# Patient Record
Sex: FEMALE | Race: WHITE
[De-identification: names, ages, dates, MRNs, and addresses within clinical notes are randomized per-mention and may not be internally consistent; named-entity substitution may affect disease eponyms.]

---

## 2017-06-05 NOTE — P.OP
Date of Procedure: 06/05/17


Preoperative Diagnosis: 


Cholecystitis


Postoperative Diagnosis: 


Cholecystitis





Cholelithiasis


Procedure(s) Performed: 


Laparoscopic cholecystectomy


Implants: 





Anesthesia: ZIGGY


Surgeon: Jose Santos


Estimated Blood Loss (ml): 5


Pathology: other (Gallbladder)


Condition: stable


Disposition: PACU


Indications for Procedure: 





Operative Findings: 





Description of Procedure: 


The patient was placed on the operating table.   The patient received a general 

endotracheal tube anesthesia.    The patients abdomen was prepped and draped 

in the usual sterile fashion.    Through an infraumbilical stab incision, the 

fascia of the anterior abdominal wall was grasped with a pair of Kochers and 

then the Veress needle was placed in the peritoneal cavity.   Position of the 

Veress needle was confirmed with positive drop test.   The abdomen was then 

insufflated.  After adequate insufflation, the 10 mm trocar was placed in the 

peritoneal cavity.    Following this the laparoscope was placed in the 

peritoneal cavity. The patient was placed in the head-up, right side up 

position and then a 5 mm trocar was placed in the right lateral and right 

subcostal position under direct visualization.    A 8 mm trocar was placed in 

the epigastric position.  The gallbladder was grasped in the fundus and 

infundibulum.  Traction  on the gallbladder was placed in the lateral and the 

cephalad positions.  The triangle of Calot  was visualized..   The cystic duct 

was bluntly dissected until the union of the cystic duct and common bile duct 

was seen.    The cystic duct was then divided and sealed with the Harmonic 

scissors.  A PDS Endoloop was then placed throughout the cystic duct stump.  

The cystic artery divided and sealed with the Harmonic scissors.   The 

gallbladder was then removed from the liver bed using Harmonic scissors.   The 

gallbladder was then extracted through the epigastric port site.  Operative 

field was checked for any bleeding spots and Harmonic scissors was used to 

coagulate the liver bed.    The abdomen was irrigated.   The trocars were 

removed.  The skin was closed using interrupted 3-0 Vicryl suture.   Dermabond 

dressing were applied.   The patient tolerated the procedure well.

## 2017-06-05 NOTE — P.GSHP
History of Present Illness


H&P Date: 17


Chief Complaint: Right upper quadrant pain





This is a 31-year-old female who presents today for laparoscopic 

cholecystectomy.  Patient presents with chronic complaints of right quadrant 

pain.  Her recent HIDA scan shows abnormal ejection fraction





- Constitutional


Constitutional: Reports as per HPI





Past Medical History


Past Medical History: No Reported History


History of Any Multi-Drug Resistant Organisms: None Reported


Past Surgical History:  Section


Past Anesthesia/Blood Transfusion Reactions: No Reported Reaction


Past Psychological History: Bipolar


Smoking Status: Never smoker


Past Alcohol Use History: None Reported


Past Drug Use History: None Reported





- Past Family History


  ** Mother


Family Medical History: No Reported History


Additional Family Medical History / Comment(s): mother / car accident





  ** Father


Family Medical History: No Reported History


Additional Family Medical History / Comment(s): / car accident





Medications and Allergies


 Home Medications











 Medication  Instructions  Recorded  Confirmed  Type


 


Dasetta 1 tab PO DAILY 17 History


 


Sertraline [Zoloft] 50 mg PO DAILY 17 History


 


lamoTRIgine [LaMICtal] 100 mg PO BID 17 History











 Allergies











Allergy/AdvReac Type Severity Reaction Status Date / Time


 


No Known Allergies Allergy   Verified 17 08:05














Surgical - Exam


 Vital Signs











Temp Pulse Resp BP Pulse Ox


 


 97.1 F L  79   16   122/60   98 


 


 17 08:16  17 08:16  17 08:16  17 08:16  17 08:16














- General


well developed





- Eyes


PERRL





- ENT


normal pinna





- Neck


no masses





- Respiratory


normal expansion





- Cardiovascular


Rhythm: regular





- Abdomen


Abdomen: soft, non tender





Assessment and Plan


Plan: 





Chronic cholecystitis.  We'll perform laparoscopic cholecystectomy.

## 2017-07-07 NOTE — P.OP
Date of Procedure: 07/07/17


Preoperative Diagnosis: 


Abdominal pain





Constipation


Postoperative Diagnosis: 


Normal colon


Procedure(s) Performed: 


Colonoscopy


Implants: 





Anesthesia: MAC


Surgeon: Jose Santos


Pathology: none sent


Condition: stable ([place])


Indications for Procedure: 





Operative Findings: 





Description of Procedure: 





PROCEDURE:  The patient was placed on the endoscopy table in the lateral 

position.  Digital rectal examination was performed which revealed no 

abnormalities.  .  Flexible colonoscope was then placed in the patient's anus 

and passed throughout the entire colon.  The ileocecal valve was visualized.  

The cecum, ascending, transverse, descending and sigmoid colon were normal.  

The rectum was normal as well.  There were no masses, polyps or diverticula 

noted in the entire colon. 





SUMMARY OF FINDINGS:  


Normal colonoscopy.

## 2017-07-07 NOTE — P.GSHP
History of Present Illness


H&P Date: 17


Chief Complaint: Constipation, abdominal pain





Asst. 31-year-old female for from Dr. gomez.  Patient was a today for 

colonoscopy.  She's had issues with constipation and lower quadrant abdominal 

pain.





Past Medical History


Past Medical History: No Reported History


History of Any Multi-Drug Resistant Organisms: None Reported


Past Surgical History:  Section, Cholecystectomy


Past Anesthesia/Blood Transfusion Reactions: No Reported Reaction


Past Psychological History: Bipolar


Smoking Status: Never smoker





- Past Family History


  ** Mother


Family Medical History: No Reported History


Additional Family Medical History / Comment(s): mother / car accident





  ** Father


Family Medical History: No Reported History


Additional Family Medical History / Comment(s): / car accident





Medications and Allergies


 Home Medications











 Medication  Instructions  Recorded  Confirmed  Type


 


Sertraline [Zoloft] 50 mg PO DAILY 17 History


 


lamoTRIgine [LaMICtal] 100 mg PO BID 17 History











 Allergies











Allergy/AdvReac Type Severity Reaction Status Date / Time


 


No Known Allergies Allergy   Verified 17 11:53














Surgical - Exam


 Vital Signs











Temp Pulse Resp BP Pulse Ox


 


 98 F   73   18   117/73   100 


 


 17 11:32  17 11:32  17 11:32  17 11:32  17 11:32














- General


well developed, no distress





- Eyes


PERRL





- ENT


normal pinna





- Neck


no masses





- Respiratory


normal expansion





- Cardiovascular


Rhythm: regular





- Abdomen


Abdomen: soft, non tender





Assessment and Plan


Plan: 





Constipation, change in bowel habits, abdominal pain.  We'll perform 

colonoscopy.

## 2017-08-12 NOTE — ED
General Adult HPI





- General


Chief complaint: Abdominal Pain


Stated complaint: stomach pain


Time Seen by Provider: 17 11:50


Source: patient, RN notes reviewed


Mode of arrival: ambulatory


Limitations: no limitations





- History of Present Illness


Initial comments: 





Patient 32-year-old female who presents emergency room today with chief 

complaint of abdominal pain located on the right side of the lower abdomen.  

She states the pain has been going on for "sometime".  She states she has seen 

multiple doctors for this in the past.  Most recently she was at [facility] just a few weeks ago had a CAT scan obtained.  She states she was told 

everything looked well.  She states still experiencing the pain that comes and 

goes.  Describes it as a sharp type pain at times.  States started once again 

last night.  Denies any other associated symptoms with it.  Denies any vaginal 

bleeding discharge.  Denies any other complaints. Patient denies any recent 

fever, chills, shortness of breath, chest pain, back pain, nausea or vomiting, 

numbness or tingling, dysuria or hematuria, constipation or diarrhea, headaches 

or visual changes, or any other complaints.





- Related Data


 Home Medications











 Medication  Instructions  Recorded  Confirmed


 


carBAMazepine [TEGretol] 200 mg PO Q12H 17











 Allergies











Allergy/AdvReac Type Severity Reaction Status Date / Time


 


No Known Allergies Allergy   Verified 17 12:19














Review of Systems


ROS Statement: 


Those systems with pertinent positive or pertinent negative responses have been 

documented in the HPI.





ROS Other: All systems not noted in ROS Statement are negative.





Past Medical History


Past Medical History: No Reported History


History of Any Multi-Drug Resistant Organisms: None Reported


Past Surgical History:  Section, Cholecystectomy


Past Anesthesia/Blood Transfusion Reactions: No Reported Reaction


Past Psychological History: Bipolar


Smoking Status: Never smoker


Past Alcohol Use History: None Reported


Past Drug Use History: None Reported





- Past Family History


  ** Mother


Family Medical History: No Reported History


Additional Family Medical History / Comment(s): mother / car accident





  ** Father


Family Medical History: No Reported History


Additional Family Medical History / Comment(s): / car accident





General Exam





- General Exam Comments


Initial Comments: 





General:  The patient is awake and alert, in no distress, and does not appear 

acutely ill. 


Eye:  Pupils are equal, round and reactive to light, extra-ocular movements are 

intact.  No nystagmus.  There is normal conjunctiva bilaterally.  No signs of 

icterus.  


Ears, nose, mouth and throat:  There are moist mucous membranes and no oral 

lesions. 


Neck:  The neck is supple, there is no tenderness or JVD.  


Cardiovascular:  There is a regular rate and rhythm. No murmur, rub or gallop 

is appreciated.


Respiratory:  Lungs are clear to auscultation, respirations are non-labored, 

breath sounds are equal.  No wheezes, stridor, rales, or rhonchi.


Gastrointestinal:  Normal appearance abdomen.  Normal bowel sounds.  Abdomen 

soft on palpation.  Patient does have tenderness right lower quadrant.  No 

rebound tenderness.  No guarding.  No CVA tenderness.


Musculoskeletal:  Normal ROM, no tenderness.  Strength 5/5. Sensation intact. 

Pulses equal bilaterally 2+.  


Neurological:  A&O x 3. CN II-XII intact, There are no obvious motor or sensory 

deficits. Coordination appears grossly intact. Speech is normal.


Skin:  Skin is warm and dry and no rashes or lesions are noted. 


Psychiatric:  Cooperative, appropriate mood & affect, normal judgment.  


Limitations: no limitations


External exam: Present: normal external exam (ER JOANA Zimmerman present for exam.)


Speculum exam: Present: normal speculum exam.  Absent: cervical discharge, 

vaginal bleeding, foreign body, tissue, laceration


By manual exam: Present: normal by manual exam.  Absent: cervical motion 

tenderness, adnexal tenderness, adnexal mass





Course





 Vital Signs











  17





  11:43 13:57


 


Temperature 97.1 F L 


 


Pulse Rate 89 83


 


Respiratory 18 18





Rate  


 


Blood Pressure 126/74 113/76


 


O2 Sat by Pulse 100 99





Oximetry  














Medical Decision Making





- Medical Decision Making





Patient's CAT scan from [facility] has been reviewed is performed 

on 2017 negative for any acute abnormalities.  A small bottle abdominal 

series is also performed on 2017 which was unremarkable.  At this time 

her abdomen is soft nontender.  Ultrasound does show possible follicle left 

ovary.  No sign of torsion.  Patient's labs reviewed unremarkable.  At time 

patient resting comfortably in the stretcher.  Cultures are pending in the lab.

  Patient will be discharged home advised follow-up with OB/GYN along with 

family physician.  Advised return for any other concerns.





- Lab Data


Result diagrams: 


 17 12:25





 17 12:25





 Lab Results











  17 Range/Units





  12:04 12:04 12:25 


 


WBC     (3.8-10.6)  k/uL


 


RBC     (3.80-5.40)  m/uL


 


Hgb     (11.4-16.0)  gm/dL


 


Hct     (34.0-46.0)  %


 


MCV     (80.0-100.0)  fL


 


MCH     (25.0-35.0)  pg


 


MCHC     (31.0-37.0)  g/dL


 


RDW     (11.5-15.5)  %


 


Plt Count     (150-450)  k/uL


 


Neutrophils %     %


 


Lymphocytes %     %


 


Monocytes %     %


 


Eosinophils %     %


 


Basophils %     %


 


Neutrophils #     (1.3-7.7)  k/uL


 


Lymphocytes #     (1.0-4.8)  k/uL


 


Monocytes #     (0-1.0)  k/uL


 


Eosinophils #     (0-0.7)  k/uL


 


Basophils #     (0-0.2)  k/uL


 


Sodium    142  (137-145)  mmol/L


 


Potassium    4.1  (3.5-5.1)  mmol/L


 


Chloride    105  ()  mmol/L


 


Carbon Dioxide    28  (22-30)  mmol/L


 


Anion Gap    9  mmol/L


 


BUN    11  (7-17)  mg/dL


 


Creatinine    0.70  (0.52-1.04)  mg/dL


 


Est GFR (MDRD) Af Amer    >60  (>60 ml/min/1.73 sqM)  


 


Est GFR (MDRD) Non-Af    >60  (>60 ml/min/1.73 sqM)  


 


Glucose    97  (74-99)  mg/dL


 


Calcium    9.0  (8.4-10.2)  mg/dL


 


Total Bilirubin    <0.1 L  (0.2-1.3)  mg/dL


 


AST    21  (14-36)  U/L


 


ALT    30  (9-52)  U/L


 


Alkaline Phosphatase    90  ()  U/L


 


Total Protein    6.6  (6.3-8.2)  g/dL


 


Albumin    3.8  (3.5-5.0)  g/dL


 


Amylase    57  ()  U/L


 


Lipase    56  ()  U/L


 


Urine Color   Light Yellow   


 


Urine Appearance   Clear   (Clear)  


 


Urine pH   6.0   (5.0-8.0)  


 


Ur Specific Gravity   1.005   (1.001-1.035)  


 


Urine Protein   Negative   (Negative)  


 


Urine Glucose (UA)   Negative   (Negative)  


 


Urine Ketones   Negative   (Negative)  


 


Urine Blood   Trace H   (Negative)  


 


Urine Nitrite   Negative   (Negative)  


 


Urine Bilirubin   Negative   (Negative)  


 


Urine Urobilinogen   <2.0   (<2.0)  mg/dL


 


Ur Leukocyte Esterase   Negative   (Negative)  


 


Urine RBC   1   (0-5)  /hpf


 


Ur Squamous Epith Cells   3   (0-4)  /hpf


 


Urine Bacteria   Rare H   (None)  /hpf


 


Urine HCG, Qual  Not Detected    (Not Detectd)  














  17 Range/Units





  12:25 


 


WBC  5.6  (3.8-10.6)  k/uL


 


RBC  4.07  (3.80-5.40)  m/uL


 


Hgb  12.5  (11.4-16.0)  gm/dL


 


Hct  39.2  (34.0-46.0)  %


 


MCV  96.2  (80.0-100.0)  fL


 


MCH  30.7  (25.0-35.0)  pg


 


MCHC  31.9  (31.0-37.0)  g/dL


 


RDW  13.0  (11.5-15.5)  %


 


Plt Count  431  (150-450)  k/uL


 


Neutrophils %  69  %


 


Lymphocytes %  19  %


 


Monocytes %  5  %


 


Eosinophils %  3  %


 


Basophils %  1  %


 


Neutrophils #  3.9  (1.3-7.7)  k/uL


 


Lymphocytes #  1.1  (1.0-4.8)  k/uL


 


Monocytes #  0.3  (0-1.0)  k/uL


 


Eosinophils #  0.2  (0-0.7)  k/uL


 


Basophils #  0.1  (0-0.2)  k/uL


 


Sodium   (137-145)  mmol/L


 


Potassium   (3.5-5.1)  mmol/L


 


Chloride   ()  mmol/L


 


Carbon Dioxide   (22-30)  mmol/L


 


Anion Gap   mmol/L


 


BUN   (7-17)  mg/dL


 


Creatinine   (0.52-1.04)  mg/dL


 


Est GFR (MDRD) Af Amer   (>60 ml/min/1.73 sqM)  


 


Est GFR (MDRD) Non-Af   (>60 ml/min/1.73 sqM)  


 


Glucose   (74-99)  mg/dL


 


Calcium   (8.4-10.2)  mg/dL


 


Total Bilirubin   (0.2-1.3)  mg/dL


 


AST   (14-36)  U/L


 


ALT   (9-52)  U/L


 


Alkaline Phosphatase   ()  U/L


 


Total Protein   (6.3-8.2)  g/dL


 


Albumin   (3.5-5.0)  g/dL


 


Amylase   ()  U/L


 


Lipase   ()  U/L


 


Urine Color   


 


Urine Appearance   (Clear)  


 


Urine pH   (5.0-8.0)  


 


Ur Specific Gravity   (1.001-1.035)  


 


Urine Protein   (Negative)  


 


Urine Glucose (UA)   (Negative)  


 


Urine Ketones   (Negative)  


 


Urine Blood   (Negative)  


 


Urine Nitrite   (Negative)  


 


Urine Bilirubin   (Negative)  


 


Urine Urobilinogen   (<2.0)  mg/dL


 


Ur Leukocyte Esterase   (Negative)  


 


Urine RBC   (0-5)  /hpf


 


Ur Squamous Epith Cells   (0-4)  /hpf


 


Urine Bacteria   (None)  /hpf


 


Urine HCG, Qual   (Not Detectd)  














Disposition


Clinical Impression: 


 Abdominal pain





Disposition: HOME SELF-CARE


Condition: Good


Instructions:  Abdominal Pain (ED)


Additional Instructions: 


Please use medication as discussed.  Please follow-up with OB/GYN/family doctor 

in the next 2 days of symptoms have not improved.  Please return to emergency 

room if the symptoms increase or worsen or for any other concerns.


Referrals: 


Marty Kim DO [Primary Care Provider] - 1-2 days


Time of Disposition: 14:16

## 2018-10-19 NOTE — US
EXAMINATION TYPE: US transvaginal plus Dopplers

 

DATE OF EXAM: 8/12/2017

 

COMPARISON: NONE

 

CLINICAL HISTORY: 32-year-old female pain. RLQ pain

 

TECHNIQUE:  Transvaginal (TV). Color Doppler and spectral waveform analysis of the ovarian arteries a
nd veins.

 

Date of LMP:  2 weeks ago

 

FINDINGS:

Uterus:  Anteverted but retroflexed measuring 9.4 x 3.7 x 5.4  cm. Tiny cervical nabothian cyst.

 

Endometrial Stripe: 0.9 cm

 

Right Ovary:  1.9 x 1.4 x 1.9 cm. Satisfactory arterial and venous flow is demonstrated.

 

Left Ovary:  3.2 x 3.1 x 3.0 cm for a volume of 15.9 mL. There is a 2.0 cm dominant follicle or funct
ional cyst within. Satisfactory arterial and venous flow is present.

 

No evident adnexal abnormality or cul-de-sac free fluid.

 

 

 

IMPRESSION: 

No sonographic evidence for ovarian torsion. There is a 2.0 cm dominant follicle or functional cyst o
f the left ovary. No pelvic free fluid.
Strong peripheral pulses/Capillary refill less/equal to 2 seconds

## 2020-02-10 ENCOUNTER — HOSPITAL ENCOUNTER (EMERGENCY)
Dept: HOSPITAL 47 - EC | Age: 35
Discharge: HOME | End: 2020-02-10
Payer: COMMERCIAL

## 2020-02-10 VITALS — DIASTOLIC BLOOD PRESSURE: 70 MMHG | SYSTOLIC BLOOD PRESSURE: 113 MMHG | HEART RATE: 69 BPM | TEMPERATURE: 97 F

## 2020-02-10 VITALS — RESPIRATION RATE: 18 BRPM

## 2020-02-10 DIAGNOSIS — M54.16: Primary | ICD-10-CM

## 2020-02-10 DIAGNOSIS — Z79.899: ICD-10-CM

## 2020-02-10 PROCEDURE — 99283 EMERGENCY DEPT VISIT LOW MDM: CPT

## 2020-02-10 PROCEDURE — 72100 X-RAY EXAM L-S SPINE 2/3 VWS: CPT

## 2020-02-10 PROCEDURE — 81025 URINE PREGNANCY TEST: CPT

## 2020-02-10 NOTE — XR
EXAMINATION TYPE: XR lumbar spine 2 or 3V

 

DATE OF EXAM: 2/10/2020

 

CLINICAL HISTORY: pain

 

TECHNIQUE: Three views of the lumbar spine are submitted.

 

COMPARISON: None.

 

FINDINGS:  

There are 5 lumbar type vertebral bodies identified.  The lumbar spine shows satisfactory alignment w
ithout evidence of acute fracture or dislocation. Vertebral body heights are within normal limits.   
Disc spaces are within normal limits.  The overlying soft tissue appears unremarkable.

 

IMPRESSION:  

No acute fracture or dislocation is seen in the lumbar spine.

 

ICD 10 NO FRACTURE, INITIAL EVALUATION

## 2020-02-10 NOTE — ED
Extremity Problem HPI





- General


Chief complaint: Extremity Problem,Nontraumatic


Stated complaint: Left hip pain


Time Seen by Provider: 02/10/20 20:06


Source: patient


Mode of arrival: ambulatory


Limitations: no limitations





- History of Present Illness


Initial comments: 


35yo female presenting for a left lower back pain x 2 days.  Patient states she 

occasionally gets left lower back pain that radiates down her left leg.  She 

states she does do some lifting at work.  Patient denies any new trauma or 

injury denies fevers denies IV drug use history of cancer chronic steroid use or

direct trauma or injury.  Patient denies any loss of bowel bladder control his 

urinary retention.  Patient denies any weakness or loss sensation of the 

extremity.  Patient states is occasional sharp shooting pain.  Patient states 

the pain is left or finding when it occurs. Patient denies abdominal pain, flank

pain, hematuria or urinary symptoms. Usnsure of pregnancy. remaining ROS (-).








- Related Data


                                Home Medications











 Medication  Instructions  Recorded  Confirmed


 


carBAMazepine [TEGretol] 200 mg PO Q12H 17











                                    Allergies











Allergy/AdvReac Type Severity Reaction Status Date / Time


 


No Known Allergies Allergy   Verified 02/10/20 19:38














Review of Systems


ROS Statement: 


Those systems with pertinent positive or pertinent negative responses have been 

documented in the HPI.





ROS Other: All systems not noted in ROS Statement are negative.





Past Medical History


Past Medical History: No Reported History


History of Any Multi-Drug Resistant Organisms: None Reported


Past Surgical History:  Section, Cholecystectomy


Past Anesthesia/Blood Transfusion Reactions: No Reported Reaction


Past Psychological History: Bipolar


Smoking Status: Never smoker


Past Alcohol Use History: None Reported


Past Drug Use History: None Reported





- Past Family History


  ** Mother


Family Medical History: No Reported History


Additional Family Medical History / Comment(s): mother / car accident





  ** Father


Family Medical History: No Reported History


Additional Family Medical History / Comment(s): / car accident





General Exam





- General Exam Comments


Initial Comments: 


General:  The patient is awake and alert, in no distress, and does not appear 

acutely ill. 


Eye:  Pupils are equal, round and reactive to light, extra-ocular movements are 

intact.  No nystagmus.  There is normal conjunctiva bilaterally.  No signs of 

icterus.  


Cardiovascular:  There is a regular rate and rhythm. No murmur, rub or gallop is

 appreciated.


Respiratory:  Lungs are clear to auscultation, respirations are non-labored, 

breath sounds are equal.  No wheezes, stridor, rales, or rhonchi.


Gastrointestinal:  Soft, non-distended, non-tender abdomen without masses or 

organomegaly noted. There is no rebound or guarding present.  No CVA tenderness


Musculoskeletal:  Normal inspection of thoracic and lumbar spine. No midline 

tenderness mild spinal tenderness on the right side.  Normal ROM, no tenderness 

of the LE b/l (-) SLR b/l. +2/5 DTR patellar/achilles.  Strength 5/5 of the LE 

b/l. Sensation intact of the LE b/l including the saddle region. Pulses equal 

bilaterally 2+.  


Neurological:  A&O x 3. CN II-XII intact grossly, There are no obvious motor or 

sensory deficits. Coordination appears grossly intact. Speech is normal.


Skin:  Skin is warm and dry and no rashes or lesions are noted. 


Psychiatric:  Cooperative, appropriate mood & affect, normal judgment.  





Limitations: no limitations





Course


                                   Vital Signs











  02/10/20 02/10/20





  19:34 22:17


 


Temperature 98.1 F 97.0 F L


 


Pulse Rate 75 69


 


Respiratory 18 18





Rate  


 


Blood Pressure 117/75 113/70


 


O2 Sat by Pulse 100 100





Oximetry  














Medical Decision Making





- Medical Decision Making


34-year-old female presenting today for chief complaint of low back pain 

radiating down left leg.  No injury or trauma.  No alarming features on history 

taking aside from appearing radicular.  Patient is no midline tenderness.  

Patient appears well nontoxic imaging studies revealed no acute process at this 

time feel she is stable for discharge with symptomatic treatment and PCP f/u at 

term parameters were discussed at length patient verbalized understanding and 

was discharged appearing well after discussing case with Dr. Hampton











- Lab Data


                                   Lab Results











  02/10/20 Range/Units





  20:40 


 


Urine HCG, Qual  Not Detected  (Not Detectd)  














Disposition


Clinical Impression: 


 Low back pain, Radiculopathy





Disposition: HOME SELF-CARE


Condition: Good


Instructions (If sedation given, give patient instructions):  Lumbar 

Radiculopathy (ED)


Additional Instructions: 


Please use medication as discussed.  Please follow-up with family doctor in the 

next 2 days.  Please return to emergency room if the symptoms increase or worsen

 or for any other concerns.


Is patient prescribed a controlled substance at d/c from ED?: No


Referrals: 


Marty Kim DO [Primary Care Provider] - 1-2 days


Time of Disposition: 21:45

## 2020-10-12 ENCOUNTER — HOSPITAL ENCOUNTER (OUTPATIENT)
Dept: HOSPITAL 47 - RADUSWWP | Age: 35
Discharge: HOME | End: 2020-10-12
Attending: OBSTETRICS & GYNECOLOGY
Payer: COMMERCIAL

## 2020-10-12 DIAGNOSIS — Z3A.11: ICD-10-CM

## 2020-10-12 PROCEDURE — 76817 TRANSVAGINAL US OBSTETRIC: CPT

## 2020-10-12 NOTE — US
EXAMINATION TYPE: Ultrasound OB transvaginal

 

DATE OF EXAM: 10/12/2020 3:19 PM

 

COMPARISON: NONE

 

CLINICAL HISTORY: 35-year-old female O76 Absent heart tones. 

 

EXAM PERFORMED:  TA and TV

 

FINDINGS:

 

EXAM MEASUREMENTS:

 

GESTATIONAL AGE / DATING

 

Physician Established: Not yet established

Dates by LMP: (11 weeks/2 days)  

** EDC: 2021

Dates by First Scan:  No previous here 

Dates by Current Scan for: ( 7   weeks/4 days) by gestational sac measure

 

MATERNAL ANATOMY 

 

Uterus: 10.3 x 6.0 x 5.6cm

Right Ovary: 2.5 x 2.7 x 1.7cm TA US

Left Ovary: 1.5 x 4.2 x 2.3cm TA US

Post CDS / Adnexa: wnl

Presence of free fluid: no

Presence of corpus luteal cyst: in right ovary = 1.8 x 1.0 x 1.5cm 

Presence of subchorionic bleed: no

 

GESTATION / FETAL SURVEY

 

MSD: 2.8cm (7 weeks/4 days)

Possible abnormal, somewhat solid appearing Yolk Sac (normal less than 6mm): 2.3mm TA US

Heart Rate: none detected in echogenic wall focus

IUP:  Single gestational sac with no fetal heart rate detected in echogenic upper gestational sac wal
l focus as assessed by M mode, PW Doppler, color flow or power color.

 

Date of LMP: unsure

Beta HcG (if available):   NA

 

 

 

 

 

IMPRESSION:

 

Discordant measurements (7 weeks 4 days) by MSD compared to GA by LMP (11 weeks 2 days). At the curre
nt mean sac diameter, a fetal pole should be identified. The 2.3 mm nodular focus could represent an 
abnormal yolk sac and shows no cardiac activity. Findings may represent a blighted ovum. Failed pregn
lo should be confirmed with serial beta hCG. Ultrasound follow-up can also be considered.

## 2020-10-19 ENCOUNTER — HOSPITAL ENCOUNTER (OUTPATIENT)
Dept: HOSPITAL 47 - LABWHC1 | Age: 35
Discharge: HOME | End: 2020-10-19
Attending: OBSTETRICS & GYNECOLOGY
Payer: COMMERCIAL

## 2020-10-19 DIAGNOSIS — Z01.818: Primary | ICD-10-CM

## 2020-10-19 DIAGNOSIS — O03.4: ICD-10-CM

## 2020-10-19 LAB
BASOPHILS # BLD AUTO: 0 K/UL (ref 0–0.2)
BASOPHILS NFR BLD AUTO: 0 %
EOSINOPHIL # BLD AUTO: 0.3 K/UL (ref 0–0.7)
EOSINOPHIL NFR BLD AUTO: 3 %
ERYTHROCYTE [DISTWIDTH] IN BLOOD BY AUTOMATED COUNT: 3.97 M/UL (ref 3.8–5.4)
ERYTHROCYTE [DISTWIDTH] IN BLOOD: 11.8 % (ref 11.5–15.5)
HCT VFR BLD AUTO: 39.4 % (ref 34–46)
HGB BLD-MCNC: 12.3 GM/DL (ref 11.4–16)
LYMPHOCYTES # SPEC AUTO: 1.5 K/UL (ref 1–4.8)
LYMPHOCYTES NFR SPEC AUTO: 13 %
MCH RBC QN AUTO: 30.9 PG (ref 25–35)
MCHC RBC AUTO-ENTMCNC: 31.1 G/DL (ref 31–37)
MCV RBC AUTO: 99.3 FL (ref 80–100)
MONOCYTES # BLD AUTO: 0.4 K/UL (ref 0–1)
MONOCYTES NFR BLD AUTO: 3 %
NEUTROPHILS # BLD AUTO: 9.3 K/UL (ref 1.3–7.7)
NEUTROPHILS NFR BLD AUTO: 80 %
PLATELET # BLD AUTO: 437 K/UL (ref 150–450)
WBC # BLD AUTO: 11.7 K/UL (ref 3.8–10.6)

## 2020-10-19 PROCEDURE — 85025 COMPLETE CBC W/AUTO DIFF WBC: CPT

## 2020-10-19 PROCEDURE — 86850 RBC ANTIBODY SCREEN: CPT

## 2020-10-19 PROCEDURE — 86901 BLOOD TYPING SEROLOGIC RH(D): CPT

## 2020-10-19 PROCEDURE — 86900 BLOOD TYPING SEROLOGIC ABO: CPT

## 2020-10-20 ENCOUNTER — HOSPITAL ENCOUNTER (OUTPATIENT)
Dept: HOSPITAL 47 - OR | Age: 35
Discharge: HOME | End: 2020-10-20
Attending: OBSTETRICS & GYNECOLOGY
Payer: COMMERCIAL

## 2020-10-20 VITALS — SYSTOLIC BLOOD PRESSURE: 100 MMHG | DIASTOLIC BLOOD PRESSURE: 66 MMHG | RESPIRATION RATE: 18 BRPM

## 2020-10-20 VITALS — HEART RATE: 87 BPM

## 2020-10-20 VITALS — TEMPERATURE: 98.6 F

## 2020-10-20 DIAGNOSIS — O03.4: Primary | ICD-10-CM

## 2020-10-20 DIAGNOSIS — F32.9: ICD-10-CM

## 2020-10-20 DIAGNOSIS — Z79.899: ICD-10-CM

## 2020-10-20 DIAGNOSIS — Z90.49: ICD-10-CM

## 2020-10-20 DIAGNOSIS — Z98.891: ICD-10-CM

## 2020-10-20 DIAGNOSIS — Z98.890: ICD-10-CM

## 2020-10-20 DIAGNOSIS — F41.9: ICD-10-CM

## 2020-10-20 DIAGNOSIS — Z83.3: ICD-10-CM

## 2020-10-20 PROCEDURE — 86901 BLOOD TYPING SEROLOGIC RH(D): CPT

## 2020-10-20 PROCEDURE — 59812 TREATMENT OF MISCARRIAGE: CPT

## 2020-10-20 PROCEDURE — 86850 RBC ANTIBODY SCREEN: CPT

## 2020-10-20 PROCEDURE — 86900 BLOOD TYPING SEROLOGIC ABO: CPT

## 2020-10-20 PROCEDURE — 88305 TISSUE EXAM BY PATHOLOGIST: CPT

## 2020-10-20 NOTE — P.OP
Date of Procedure: 10/20/20


Preoperative Diagnosis: 


7+ weeks incomplete 


Postoperative Diagnosis: 


Same


Procedure(s) Performed: 


#1.  Dilation and aspiration curettage


Anesthesia: other (Gen. by face mask)


Surgeon: Jasvir Gan


Estimated Blood Loss (ml): 100


IV fluids (ml): 200


Urine output (ml): 10


Pathology: other (Intrauterine contents)


Condition: stable


Disposition: PACU


Operative Findings: 


Preoperative pelvic examination demonstrated a 7-8 week anteverted mobile normal

shaped uterus with normal adnexa bilaterally.  Intraoperatively, the uterus 

sounded to approximately 11 cm.  Tissue is clearly seen passing through the 

tubing on the first pass.  The typical gritty texture was encountered with sharp

curettage and no further tissue was seen.


Description of Procedure: 


The patient was prepped and draped in usual fashion after general anesthesia was

admission by the anesthesiologist.  A weighted speculum was placed and the 

bladder drained of approximately 10 mL of clear maikel urine.  The anterior lip 

of the cervix was grasped with a single-tooth tenaculum and uterus sounded to 11

cm as noted above.  Serial dilation was carried out to admit a #8 curved 

aspiration curet which was placed in the fundus and suction applied.  After 

adequate suction had been built, thorough and circumferential curettage was 

carried out from the fundus to the cervix with tissue clearly seen passing 

through the tubing.  A second pass was made at which time no further tissue was 

noted.  The aspiration curet was treated for a small sharp curette which was 

utilized to again thoroughly and circumferentially curet the in vitro cavity 

with no tissue being noted and the typical gritty texture being noted 

throughout.  One last pass was made with the aspiration curet at which time no 

further tissue was noted.  All instrumentation was then removed.  One point of 

bleeding from the tenaculum site was made hemostatic with pressure.  Assessment 

a blood loss for the case is approximately 100 mL.  There were no complications.

 All sponge, instrument, and needle counts were correct.  The patient tolerated 

the procedure well and proceeded to the recovery room in stable condition.

## 2021-07-26 ENCOUNTER — HOSPITAL ENCOUNTER (OUTPATIENT)
Dept: HOSPITAL 47 - RADMAMWWP | Age: 36
Discharge: HOME | End: 2021-07-26
Attending: FAMILY MEDICINE
Payer: COMMERCIAL

## 2021-07-26 DIAGNOSIS — Z79.3: ICD-10-CM

## 2021-07-26 DIAGNOSIS — Z12.31: Primary | ICD-10-CM

## 2021-07-26 PROCEDURE — 77067 SCR MAMMO BI INCL CAD: CPT

## 2021-07-26 PROCEDURE — 77063 BREAST TOMOSYNTHESIS BI: CPT

## 2021-07-29 NOTE — MM
Reason for exam: screening  (asymptomatic).

Baseline mammogram.



History:

Took hormonal contraceptives beginning at age 27.



Physical Findings:

Nurse did not find any significant physical abnormalities on exam.



MG 3D Screening Mammo W/Cad

Bilateral CC, MLO, and XCCL view(s) were taken.

The breast tissue is extremely dense which could obscure a lesion on mammography. 

There is no discrete abnormality.





ASSESSMENT: Negative, BI-RAD 1



RECOMMENDATION:

Routine screening mammogram of both breasts in 1 year.

## 2021-09-27 ENCOUNTER — HOSPITAL ENCOUNTER (OUTPATIENT)
Dept: HOSPITAL 47 - RADXRMAIN | Age: 36
Discharge: HOME | End: 2021-09-27
Attending: NURSE PRACTITIONER
Payer: COMMERCIAL

## 2021-09-27 DIAGNOSIS — M54.5: ICD-10-CM

## 2021-09-27 DIAGNOSIS — M13.0: Primary | ICD-10-CM

## 2021-09-27 PROCEDURE — 72110 X-RAY EXAM L-2 SPINE 4/>VWS: CPT

## 2021-09-27 PROCEDURE — 72170 X-RAY EXAM OF PELVIS: CPT

## 2021-09-27 NOTE — XR
EXAMINATION TYPE: XR pelvis AP view

 

DATE OF EXAM: 9/27/2021

 

COMPARISON: None

 

HISTORY: Polyarthritis

 

TECHNIQUE: AP pelvis

 

FINDINGS: Sacroiliac joints are patent. Symphysis pubis is normal. Femoral heads articulate with the 
acetabulum. Joint spaces are preserved. No suspicious bowel gas pattern or calcifications are evident
.

 

IMPRESSION:

1.  Normal AP pelvis

## 2021-09-27 NOTE — XR
EXAMINATION TYPE: XR lumbosacral spine min 4V

 

DATE OF EXAM: 9/27/2021

 

COMPARISON: 2/10/2020

 

HISTORY: Polyarthritis low back pain

 

TECHNIQUE: 5 view lumbar spine

 

FINDINGS: There are 5 lumbar-type vertebral bodies.. Pedicles are intact. Disc heights are preserved.
 Vertebral body heights are preserved. Facets are unremarkable.

 

IMPRESSION:

1.  Normal 5 view lumbar spine

## 2022-08-03 ENCOUNTER — HOSPITAL ENCOUNTER (INPATIENT)
Dept: HOSPITAL 47 - FBPOP | Age: 37
LOS: 3 days | Discharge: HOME | End: 2022-08-06
Attending: OBSTETRICS & GYNECOLOGY | Admitting: OBSTETRICS & GYNECOLOGY
Payer: COMMERCIAL

## 2022-08-03 DIAGNOSIS — O40.3XX0: ICD-10-CM

## 2022-08-03 DIAGNOSIS — Z30.2: ICD-10-CM

## 2022-08-03 DIAGNOSIS — O34.211: Primary | ICD-10-CM

## 2022-08-03 DIAGNOSIS — Z79.82: ICD-10-CM

## 2022-08-03 DIAGNOSIS — F31.9: ICD-10-CM

## 2022-08-03 DIAGNOSIS — Z79.899: ICD-10-CM

## 2022-08-03 DIAGNOSIS — O36.63X0: ICD-10-CM

## 2022-08-03 DIAGNOSIS — Z3A.38: ICD-10-CM

## 2022-08-03 LAB
ALT SERPL-CCNC: 23 U/L (ref 4–34)
APTT BLD: 23.4 SEC (ref 22–30)
AST SERPL-CCNC: 49 U/L (ref 14–36)
BASOPHILS # BLD AUTO: 0.1 K/UL (ref 0–0.2)
BASOPHILS NFR BLD AUTO: 0 %
BUN SERPL-SCNC: 10 MG/DL (ref 7–17)
EOSINOPHIL # BLD AUTO: 0.1 K/UL (ref 0–0.7)
EOSINOPHIL NFR BLD AUTO: 1 %
ERYTHROCYTE [DISTWIDTH] IN BLOOD BY AUTOMATED COUNT: 4.08 M/UL (ref 3.8–5.4)
ERYTHROCYTE [DISTWIDTH] IN BLOOD: 13 % (ref 11.5–15.5)
HCT VFR BLD AUTO: 40.9 % (ref 34–46)
HGB BLD-MCNC: 13.3 GM/DL (ref 11.4–16)
INR PPP: 0.8 (ref ?–1.2)
LDH SPEC-CCNC: 828 U/L (ref 313–618)
LYMPHOCYTES # SPEC AUTO: 1.2 K/UL (ref 1–4.8)
LYMPHOCYTES NFR SPEC AUTO: 9 %
MCH RBC QN AUTO: 32.5 PG (ref 25–35)
MCHC RBC AUTO-ENTMCNC: 32.5 G/DL (ref 31–37)
MCV RBC AUTO: 100.2 FL (ref 80–100)
MONOCYTES # BLD AUTO: 0.4 K/UL (ref 0–1)
MONOCYTES NFR BLD AUTO: 3 %
NEUTROPHILS # BLD AUTO: 10.7 K/UL (ref 1.3–7.7)
NEUTROPHILS NFR BLD AUTO: 85 %
PH UR: 6 [PH] (ref 5–8)
PLATELET # BLD AUTO: 431 K/UL (ref 150–450)
PROT UR QL: (no result)
PROT/CREAT UR-RTO: 0.62
PT BLD: 9.3 SEC (ref 9–12)
RBC UR QL: 3 /HPF (ref 0–5)
SP GR UR: 1.01 (ref 1–1.03)
SQUAMOUS UR QL AUTO: <1 /HPF (ref 0–4)
URATE SERPL-MCNC: 7.1 MG/DL (ref 3.7–7.4)
UROBILINOGEN UR QL STRIP: <2 MG/DL (ref ?–2)
WBC # BLD AUTO: 12.6 K/UL (ref 3.8–10.6)
WBC #/AREA URNS HPF: 1 /HPF (ref 0–5)

## 2022-08-03 PROCEDURE — 0UL70CZ OCCLUSION OF BILATERAL FALLOPIAN TUBES WITH EXTRALUMINAL DEVICE, OPEN APPROACH: ICD-10-PCS

## 2022-08-03 PROCEDURE — 84520 ASSAY OF UREA NITROGEN: CPT

## 2022-08-03 PROCEDURE — 84450 TRANSFERASE (AST) (SGOT): CPT

## 2022-08-03 PROCEDURE — 84156 ASSAY OF PROTEIN URINE: CPT

## 2022-08-03 PROCEDURE — 85610 PROTHROMBIN TIME: CPT

## 2022-08-03 PROCEDURE — 86850 RBC ANTIBODY SCREEN: CPT

## 2022-08-03 PROCEDURE — 85025 COMPLETE CBC W/AUTO DIFF WBC: CPT

## 2022-08-03 PROCEDURE — 84460 ALANINE AMINO (ALT) (SGPT): CPT

## 2022-08-03 PROCEDURE — 81001 URINALYSIS AUTO W/SCOPE: CPT

## 2022-08-03 PROCEDURE — 82570 ASSAY OF URINE CREATININE: CPT

## 2022-08-03 PROCEDURE — 83615 LACTATE (LD) (LDH) ENZYME: CPT

## 2022-08-03 PROCEDURE — 82565 ASSAY OF CREATININE: CPT

## 2022-08-03 PROCEDURE — 85730 THROMBOPLASTIN TIME PARTIAL: CPT

## 2022-08-03 PROCEDURE — 59025 FETAL NON-STRESS TEST: CPT

## 2022-08-03 PROCEDURE — 99213 OFFICE O/P EST LOW 20 MIN: CPT

## 2022-08-03 PROCEDURE — 88307 TISSUE EXAM BY PATHOLOGIST: CPT

## 2022-08-03 PROCEDURE — 84550 ASSAY OF BLOOD/URIC ACID: CPT

## 2022-08-03 PROCEDURE — 86901 BLOOD TYPING SEROLOGIC RH(D): CPT

## 2022-08-03 PROCEDURE — 86900 BLOOD TYPING SEROLOGIC ABO: CPT

## 2022-08-03 RX ADMIN — DOCUSATE SODIUM AND SENNOSIDES SCH: 50; 8.6 TABLET ORAL at 21:15

## 2022-08-03 RX ADMIN — POTASSIUM CHLORIDE SCH MLS/HR: 14.9 INJECTION, SOLUTION INTRAVENOUS at 15:52

## 2022-08-03 RX ADMIN — POTASSIUM CHLORIDE SCH MLS/HR: 14.9 INJECTION, SOLUTION INTRAVENOUS at 21:19

## 2022-08-03 RX ADMIN — ACETAMINOPHEN SCH MG: 500 TABLET ORAL at 15:52

## 2022-08-03 RX ADMIN — IBUPROFEN SCH: 600 TABLET ORAL at 21:14

## 2022-08-03 NOTE — P.OP
Date of Procedure: 22


Preoperative Diagnosis: 


Intrauterine pregnancy at 38-2/7 weeks


Polyhydramnios


Large for gestational age


Advanced maternal age


History of previous low transverse  section 2


Desires permanent sterility


Postoperative Diagnosis: 


Same


Procedure(s) Performed: 


Repeat low transverse  section with bilateral tubal ligation with 

Filshie clips


Anesthesia: spinal


Surgeon: Janice Herrera

## 2022-08-03 NOTE — P.HPOB
History of Present Illness


H&P Date: 22


Chief Complaint: Labor at 38-2/7 weeks





This is a 37-year-old  4 para  woman with an estimated due date of 

08/15/2022 based on LMP consistent with 9 week ultrasound.  She presents in 

spontaneous active labor with 12+ hours of painful uterine contractions.  She 

denies vaginal bleeding or leakage of fluids.  Pregnancy has been complicated by

significant polyhydramnios and suspected large for gestational age on 

ultrasound.  She has a history of 2 previous low transverse  section and

has been scheduled for repeat  with tubal ligation.  She's been on 

counseled on risks benefits and alternatives to the tubal ligation for 

contraception and consents have been signed.





Upon initial evaluation in labor and delivery triage she is found to be regular

ly contraction every 2-3 minutes and her cervix is 1 cm dilated and 70% effaced.

 Patient is very uncomfortable.  Plan is to proceed with repeat  at 

this time.





Blood pressures were elevated upon initial presentation in the 150s over 80s.  

Laboratory data done preoperatively shows normal platelets however slightly 

elevated AST ALTs and LDH.





Review of Systems


All systems: negative





Past Medical History


Past Medical History: No Reported History


History of Any Multi-Drug Resistant Organisms: None Reported


Past Surgical History:  Section, Cholecystectomy


Past Anesthesia/Blood Transfusion Reactions: No Reported Reaction


Past Psychological History: Bipolar


Smoking Status: Never smoker


Past Alcohol Use History: None Reported


Past Drug Use History: None Reported





- Past Family History


  ** Mother


Family Medical History: No Reported History


Additional Family Medical History / Comment(s): mother / car accident





  ** Father


Family Medical History: No Reported History, Diabetes Mellitus


Additional Family Medical History / Comment(s): / car accident





Medications and Allergies


                                Home Medications











 Medication  Instructions  Recorded  Confirmed  Type


 


carBAMazepine [TEGretol] 200 mg PO Q12H 17 History


 


Aspirin 1 tab PO DAILY 22 History


 


FLUoxetine HCL [PROzac] 20 mg PO DAILY 22 History


 


Prenatal Vit No.179/Iron/Folic 1 tab PO DAILY 22 History





[Prenatal Tablet]    


 


lamoTRIgine [LaMICtal] 150 mg PO BID 22 History








                                    Allergies











Allergy/AdvReac Type Severity Reaction Status Date / Time


 


No Known Allergies Allergy   Verified 22 09:49














Exam


                                   Vital Signs











  Temp Pulse Resp BP


 


 22 11:15  98.0 F  86  16  146/85


 


 22 10:43  98.0 F  86  16  146/85








                                Intake and Output











 22





 22:59 06:59 14:59


 


Other:   


 


  Weight   71.214 kg














Targeted physical exam is performed: This is a visibly gravid and very 

uncomfortable-appearing  female.  On the fundal height is greater than 

stated gestational age significantly consistent with history of polyhydramnios. 

 Cervix 1 cm, 70% effaced and the vertex in the -1 station, very low.  Fetal 

heart tones are category 1 and she is marcus regularly.





Results


Result Diagrams: 


                                 22 11:13





                                 22 11:13


                  Abnormal Lab Results - Last 24 Hours (Table)











  22 Range/Units





  11:13 11:13 


 


WBC  12.6 H   (3.8-10.6)  k/uL


 


MCV  100.2 H   (80.0-100.0)  fL


 


Neutrophils #  10.7 H   (1.3-7.7)  k/uL


 


AST   49 H  (14-36)  U/L


 


Lactate Dehydrogenase   828 H  (313-618)  U/L














Assessment and Plan


(1) Advanced maternal age (AMA) in pregnancy


Current Visit: Yes   Status: Acute   Code(s): ZGJ9594 -    SNOMED Code(s): 

545369350


   





(2) Family planning


Current Visit: Yes   Status: Acute   Code(s): Z30.09 - ENCOUNTER FOR OT GENERAL

 CNSL AND ADVICE ON CONTRACEPTION   SNOMED Code(s): 420940225


   





(3) History of 


Current Visit: Yes   Status: Acute   Code(s): Z98.891 - HISTORY OF UTERINE SCAR 

FROM PREVIOUS SURGERY   SNOMED Code(s): 448037558


   





(4) Spontaneous onset of labor


Current Visit: Yes   Status: Acute   Code(s): JKL7905 -    SNOMED Code(s): 

24869769


   





(5) Polyhydramnios


Current Visit: Yes   Status: Acute   Code(s): O40.9XX0 - POLYHYDRAMNIOS, UNSP 

TRIMESTER, NOT APPLICABLE OR UNSP   SNOMED Code(s): 03082729


   


Plan: 





37-year-old  4 para 2012 woman presenting in spontaneous active labor at 

38-2/7 weeks' gestation.  History of previous low transverse  section 

2, planning for a repeat  with bilateral tubal ligation.  Risks of the

 procedure include bleeding, transfusion, infection, injury to bowel, bladder, 

ureters and/or other pelvic structure and/or the infant.  Patient's been 

counseled regarding these risks in the office previously and these are reviewed 

again today.  She again verbalizes desire for bilateral tubal ligation.





Blood pressures on mildly elevated.  She has a elevated LDH and AST.  Remainder 

of her labs are normal.  She is asymptomatic.  Will monitor postpartum floor 

need for further evaluation or treatment.


Time with Patient: Greater than 30

## 2022-08-03 NOTE — P.OP
Date of Procedure: 22


Preoperative Diagnosis: 


Intrauterine pregnancy at 38-2/7 weeks' gestation


Spontaneous active labor


Polyhydramnios


Large for gestational age


History of previous low transverse  section 2


Advanced maternal age


Desires permanent sterility


Postoperative Diagnosis: 


Same


Procedure(s) Performed: 


Repeat low transverse  section with bilateral tubal ligation with 

Filshie clips


Anesthesia: spinal


Surgeon: Janice Herrera


Assistant #1: Mariama Wright


Estimated Blood Loss (ml): 530


IV fluids (ml): 1,000


Urine output (ml): 100


Pathology: other (Placenta)


Condition: stable


Disposition: floor


Indications for Procedure: 


Circumflex previous low transverse  section 2, in active labor


Operative Findings: 


Male infant in the vertex occiput anterior position with nuchal cord 1.  Apgars

of 7 at 1 minute and 9 at 5 minutes weighing 8 pounds, 13 ounces, 4000 g.  

Mildly meconium-stained placenta.  Normal-appearing bilateral fallopian tubes 

and ovaries.  Extensive scarring of the bladder to the anterior uterine wall.


Description of Procedure: 


After the patient was met preoperatively and all questions were answered, she 

was taken to the operating room where spinal anesthetic was administered without

incident.  She was then positioned, prepped and draped in the dorsal supine 

position with a leftward tilt.  Salter catheter was placed.  After anesthetic was

confirmed adequate, a low transverse skin incision was made following the pre-

existing scar.  This was carried down to the underlying fascia both sharply and 

with the electrocautery.  The fascia was then incised in the midline and 

extended bilaterally with the Mendez scissors.  The superior aspect of the fascial

incision was elevated and the underlying rectus muscles dissected off sharply 

and with the electrocautery.  The inferior aspect of the fascial incision was 

also elevated and the underlying rectus muscles dissected off sharply.  The 

muscles were adherent in the midline.  The peritoneum was noted to be entered 

during this dissection.  The bladder was noted to be densely and irregularly 

adherent to the midportion of the uterus.  This was very carefully dissected aw

ay using Metzenbaum scissors with excellent visualization of the bladder 

throughout.  The bladder blade was placed.    A low transverse uterine incision 

was then made sharply and carried down to the underlying amniotic membranes.  

Membranes were ruptured and copious clear fluid was noted.  The uterine incision

was extended bilaterally bluntly.  The infant's head was delivered from the 

incision without difficulty.  Nuchal cord 1 was reduced The nose and mouth were

bulb suctioned.  The rest of the infant was delivered onto the field without 

difficulty.  And cut and the infant was taken to the warmer.  An intact, three-

vessel cord placenta was then manually removed and the uterus was exteriorized. 

The uterus was cleared of all clot and debris.  The uterine incision was 

delineated with Case clamps.  The uterine incision was then closed in a 

running locked fashion with 0 Vicryl suture.  Additional figure-of-eight sutures

were placed where necessary along the incision for hemostasis.  The right and 

left fallopian tubes were positively identified and carried out to the 

fimbriated ends.  Filshie clip clips were then applied in the mid ampullary 

portion of the tubes completely transecting each tube.  The uterus was then 

returned to the abdomen and the gutters were cleared of all clot and debris.  

The uterine incision was reinspected and Bovie electrocautery was utilized were 

necessary for hemostasis.  Surgicel powder was placed over the entire low 

uterine segment and bladder for hemostasis.  Urine was noted to be clear at this

time.  The fascial edges, peritoneal edges and rectus muscles were inspected and

Bovie electrocautery utilized were necessary for hemostasis.  The fascia was 

then closed in a running fashion with 0 Vicryl suture.  The subcuticular tissue 

was copiously suction irrigated and Bovie electrocautery utilized were necessary

for hemostasis.  3-0 Vicryl suture was utilized to reapproximate the 

subcuticular tissue.  The skin was then closed in a subcutaneous fashion with 4-

0 Vicryl suture.  All counts reported to me as correct by the operating room 

staff at the end of the procedure.  The patient received antibiotics 

preoperatively and Pitocin following cord clamp.  Mother and infant were both 

transported from the room in stable condition.

## 2022-08-04 LAB
ALT SERPL-CCNC: 21 U/L (ref 4–34)
AST SERPL-CCNC: 50 U/L (ref 14–36)
BASOPHILS # BLD AUTO: 0 K/UL (ref 0–0.2)
BASOPHILS NFR BLD AUTO: 0 %
EOSINOPHIL # BLD AUTO: 0.1 K/UL (ref 0–0.7)
EOSINOPHIL NFR BLD AUTO: 1 %
ERYTHROCYTE [DISTWIDTH] IN BLOOD BY AUTOMATED COUNT: 3 M/UL (ref 3.8–5.4)
ERYTHROCYTE [DISTWIDTH] IN BLOOD: 12.7 % (ref 11.5–15.5)
HCT VFR BLD AUTO: 29.6 % (ref 34–46)
HGB BLD-MCNC: 9.5 GM/DL (ref 11.4–16)
LDH SPEC-CCNC: 804 U/L (ref 313–618)
LYMPHOCYTES # SPEC AUTO: 1.4 K/UL (ref 1–4.8)
LYMPHOCYTES NFR SPEC AUTO: 12 %
MCH RBC QN AUTO: 31.8 PG (ref 25–35)
MCHC RBC AUTO-ENTMCNC: 32.2 G/DL (ref 31–37)
MCV RBC AUTO: 98.7 FL (ref 80–100)
MONOCYTES # BLD AUTO: 0.4 K/UL (ref 0–1)
MONOCYTES NFR BLD AUTO: 3 %
NEUTROPHILS # BLD AUTO: 9.5 K/UL (ref 1.3–7.7)
NEUTROPHILS NFR BLD AUTO: 82 %
PLATELET # BLD AUTO: 385 K/UL (ref 150–450)
WBC # BLD AUTO: 11.6 K/UL (ref 3.8–10.6)

## 2022-08-04 RX ADMIN — POTASSIUM CHLORIDE SCH: 14.9 INJECTION, SOLUTION INTRAVENOUS at 14:58

## 2022-08-04 RX ADMIN — IBUPROFEN SCH MG: 600 TABLET ORAL at 04:53

## 2022-08-04 RX ADMIN — ACETAMINOPHEN SCH MG: 500 TABLET ORAL at 23:34

## 2022-08-04 RX ADMIN — POTASSIUM CHLORIDE SCH: 14.9 INJECTION, SOLUTION INTRAVENOUS at 06:53

## 2022-08-04 RX ADMIN — DOCUSATE SODIUM AND SENNOSIDES SCH: 50; 8.6 TABLET ORAL at 20:02

## 2022-08-04 RX ADMIN — ACETAMINOPHEN SCH: 500 TABLET ORAL at 00:35

## 2022-08-04 RX ADMIN — DOCUSATE SODIUM AND SENNOSIDES SCH: 50; 8.6 TABLET ORAL at 14:52

## 2022-08-04 RX ADMIN — IBUPROFEN SCH: 600 TABLET ORAL at 04:53

## 2022-08-04 RX ADMIN — IBUPROFEN SCH: 600 TABLET ORAL at 15:01

## 2022-08-04 RX ADMIN — ACETAMINOPHEN SCH: 500 TABLET ORAL at 17:18

## 2022-08-04 RX ADMIN — ACETAMINOPHEN SCH MG: 500 TABLET ORAL at 14:50

## 2022-08-04 RX ADMIN — IBUPROFEN SCH MG: 600 TABLET ORAL at 18:04

## 2022-08-04 RX ADMIN — ACETAMINOPHEN SCH: 500 TABLET ORAL at 15:02

## 2022-08-04 NOTE — P.PNOBGPC
Subjective





- Subjective


Principal diagnosis: Postop day 1, repeat  section with tubal ligation


Interval history: 





Patient is doing well postoperatively.  She is ambulating and voiding without 

difficulty.  She states her pain is well-controlled.  She is tolerating a 

regular diet without nausea or vomiting.  States her lochia is minimal.  She is 

bottle feeding.


Patient reports: Reports appetite normal, Reports voiding normally, Reports pain

well controlled, Reports ambulating normally


[place]: doing well, bottle feeding





Objective





- Vital Signs


Latest vital signs: 


                                   Vital Signs











  Temp Pulse Resp BP Pulse Ox


 


 22 04:00  98.7 F  100  16  126/77  95


 


 22 00:00  98.3 F  78  16  133/84  98


 


 22 20:00  97.9 F  82  16  122/76  98


 


 22 15:05  97.2 F L  71  16  137/65  96


 


 22 14:35   81  16  138/74  93 L


 


 22 14:05   81  16  138/63  96


 


 22 13:50   76  16  123/61  96


 


 22 13:35   81  16  121/60  96


 


 22 13:20   76  16  122/61  94 L


 


 22 13:05  96.5 F L  80  16  123/58  94 L


 


 22 11:15  98.0 F  86  16  146/85 


 


 22 10:43  98.0 F  86  16  146/85 








                                Intake and Output











 22





 22:59 06:59 14:59


 


Output Total 250 1100 


 


Balance -250 -1100 


 


Output:   


 


  Urine 200 1100 


 


    Uretheral (Salter) 200 600 


 


  Output, Quantitative 50  





  Blood Loss   














- Exam


Extremities: Present: normal.  Absent: edema


Abdomen: Present: normal appearance


Incision: Present: normal, dry, intact


Uterus: Present: normal, firm





- Labs


Labs: 


                  Abnormal Lab Results - Last 24 Hours (Table)











  22 Range/Units





  11:13 11:13 12:19 


 


WBC  12.6 H    (3.8-10.6)  k/uL


 


RBC     (3.80-5.40)  m/uL


 


Hgb     (11.4-16.0)  gm/dL


 


Hct     (34.0-46.0)  %


 


MCV  100.2 H    (80.0-100.0)  fL


 


Neutrophils #  10.7 H    (1.3-7.7)  k/uL


 


AST   49 H   (14-36)  U/L


 


Lactate Dehydrogenase   828 H   (313-618)  U/L


 


Urine Protein    1+ H  (Negative)  


 


Urine Mucus    Rare H  (None)  /hpf














  22 Range/Units





  06:34 06:34 


 


WBC  11.6 H   (3.8-10.6)  k/uL


 


RBC  3.00 L   (3.80-5.40)  m/uL


 


Hgb  9.5 L D   (11.4-16.0)  gm/dL


 


Hct  29.6 L   (34.0-46.0)  %


 


MCV    (80.0-100.0)  fL


 


Neutrophils #  9.5 H   (1.3-7.7)  k/uL


 


AST   50 H  (14-36)  U/L


 


Lactate Dehydrogenase   804 H  (313-618)  U/L


 


Urine Protein    (Negative)  


 


Urine Mucus    (None)  /hpf














Assessment and Plan


(1) S/P  section


Current Visit: Yes   Status: Acute   Code(s): Z98.891 - HISTORY OF UTERINE SCAR 

FROM PREVIOUS SURGERY   SNOMED Code(s): 805608211


   





(2) Advanced maternal age (AMA) in pregnancy


Current Visit: Yes   Status: Acute   Code(s): YUK2776 -    SNOMED Code(s): 

266218824


   





(3) Family planning


Current Visit: Yes   Status: Acute   Code(s): Z30.09 - ENCOUNTER FOR OT GENERAL

CNSL AND ADVICE ON CONTRACEPTION   SNOMED Code(s): 972115272


   





(4) History of 


Current Visit: Yes   Status: Acute   Code(s): Z98.891 - HISTORY OF UTERINE SCAR 

FROM PREVIOUS SURGERY   SNOMED Code(s): 271420867


   





(5) Polyhydramnios


Current Visit: Yes   Status: Acute   Code(s): O40.9XX0 - POLYHYDRAMNIOS, UNSP 

TRIMESTER, NOT APPLICABLE OR UNSP   SNOMED Code(s): 47414913


   





(6) Spontaneous onset of labor


Current Visit: Yes   Status: Acute   Code(s): XVZ7206 -    SNOMED Code(s): 

57437194


   


Plan: 





Patient is doing well postoperatively,  Plan to  continue routine postoperative 

care and anticipate discharge home tomorrow.

## 2022-08-04 NOTE — P.PN
Progress Note - Text


Date: 2022 Time: 07:23


The patient is status post  section


Vital signs stable


VAS: 0-10


Patient has no complaints of pain.  The patient incurred some minimal itching 

yesterday, this itching is  now subsiding.


Pain meds to be managed by service.

## 2022-08-05 VITALS — RESPIRATION RATE: 18 BRPM

## 2022-08-05 LAB
ALT SERPL-CCNC: 24 U/L (ref 4–34)
AST SERPL-CCNC: 54 U/L (ref 14–36)

## 2022-08-05 RX ADMIN — ACETAMINOPHEN SCH MG: 500 TABLET ORAL at 22:33

## 2022-08-05 RX ADMIN — IBUPROFEN SCH: 600 TABLET ORAL at 16:14

## 2022-08-05 RX ADMIN — ACETAMINOPHEN SCH: 500 TABLET ORAL at 16:57

## 2022-08-05 RX ADMIN — ACETAMINOPHEN SCH MG: 500 TABLET ORAL at 06:39

## 2022-08-05 RX ADMIN — ACETAMINOPHEN SCH MG: 500 TABLET ORAL at 16:13

## 2022-08-05 RX ADMIN — IBUPROFEN SCH: 600 TABLET ORAL at 06:38

## 2022-08-05 RX ADMIN — IBUPROFEN SCH MG: 600 TABLET ORAL at 19:37

## 2022-08-05 RX ADMIN — IBUPROFEN SCH MG: 600 TABLET ORAL at 08:45

## 2022-08-05 RX ADMIN — DOCUSATE SODIUM AND SENNOSIDES SCH: 50; 8.6 TABLET ORAL at 08:42

## 2022-08-05 RX ADMIN — DOCUSATE SODIUM AND SENNOSIDES SCH EACH: 50; 8.6 TABLET ORAL at 19:36

## 2022-08-05 NOTE — P.DS
Providers


Date of admission: 


22 10:53





Expected date of discharge: 22


Attending physician: 


Jasvir Gan





Primary care physician: 


Stated None








- Discharge Diagnosis(es)


(1) S/P  section


Current Visit: Yes   Status: Acute   





(2) Advanced maternal age (AMA) in pregnancy


Current Visit: Yes   Status: Acute   





(3) Family planning


Current Visit: Yes   Status: Acute   





(4) History of 


Current Visit: Yes   Status: Acute   





(5) Polyhydramnios


Current Visit: Yes   Status: Acute   





(6) Spontaneous onset of labor


Current Visit: Yes   Status: Acute   


Hospital Course: 





This is a 37-year-old  4 now para 3013 that presented at 38 2/7 weeks, 

estimated due date of 8:15 based on last menstrual period and consistent with a 

9 week ultrasound.  Patient presented with complaints of regular painful 

contractions for 12+ hours.  Patient denied loss of fluid at that time.  Patient

was being followed by Dr. Gan for polyhydramnios and suspected large for 

gestational age on ultrasound.  Patient is a history of 2 prior C-sections and 

is scheduled for repeat  section with tubal ligation at 39 weeks.  

Patient had been counseled on risks and benefits for tubal ligation and 

requested tubal ligation to be done at the time of .  Blood pressures 

initially in triage were noted to be 150s over 90s preeclampsia labs were done 

showing normal platelets with a slightly elevated AST/ ALT.   Patient was taken 

back for repeat  section which was performed without difficulty.  For 

full details on the  please see the operative report.  Viable male 

infant delivered, weight of 8 lbs. 13 oz., Apgars of 8 and 9 at one and 5 

minutes respectively.  Extensive scarring of the bladder to the anterior uterine

wall was appreciated.  Mild meconium-stained membranes were appreciated upon 

delivery of the placenta.


Patient's postoperative course has been uneventful.  Liver function tests were 

repeated on postoperative day #1 AST remained elevated at 50.  Blood pressures 

have been well controlled 120-130's over 70s to 80s.  She denies any signs or 

symptoms of pre-eclampsia.  


On this postoperative day #2 she is ambulating and voiding without difficulty.  

She is tolerating a regular diet without nausea or vomiting.  She states her 

pain is well-controlled with oral ibuprofen and Tylenol.  Her lochia is minimal.


We will monitor blood pressures today and repeat liver function tests.  If liver

function test is trending down discharge home. 


Plan for close follow-up in one week with Dr. Gna for blood pressure 

check.


Patient Condition at Discharge: Good





Plan - Discharge Summary


New Discharge Prescriptions: 


No Action


   carBAMazepine [TEGretol] 200 mg PO Q12H


   FLUoxetine HCL [PROzac] 20 mg PO DAILY


   lamoTRIgine [LaMICtal] 150 mg PO BID


   Aspirin 1 tab PO DAILY


   Prenatal Vit No.179/Iron/Folic [Prenatal Tablet] 1 tab PO DAILY


Discharge Medication List





carBAMazepine [TEGretol] 200 mg PO Q12H 17 [History]


Aspirin 1 tab PO DAILY 22 [History]


FLUoxetine HCL [PROzac] 20 mg PO DAILY 22 [History]


Prenatal Vit No.179/Iron/Folic [Prenatal Tablet] 1 tab PO DAILY 22 [His

tory]


lamoTRIgine [LaMICtal] 150 mg PO BID 22 [History]








Follow up Appointment(s)/Referral(s): 


Jasvir Gan MD [STAFF PHYSICIAN] - 1 Week


Patient Instructions/Handouts:   (GEN),  (DC)


Discharge Disposition: HOME SELF-CARE

## 2022-08-06 VITALS — DIASTOLIC BLOOD PRESSURE: 81 MMHG | SYSTOLIC BLOOD PRESSURE: 128 MMHG | HEART RATE: 73 BPM

## 2022-08-06 VITALS — TEMPERATURE: 98.2 F

## 2022-08-06 LAB
ALT SERPL-CCNC: 24 U/L (ref 4–34)
AST SERPL-CCNC: 47 U/L (ref 14–36)

## 2022-08-06 RX ADMIN — IBUPROFEN SCH MG: 600 TABLET ORAL at 01:34

## 2022-08-06 RX ADMIN — ACETAMINOPHEN SCH MG: 500 TABLET ORAL at 04:20

## 2022-08-06 RX ADMIN — IBUPROFEN SCH MG: 600 TABLET ORAL at 08:01

## 2022-08-06 RX ADMIN — DOCUSATE SODIUM AND SENNOSIDES SCH: 50; 8.6 TABLET ORAL at 08:27

## 2022-08-06 NOTE — P.DS
Providers


Date of admission: 


22 10:53





Expected date of discharge: 22


Attending physician: 


Jasvir Gan





Primary care physician: 


Stated None





Hospital Course: 





This is a 37-year-old female  3 para  who presented at 38-2/7 weeks 

in labor, for repeat  section.  She also requested tubal ligation.  

Blood pressure on admission 146/85.  Pregnancy otherwise unremarkable, except 

for advanced maternal age.  Rubella status immune.  Group B strep cultures 

negative.  Please see dictated history and physical for details.





Patient underwent a repeat low transverse  section with tubal ligation, 

giving birth to a liveborn male infant with Apgar scores of 7 and 9 at one and 5

minutes respectively.  Infant weighed 4000 g or 8 lbs. 13 oz.  She did well 

intraoperatively, please see dictated operative note for details.





The patient has done well postoperatively.  Her blood pressure however this 

morning was again slightly elevated, normalized to 120s over 70s on labetalol 

100 mg.  Her breasts are not engorged, incision is clean and dry, fundus is 

firm, midline, symmetric, 18 week size.  Extremities are negative for edema.  

She has normal reflexes bilaterally.  I have given her prescription to continue 

labetalol 100 mg twice a day.  She has a sphygo- manometer at home, will take 

her blood pressures daily, chart time, and bring them to the office in 2 weeks. 

She will call with any fevers shakes or chills, foul smelling or copious lochia,

with the passage of large blood clots, with any pain not alleviated by Advil 

Motrin or Aleve, or indeed with any concerns.  She is also reminded to notify us

with any headache, visual changes, or right upper quadrant pain.  Strong infant

will follow-up with pediatrician as per recommendations.


Assessment: 


Doing well third postoperative day, mild hypertension


Patient Condition at Discharge: Good





Plan - Discharge Summary


Discharge Rx Participant: No


New Discharge Prescriptions: 


No Action


   carBAMazepine [TEGretol] 200 mg PO Q12H


   FLUoxetine HCL [PROzac] 20 mg PO DAILY


   lamoTRIgine [LaMICtal] 150 mg PO BID


   Aspirin 1 tab PO DAILY


   Prenatal Vit No.179/Iron/Folic [Prenatal Tablet] 1 tab PO DAILY


Discharge Medication List





carBAMazepine [TEGretol] 200 mg PO Q12H 17 [History]


Aspirin 1 tab PO DAILY 22 [History]


FLUoxetine HCL [PROzac] 20 mg PO DAILY 22 [History]


Prenatal Vit No.179/Iron/Folic [Prenatal Tablet] 1 tab PO DAILY 22 

[History]


lamoTRIgine [LaMICtal] 150 mg PO BID 22 [History]








Follow up Appointment(s)/Referral(s): 


Jasvir Gan MD [STAFF PHYSICIAN] - 2 Weeks


Patient Instructions/Handouts:   (DC),  (GEN)


Discharge Disposition: HOME SELF-CARE

## 2023-05-18 ENCOUNTER — HOSPITAL ENCOUNTER (OUTPATIENT)
Dept: HOSPITAL 47 - RADMAMWWP | Age: 38
Discharge: HOME | End: 2023-05-18
Attending: FAMILY MEDICINE
Payer: COMMERCIAL

## 2023-05-18 DIAGNOSIS — Z12.31: Primary | ICD-10-CM

## 2023-05-18 PROCEDURE — 77063 BREAST TOMOSYNTHESIS BI: CPT

## 2023-05-18 PROCEDURE — 77067 SCR MAMMO BI INCL CAD: CPT

## 2023-05-19 NOTE — MM
Reason for Exam: Screening  (asymptomatic). 

Last mammogram was performed 1 year(s) and 10 month(s) ago. 





Patient History: 

Menarche at age 10. First Full-Term Pregnancy at age 23. Premenopausal. Hormonal Contraceptives,

from age 27 until age 33.

Last menstrual period: 05/18/2023





Risk Values: 

Radha 5 year model risk: 0.6%.

NCI Lifetime model risk: 11.1%.





Prior Study Comparison: 

7/26/2021 Bilateral Screening Mammogram, [facility]. 





Tissue Density: 

The breast tissue is heterogeneously dense. This may lower the sensitivity of mammography.





Findings: 

Analyzed By CAD. 

There is no suspicious group of microcalcifications or new suspicious mass in either breast. 





Overall Assessment: Negative, BI-RAD 1





Management: 

Screening Mammogram of both breasts in 1 year.

.



Patient should continue monthly self-breast exams.  A clinical breast exam by your physician is

recommended on an annual basis.

This exam should not preclude additional follow-up of suspicious palpable abnormalities.



Note on Radha scores and lifetime risk:

1. A Radha score greater than 3% is considered moderate risk. If this is the case, consider

specialist referral to assess eligibility for a risk reducing agent.

2. If overall lifetime risk for the development of breast cancer is 20% or higher, the patient may

qualify for future screening with alternating mammogram and breast MRI.



Electronically signed and approved by: Elda Goldberg M.D. Radiologist

## 2024-04-20 ENCOUNTER — HOSPITAL ENCOUNTER (INPATIENT)
Facility: HOSPITAL | Age: 39
LOS: 11 days | Discharge: HOME | DRG: 432 | End: 2024-05-01
Attending: STUDENT IN AN ORGANIZED HEALTH CARE EDUCATION/TRAINING PROGRAM | Admitting: STUDENT IN AN ORGANIZED HEALTH CARE EDUCATION/TRAINING PROGRAM

## 2024-04-20 DIAGNOSIS — K72.90 DECOMPENSATED HEPATIC CIRRHOSIS: ICD-10-CM

## 2024-04-20 DIAGNOSIS — R94.31 QT PROLONGATION: ICD-10-CM

## 2024-04-20 DIAGNOSIS — K74.60 DECOMPENSATED HEPATIC CIRRHOSIS: ICD-10-CM

## 2024-04-20 DIAGNOSIS — Z01.818 ENCOUNTER FOR PRE-TRANSPLANT EVALUATION FOR LIVER TRANSPLANT: ICD-10-CM

## 2024-04-20 DIAGNOSIS — R07.9 CHEST PAIN: ICD-10-CM

## 2024-04-20 DIAGNOSIS — K72.00 ACUTE LIVER FAILURE WITHOUT HEPATIC COMA: ICD-10-CM

## 2024-04-20 DIAGNOSIS — D64.9 ANEMIA, UNSPECIFIED TYPE: Primary | ICD-10-CM

## 2024-04-20 DIAGNOSIS — K92.1 MELENA: ICD-10-CM

## 2024-04-20 DIAGNOSIS — D62 ACUTE BLOOD LOSS ANEMIA: ICD-10-CM

## 2024-04-20 DIAGNOSIS — D64.9 ANEMIA: ICD-10-CM

## 2024-04-20 DIAGNOSIS — F10.930 ALCOHOL WITHDRAWAL, UNCOMPLICATED: ICD-10-CM

## 2024-04-20 DIAGNOSIS — K70.30 ALCOHOLIC CIRRHOSIS, UNSPECIFIED WHETHER ASCITES PRESENT: ICD-10-CM

## 2024-04-20 DIAGNOSIS — F10.90 ALCOHOL USE DISORDER: ICD-10-CM

## 2024-04-20 DIAGNOSIS — N17.9 AKI (ACUTE KIDNEY INJURY): ICD-10-CM

## 2024-04-20 PROBLEM — E87.1 HYPONATREMIA: Status: ACTIVE | Noted: 2024-04-20

## 2024-04-20 PROBLEM — K92.2 UGIB (UPPER GASTROINTESTINAL BLEED): Status: ACTIVE | Noted: 2024-04-20

## 2024-04-20 PROBLEM — N30.00 ACUTE CYSTITIS: Status: ACTIVE | Noted: 2024-04-20

## 2024-04-20 PROBLEM — D68.9 COAGULOPATHY: Status: ACTIVE | Noted: 2024-04-20

## 2024-04-20 PROBLEM — E83.42 HYPOMAGNESEMIA: Status: ACTIVE | Noted: 2024-04-20

## 2024-04-20 PROBLEM — N39.0 UTI (URINARY TRACT INFECTION): Status: ACTIVE | Noted: 2024-04-20

## 2024-04-20 LAB
A1AT SERPL-MCNC: 158 MG/DL (ref 100–190)
ABO + RH BLD: NORMAL
ALBUMIN SERPL BCP-MCNC: 2.4 G/DL (ref 3.5–5.2)
ALP SERPL-CCNC: 161 U/L (ref 55–135)
ALT SERPL W/O P-5'-P-CCNC: 47 U/L (ref 10–44)
AMMONIA PLAS-SCNC: 68 UMOL/L (ref 10–50)
ANION GAP SERPL CALC-SCNC: 22 MMOL/L (ref 8–16)
ANION GAP SERPL CALC-SCNC: 22 MMOL/L (ref 8–16)
AST SERPL-CCNC: 133 U/L (ref 10–40)
BASOPHILS # BLD AUTO: 0.02 K/UL (ref 0–0.2)
BASOPHILS # BLD AUTO: 0.02 K/UL (ref 0–0.2)
BASOPHILS NFR BLD: 0.2 % (ref 0–1.9)
BASOPHILS NFR BLD: 0.2 % (ref 0–1.9)
BILIRUB SERPL-MCNC: 21.7 MG/DL (ref 0.1–1)
BLD GP AB SCN CELLS X3 SERPL QL: NORMAL
BLD PROD TYP BPU: NORMAL
BLD PROD TYP BPU: NORMAL
BLOOD UNIT EXPIRATION DATE: NORMAL
BLOOD UNIT EXPIRATION DATE: NORMAL
BLOOD UNIT TYPE CODE: 1700
BLOOD UNIT TYPE CODE: 1700
BLOOD UNIT TYPE: NORMAL
BLOOD UNIT TYPE: NORMAL
BUN SERPL-MCNC: 11 MG/DL (ref 6–20)
BUN SERPL-MCNC: 12 MG/DL (ref 6–20)
CALCIUM SERPL-MCNC: 6.7 MG/DL (ref 8.7–10.5)
CALCIUM SERPL-MCNC: 6.9 MG/DL (ref 8.7–10.5)
CHLORIDE SERPL-SCNC: 77 MMOL/L (ref 95–110)
CHLORIDE SERPL-SCNC: 78 MMOL/L (ref 95–110)
CO2 SERPL-SCNC: 21 MMOL/L (ref 23–29)
CO2 SERPL-SCNC: 22 MMOL/L (ref 23–29)
CODING SYSTEM: NORMAL
CODING SYSTEM: NORMAL
CREAT SERPL-MCNC: 0.7 MG/DL (ref 0.5–1.4)
CREAT SERPL-MCNC: 0.8 MG/DL (ref 0.5–1.4)
CROSSMATCH INTERPRETATION: NORMAL
CROSSMATCH INTERPRETATION: NORMAL
DIFFERENTIAL METHOD BLD: ABNORMAL
DIFFERENTIAL METHOD BLD: ABNORMAL
DISPENSE STATUS: NORMAL
DISPENSE STATUS: NORMAL
EOSINOPHIL # BLD AUTO: 0.1 K/UL (ref 0–0.5)
EOSINOPHIL # BLD AUTO: 0.1 K/UL (ref 0–0.5)
EOSINOPHIL NFR BLD: 0.7 % (ref 0–8)
EOSINOPHIL NFR BLD: 1.1 % (ref 0–8)
ERYTHROCYTE [DISTWIDTH] IN BLOOD BY AUTOMATED COUNT: 18.4 % (ref 11.5–14.5)
ERYTHROCYTE [DISTWIDTH] IN BLOOD BY AUTOMATED COUNT: 18.7 % (ref 11.5–14.5)
EST. GFR  (NO RACE VARIABLE): >60 ML/MIN/1.73 M^2
EST. GFR  (NO RACE VARIABLE): >60 ML/MIN/1.73 M^2
FIBRINOGEN PPP-MCNC: 105 MG/DL (ref 182–400)
GLUCOSE SERPL-MCNC: 104 MG/DL (ref 70–110)
GLUCOSE SERPL-MCNC: 89 MG/DL (ref 70–110)
HAPTOGLOB SERPL-MCNC: <10 MG/DL (ref 30–250)
HAV IGM SERPL QL IA: NORMAL
HBV CORE IGM SERPL QL IA: NORMAL
HBV SURFACE AG SERPL QL IA: NORMAL
HCT VFR BLD AUTO: 17.5 % (ref 37–48.5)
HCT VFR BLD AUTO: 19.1 % (ref 37–48.5)
HCV AB SERPL QL IA: NORMAL
HGB BLD-MCNC: 6.2 G/DL (ref 12–16)
HGB BLD-MCNC: 6.9 G/DL (ref 12–16)
IMM GRANULOCYTES # BLD AUTO: 0.11 K/UL (ref 0–0.04)
IMM GRANULOCYTES # BLD AUTO: 0.12 K/UL (ref 0–0.04)
IMM GRANULOCYTES NFR BLD AUTO: 0.9 % (ref 0–0.5)
IMM GRANULOCYTES NFR BLD AUTO: 1.1 % (ref 0–0.5)
INR PPP: 2.2 (ref 0.8–1.2)
INR PPP: 2.5 (ref 0.8–1.2)
LACTATE SERPL-SCNC: 11.2 MMOL/L (ref 0.5–2.2)
LACTATE SERPL-SCNC: 8.4 MMOL/L (ref 0.5–2.2)
LDH SERPL L TO P-CCNC: 741 U/L (ref 110–260)
LIPASE SERPL-CCNC: 52 U/L (ref 4–60)
LYMPHOCYTES # BLD AUTO: 0.9 K/UL (ref 1–4.8)
LYMPHOCYTES # BLD AUTO: 1 K/UL (ref 1–4.8)
LYMPHOCYTES NFR BLD: 7.4 % (ref 18–48)
LYMPHOCYTES NFR BLD: 9.2 % (ref 18–48)
MAGNESIUM SERPL-MCNC: 1.7 MG/DL (ref 1.6–2.6)
MCH RBC QN AUTO: 43.1 PG (ref 27–31)
MCH RBC QN AUTO: 44.5 PG (ref 27–31)
MCHC RBC AUTO-ENTMCNC: 35.4 G/DL (ref 32–36)
MCHC RBC AUTO-ENTMCNC: 36.1 G/DL (ref 32–36)
MCV RBC AUTO: 122 FL (ref 82–98)
MCV RBC AUTO: 123 FL (ref 82–98)
MONOCYTES # BLD AUTO: 1.5 K/UL (ref 0.3–1)
MONOCYTES # BLD AUTO: 1.8 K/UL (ref 0.3–1)
MONOCYTES NFR BLD: 14.4 % (ref 4–15)
MONOCYTES NFR BLD: 14.6 % (ref 4–15)
NEUTROPHILS # BLD AUTO: 7.9 K/UL (ref 1.8–7.7)
NEUTROPHILS # BLD AUTO: 9.5 K/UL (ref 1.8–7.7)
NEUTROPHILS NFR BLD: 74 % (ref 38–73)
NEUTROPHILS NFR BLD: 76.2 % (ref 38–73)
NRBC BLD-RTO: 1 /100 WBC
NRBC BLD-RTO: 1 /100 WBC
NUM UNITS TRANS PACKED RBC: NORMAL
PHOSPHATE SERPL-MCNC: 3.5 MG/DL (ref 2.7–4.5)
PLATELET # BLD AUTO: 42 K/UL (ref 150–450)
PLATELET # BLD AUTO: 60 K/UL (ref 150–450)
PMV BLD AUTO: 10.5 FL (ref 9.2–12.9)
PMV BLD AUTO: 10.5 FL (ref 9.2–12.9)
POTASSIUM SERPL-SCNC: 2.7 MMOL/L (ref 3.5–5.1)
POTASSIUM SERPL-SCNC: 2.9 MMOL/L (ref 3.5–5.1)
PROT SERPL-MCNC: 5.5 G/DL (ref 6–8.4)
PROTHROMBIN TIME: 22.7 SEC (ref 9–12.5)
PROTHROMBIN TIME: 25.8 SEC (ref 9–12.5)
RBC # BLD AUTO: 1.44 M/UL (ref 4–5.4)
RBC # BLD AUTO: 1.55 M/UL (ref 4–5.4)
SODIUM SERPL-SCNC: 120 MMOL/L (ref 136–145)
SODIUM SERPL-SCNC: 122 MMOL/L (ref 136–145)
SPECIMEN OUTDATE: NORMAL
UNIT NUMBER: NORMAL
WBC # BLD AUTO: 10.71 K/UL (ref 3.9–12.7)
WBC # BLD AUTO: 12.47 K/UL (ref 3.9–12.7)

## 2024-04-20 PROCEDURE — 30233N1 TRANSFUSION OF NONAUTOLOGOUS RED BLOOD CELLS INTO PERIPHERAL VEIN, PERCUTANEOUS APPROACH: ICD-10-PCS | Performed by: HOSPITALIST

## 2024-04-20 PROCEDURE — 86920 COMPATIBILITY TEST SPIN: CPT

## 2024-04-20 PROCEDURE — 83735 ASSAY OF MAGNESIUM: CPT

## 2024-04-20 PROCEDURE — P9016 RBC LEUKOCYTES REDUCED: HCPCS

## 2024-04-20 PROCEDURE — 87040 BLOOD CULTURE FOR BACTERIA: CPT | Mod: 59

## 2024-04-20 PROCEDURE — 93005 ELECTROCARDIOGRAM TRACING: CPT

## 2024-04-20 PROCEDURE — 85610 PROTHROMBIN TIME: CPT | Mod: 91

## 2024-04-20 PROCEDURE — 30233R1 TRANSFUSION OF NONAUTOLOGOUS PLATELETS INTO PERIPHERAL VEIN, PERCUTANEOUS APPROACH: ICD-10-PCS | Performed by: HOSPITALIST

## 2024-04-20 PROCEDURE — 82140 ASSAY OF AMMONIA: CPT

## 2024-04-20 PROCEDURE — P9035 PLATELET PHERES LEUKOREDUCED: HCPCS

## 2024-04-20 PROCEDURE — 63600175 PHARM REV CODE 636 W HCPCS: Mod: JA

## 2024-04-20 PROCEDURE — 86015 ACTIN ANTIBODY EACH: CPT

## 2024-04-20 PROCEDURE — 25000003 PHARM REV CODE 250: Performed by: STUDENT IN AN ORGANIZED HEALTH CARE EDUCATION/TRAINING PROGRAM

## 2024-04-20 PROCEDURE — 25000003 PHARM REV CODE 250

## 2024-04-20 PROCEDURE — 80321 ALCOHOLS BIOMARKERS 1OR 2: CPT

## 2024-04-20 PROCEDURE — 86901 BLOOD TYPING SEROLOGIC RH(D): CPT | Performed by: STUDENT IN AN ORGANIZED HEALTH CARE EDUCATION/TRAINING PROGRAM

## 2024-04-20 PROCEDURE — 83690 ASSAY OF LIPASE: CPT

## 2024-04-20 PROCEDURE — 85384 FIBRINOGEN ACTIVITY: CPT | Performed by: STUDENT IN AN ORGANIZED HEALTH CARE EDUCATION/TRAINING PROGRAM

## 2024-04-20 PROCEDURE — 83605 ASSAY OF LACTIC ACID: CPT

## 2024-04-20 PROCEDURE — 63600175 PHARM REV CODE 636 W HCPCS: Performed by: STUDENT IN AN ORGANIZED HEALTH CARE EDUCATION/TRAINING PROGRAM

## 2024-04-20 PROCEDURE — 82103 ALPHA-1-ANTITRYPSIN TOTAL: CPT

## 2024-04-20 PROCEDURE — 36415 COLL VENOUS BLD VENIPUNCTURE: CPT | Mod: XB | Performed by: STUDENT IN AN ORGANIZED HEALTH CARE EDUCATION/TRAINING PROGRAM

## 2024-04-20 PROCEDURE — 80053 COMPREHEN METABOLIC PANEL: CPT

## 2024-04-20 PROCEDURE — 83615 LACTATE (LD) (LDH) ENZYME: CPT | Performed by: STUDENT IN AN ORGANIZED HEALTH CARE EDUCATION/TRAINING PROGRAM

## 2024-04-20 PROCEDURE — 86381 MITOCHONDRIAL ANTIBODY EACH: CPT

## 2024-04-20 PROCEDURE — 85025 COMPLETE CBC W/AUTO DIFF WBC: CPT | Mod: 91 | Performed by: STUDENT IN AN ORGANIZED HEALTH CARE EDUCATION/TRAINING PROGRAM

## 2024-04-20 PROCEDURE — 84100 ASSAY OF PHOSPHORUS: CPT

## 2024-04-20 PROCEDURE — 36430 TRANSFUSION BLD/BLD COMPNT: CPT

## 2024-04-20 PROCEDURE — 63600175 PHARM REV CODE 636 W HCPCS

## 2024-04-20 PROCEDURE — 85610 PROTHROMBIN TIME: CPT | Performed by: STUDENT IN AN ORGANIZED HEALTH CARE EDUCATION/TRAINING PROGRAM

## 2024-04-20 PROCEDURE — 93010 ELECTROCARDIOGRAM REPORT: CPT | Mod: ,,, | Performed by: INTERNAL MEDICINE

## 2024-04-20 PROCEDURE — 80074 ACUTE HEPATITIS PANEL: CPT

## 2024-04-20 PROCEDURE — 36415 COLL VENOUS BLD VENIPUNCTURE: CPT

## 2024-04-20 PROCEDURE — 80048 BASIC METABOLIC PNL TOTAL CA: CPT | Mod: XB | Performed by: STUDENT IN AN ORGANIZED HEALTH CARE EDUCATION/TRAINING PROGRAM

## 2024-04-20 PROCEDURE — 83605 ASSAY OF LACTIC ACID: CPT | Mod: 91 | Performed by: STUDENT IN AN ORGANIZED HEALTH CARE EDUCATION/TRAINING PROGRAM

## 2024-04-20 PROCEDURE — C9113 INJ PANTOPRAZOLE SODIUM, VIA: HCPCS

## 2024-04-20 PROCEDURE — 20600001 HC STEP DOWN PRIVATE ROOM

## 2024-04-20 PROCEDURE — 83010 ASSAY OF HAPTOGLOBIN QUANT: CPT | Performed by: STUDENT IN AN ORGANIZED HEALTH CARE EDUCATION/TRAINING PROGRAM

## 2024-04-20 PROCEDURE — 30233M1 TRANSFUSION OF NONAUTOLOGOUS PLASMA CRYOPRECIPITATE INTO PERIPHERAL VEIN, PERCUTANEOUS APPROACH: ICD-10-PCS | Performed by: HOSPITALIST

## 2024-04-20 RX ORDER — THIAMINE HCL 100 MG
100 TABLET ORAL DAILY
Status: DISCONTINUED | OUTPATIENT
Start: 2024-04-20 | End: 2024-04-20

## 2024-04-20 RX ORDER — MUPIROCIN 20 MG/G
OINTMENT TOPICAL 2 TIMES DAILY
Status: COMPLETED | OUTPATIENT
Start: 2024-04-20 | End: 2024-04-25

## 2024-04-20 RX ORDER — IBUPROFEN 200 MG
16 TABLET ORAL
Status: DISCONTINUED | OUTPATIENT
Start: 2024-04-20 | End: 2024-05-02 | Stop reason: HOSPADM

## 2024-04-20 RX ORDER — NALOXONE HCL 0.4 MG/ML
0.02 VIAL (ML) INJECTION
Status: DISCONTINUED | OUTPATIENT
Start: 2024-04-20 | End: 2024-05-02 | Stop reason: HOSPADM

## 2024-04-20 RX ORDER — LORAZEPAM 2 MG/ML
2 INJECTION INTRAMUSCULAR
Status: CANCELLED | OUTPATIENT
Start: 2024-04-20

## 2024-04-20 RX ORDER — HYDROCODONE BITARTRATE AND ACETAMINOPHEN 500; 5 MG/1; MG/1
TABLET ORAL
Status: DISCONTINUED | OUTPATIENT
Start: 2024-04-20 | End: 2024-04-22

## 2024-04-20 RX ORDER — POTASSIUM CHLORIDE 7.45 MG/ML
10 INJECTION INTRAVENOUS
Status: DISPENSED | OUTPATIENT
Start: 2024-04-20 | End: 2024-04-21

## 2024-04-20 RX ORDER — PANTOPRAZOLE SODIUM 40 MG/10ML
40 INJECTION, POWDER, LYOPHILIZED, FOR SOLUTION INTRAVENOUS 2 TIMES DAILY
Status: DISCONTINUED | OUTPATIENT
Start: 2024-04-21 | End: 2024-04-24

## 2024-04-20 RX ORDER — FOLIC ACID 1 MG/1
1 TABLET ORAL DAILY
Status: DISCONTINUED | OUTPATIENT
Start: 2024-04-20 | End: 2024-05-02 | Stop reason: HOSPADM

## 2024-04-20 RX ORDER — MAGNESIUM SULFATE HEPTAHYDRATE 40 MG/ML
2 INJECTION, SOLUTION INTRAVENOUS ONCE
Status: COMPLETED | OUTPATIENT
Start: 2024-04-20 | End: 2024-04-20

## 2024-04-20 RX ORDER — GLUCAGON 1 MG
1 KIT INJECTION
Status: DISCONTINUED | OUTPATIENT
Start: 2024-04-20 | End: 2024-05-02 | Stop reason: HOSPADM

## 2024-04-20 RX ORDER — LORAZEPAM 1 MG/1
2 TABLET ORAL EVERY 4 HOURS PRN
Status: DISCONTINUED | OUTPATIENT
Start: 2024-04-20 | End: 2024-04-20

## 2024-04-20 RX ORDER — LORAZEPAM 1 MG/1
2 TABLET ORAL EVERY 4 HOURS PRN
Status: DISCONTINUED | OUTPATIENT
Start: 2024-04-20 | End: 2024-04-22

## 2024-04-20 RX ORDER — IBUPROFEN 200 MG
24 TABLET ORAL
Status: DISCONTINUED | OUTPATIENT
Start: 2024-04-20 | End: 2024-05-02 | Stop reason: HOSPADM

## 2024-04-20 RX ORDER — OCTREOTIDE ACETATE 50 UG/ML
50 INJECTION, SOLUTION INTRAVENOUS; SUBCUTANEOUS ONCE
Status: COMPLETED | OUTPATIENT
Start: 2024-04-20 | End: 2024-04-20

## 2024-04-20 RX ORDER — PANTOPRAZOLE SODIUM 40 MG/10ML
80 INJECTION, POWDER, LYOPHILIZED, FOR SOLUTION INTRAVENOUS ONCE
Status: COMPLETED | OUTPATIENT
Start: 2024-04-20 | End: 2024-04-20

## 2024-04-20 RX ORDER — SODIUM CHLORIDE 0.9 % (FLUSH) 0.9 %
10 SYRINGE (ML) INJECTION EVERY 12 HOURS PRN
Status: DISCONTINUED | OUTPATIENT
Start: 2024-04-20 | End: 2024-05-02 | Stop reason: HOSPADM

## 2024-04-20 RX ADMIN — Medication 100 MG: at 03:04

## 2024-04-20 RX ADMIN — POTASSIUM CHLORIDE 10 MEQ: 7.46 INJECTION, SOLUTION INTRAVENOUS at 10:04

## 2024-04-20 RX ADMIN — OCTREOTIDE ACETATE 50 MCG: 50 INJECTION, SOLUTION INTRAVENOUS; SUBCUTANEOUS at 05:04

## 2024-04-20 RX ADMIN — PHYTONADIONE 10 MG: 10 INJECTION, EMULSION INTRAMUSCULAR; INTRAVENOUS; SUBCUTANEOUS at 05:04

## 2024-04-20 RX ADMIN — FOLIC ACID 1 MG: 1 TABLET ORAL at 03:04

## 2024-04-20 RX ADMIN — POTASSIUM CHLORIDE 10 MEQ: 7.46 INJECTION, SOLUTION INTRAVENOUS at 08:04

## 2024-04-20 RX ADMIN — POTASSIUM CHLORIDE 10 MEQ: 7.46 INJECTION, SOLUTION INTRAVENOUS at 09:04

## 2024-04-20 RX ADMIN — OCTREOTIDE ACETATE 50 MCG/HR: 500 INJECTION, SOLUTION INTRAVENOUS; SUBCUTANEOUS at 05:04

## 2024-04-20 RX ADMIN — THIAMINE HYDROCHLORIDE 500 MG: 100 INJECTION, SOLUTION INTRAMUSCULAR; INTRAVENOUS at 10:04

## 2024-04-20 RX ADMIN — POTASSIUM CHLORIDE 10 MEQ: 7.46 INJECTION, SOLUTION INTRAVENOUS at 05:04

## 2024-04-20 RX ADMIN — MUPIROCIN: 20 OINTMENT TOPICAL at 09:04

## 2024-04-20 RX ADMIN — VANCOMYCIN HYDROCHLORIDE 1500 MG: 1.5 INJECTION, POWDER, LYOPHILIZED, FOR SOLUTION INTRAVENOUS at 03:04

## 2024-04-20 RX ADMIN — THERA TABS 1 TABLET: TAB at 03:04

## 2024-04-20 RX ADMIN — POTASSIUM CHLORIDE 10 MEQ: 7.46 INJECTION, SOLUTION INTRAVENOUS at 11:04

## 2024-04-20 RX ADMIN — PANTOPRAZOLE SODIUM 80 MG: 40 INJECTION, POWDER, FOR SOLUTION INTRAVENOUS at 05:04

## 2024-04-20 RX ADMIN — MAGNESIUM SULFATE HEPTAHYDRATE 2 G: 40 INJECTION, SOLUTION INTRAVENOUS at 05:04

## 2024-04-20 NOTE — ASSESSMENT & PLAN NOTE
"Patient has hyponatremia which is uncontrolled,We will aim to correct the sodium by 4-6mEq in 24 hours. We will monitor sodium Daily. The hyponatremia is due to Cirrhosis. We will obtain the following studies: Urine sodium, urine osmolality, serum osmolality. We will treat the hyponatremia with Fluid restriction of:  1.5 liter per day. The patient's sodium results have been reviewed and are listed below.  No results for input(s): "NA" in the last 24 hours.  "

## 2024-04-20 NOTE — ACP (ADVANCE CARE PLANNING)
"Patient with liver failure in the setting of recently diagnosed ETOH cirrhosis. She is having melena concerning for possible variceal bleed. She is currently not encephalopathic, but ammonia is elevated to 68.    Advance Care Planning     Date: 04/20/2024    Discussed patient's diagnosis of liver failure and GI bleed with her. This is all a shock to her as she was recently diagnosed. She is still processing all of this. She was told by the doctors at the outside hospital that she had "90 days to live" and asked if this was true. I informed her her liver failure was very severe and it would not surprise me if things worsened from here.     I obtained her preference for "first contact" which is her boyfriend Daljit Fernandez. If a second contact is needed, it would be his mother Joanie (numbers in chart). Explained we would not contact them unless she became confused/unable to speak for herself, which happens sometimes with liver failure.    The patient has 4 children, as young as 10 years old, and as old as 17. She was previously  but is legally . Her parents are still alive but she does not speak to them.     Her goals at this time include finding out more information and what options are available to her.    I spent a total of 15 minutes engaging the patient in this advance care planning discussion.            "

## 2024-04-20 NOTE — CONSULTS
Pharmacokinetic Initial Assessment: IV Vancomycin    Assessment/Plan:    -Initiate vancomycin 1500 mg IVPB x 1 (21 mg/kg, 72 kg) followed by 1250 mg IVPB Q12H (17 mg/kg)  -Trough prior to 4th dose on 04/22 @ 0200  -Outside facility records reviewed in media tab, Scr 0.88 and sodium 120  -Vancomycin mixed in NS due to hyponatremia  -Goal trough 15 - 20 for sepsis    Please contact pharmacy at extension [phone] with any questions regarding this assessment.     Thank you for the consult,   Lincoln Yao       Patient brief summary:  Carola Tovar is a 38 y.o. female initiated on antimicrobial therapy with IV Vancomycin for treatment of suspected sepsis    Drug Allergies:   Review of patient's allergies indicates:   Allergen Reactions    Sulfa (sulfonamide antibiotics) Hives       Actual Body Weight:   72 kg    Renal Function:   CrCl = 97 mL/min    Dialysis Method (if applicable):  N/A    Microbiologic Results:  Microbiology Results (last 7 days)       ** No results found for the last 168 hours. **

## 2024-04-20 NOTE — HPI
Ms. Tovar is a 37 yo F with alcohol abuse disorder who presented to OSH ED with jaundice. Pt is originally from [place], but have spent the last 1.5 months in [place] visiting a friend. For the past month she has started noticing yellow discoloration in her eyes and skin. Pt reports that she used to drink 48 oz of vodka daily, but for the past week she has taper down to only 16 oz per day. Last drink was yesterday 04/19. She also reports multiple episodes of withdrawal in the past, but no seizure episodes. She also report dysuria and increased urinary frequency. Denies headaches, visual changes, SOB, cough, chest pain, palpitation, nausea, vomiting, abdominal pain, diarrhea, constipation.     At the OSH ED the patient was afebrile tachycardic to 120 with /42. Lab work remarkable for Hb/Hct 7.4/21.0, INR 2.57, Na 120, K 2.5, Mg 0.7. Tbili of 24. U/A nitrite positive. Given IV CTX, Mg and K replacements. Hep studies negative. CT A/P shows cirrhosis and portal HTN, gastric diffuse bowel wall thickening and jose-pancreatic fat stranding. Pt was transferred to [facility] for higher level of care. Pt admitted to hospital medicine for further management of acute on chronic liver failure and hepatology was consulted.

## 2024-04-20 NOTE — NURSING
Pt arrived to room 8098. Pt AAOx4 at time of arrival and able to ambulate from stretcher to hospital bed. Yellow skin noted. No other skin issues noted. Notifed DO Jens and MD Michael of pt's arrival to room 8098, that pt's HR is 114, and that pt's O2 sats are 92%. Educated pt on how to use the call light system and instructed her to call nurse for assistance with ambulating. Pt currently in bed with the call light in reach and the bed alarm on.

## 2024-04-20 NOTE — PLAN OF CARE
"38F with ETOH use disorder, previous withdrawals but not seizures, presenting for jaundice, found to be in liver failure.    Liver failure / cirrhosis- Suspect ETOH induced. Records not in [facility] but per  note newly diagnosed cirrhosis and "gastric diffuse bowel wall thickening and jose-pancreatic fat stranding" on CT A/P at [facility], Bili 24, INR 2.57, Plt 42, Currently not confused. Vanc and rocephin given bowel findings on imaging and tachycardia/possible SIRS. Abdominal US with doppler now, and paracentesis if ascites present.     GIB/Acute blood loss Anemia - Hgb 7.4 >> 6.9 upon arrival Reports tarry melena. Also reporting significant R flank pain. Obtain CTA to eval for active variceal bleed or RP hematoma. Transfuse 1U PRBC, 1U Plt, 1U cryo, and Vit K. Octreotide and protonix ordered. GI consulted      ETOH withdrawal - Not severe at this point. Last drink yesterday. HR responded well to ativan. CIWA and PRN benzos. Thiamine (can increase to high-dose if AMS develops).    Coagulopathy - repeat INR here; consider Vit K to assess response once repeat labs return    Hyponatremia - suspect 2/2 cirrhosis; f/u repeat here. Will need fluid restriction and low Na diet.    UTI - UA at OSH reportedly with nitrites; repeat here. Will be on abx.       "

## 2024-04-20 NOTE — SUBJECTIVE & OBJECTIVE
No past medical history on file.    No past surgical history on file.    Review of patient's allergies indicates:   Allergen Reactions    Sulfa (sulfonamide antibiotics) Hives       Current Facility-Administered Medications   Medication Dose Route Frequency Provider Last Rate Last Admin    [START ON 4/21/2024] cefTRIAXone (ROCEPHIN) 2 g in dextrose 5 % in water (D5W) 100 mL IVPB (MB+)  2 g Intravenous Q24H Antonio Colindres DO        dextrose 10% bolus 125 mL 125 mL  12.5 g Intravenous PRN Kobi Villarreal MD        dextrose 10% bolus 250 mL 250 mL  25 g Intravenous PRN Kobi Villarreal MD        folic acid tablet 1 mg  1 mg Oral Daily oKbi Villarreal MD        glucagon (human recombinant) injection 1 mg  1 mg Intramuscular PRN Kobi Villarreal MD        glucose chewable tablet 16 g  16 g Oral PRN Kobi Villarreal MD        glucose chewable tablet 24 g  24 g Oral PRN Kobi Villarreal MD        LORazepam tablet 2 mg  2 mg Oral Q4H PRN Antonio Colindres DO        multivitamin tablet  1 tablet Oral Daily Kobi Villarreal MD        mupirocin 2 % ointment   Nasal BID Theresa Alvarez MD        naloxone 0.4 mg/mL injection 0.02 mg  0.02 mg Intravenous PRN Kobi Villarreal MD        sodium chloride 0.9% flush 10 mL  10 mL Intravenous Q12H PRN Kobi Villarreal MD        thiamine tablet 100 mg  100 mg Oral Daily Kobi Villarreal MD        vancomycin - pharmacy to dose   Intravenous pharmacy to manage frequency Antonio Colindres DO        [START ON 4/21/2024] vancomycin 1,250 mg in sodium chloride 0.9% 250 mL IVPB (Vial-Mate)  1,250 mg Intravenous Q12H Theresa Alvarez MD        vancomycin 1,500 mg in sodium chloride 0.9% 250 mL IVPB (Vial-Mate)  1,500 mg Intravenous Q12H Theresa Alvarez MD         Family History    None       Tobacco Use    Smoking status: Not on file    Smokeless  tobacco: Not on file   Substance and Sexual Activity    Alcohol use: Not on file    Drug use: Not on file    Sexual activity: Not on file     Review of Systems   Constitutional:  Negative for appetite change, chills, diaphoresis, fatigue and fever.   HENT:  Negative for congestion.    Respiratory:  Positive for cough. Negative for shortness of breath and wheezing.    Cardiovascular:  Negative for chest pain, palpitations and leg swelling.   Gastrointestinal:  Positive for abdominal distention and blood in stool. Negative for abdominal pain, constipation, diarrhea, nausea and vomiting.   Genitourinary:  Positive for dysuria and frequency. Negative for flank pain.   Musculoskeletal:  Negative for arthralgias, joint swelling and myalgias.   Skin:  Positive for rash. Negative for pallor.   Neurological:  Negative for dizziness, light-headedness and headaches.   Psychiatric/Behavioral:  Negative for agitation. The patient is nervous/anxious.      Objective:     Vital Signs (Most Recent):  Temp: 98.5 °F (36.9 °C) (04/20/24 1322)  Pulse: (!) 114 (04/20/24 1322)  Resp: (!) 21 (04/20/24 1322)  BP: 111/63 (04/20/24 1322)  SpO2: (!) 92 % (04/20/24 1322) Vital Signs (24h Range):  Temp:  [98.4 °F (36.9 °C)-98.5 °F (36.9 °C)] 98.5 °F (36.9 °C)  Pulse:  [114-124] 114  Resp:  [16-23] 21  SpO2:  [92 %-96 %] 92 %  BP: ()/(50-63) 111/63     Weight: 71.8 kg (158 lb 4.6 oz)  Body mass index is 25.55 kg/m².     Physical Exam  Constitutional:       General: She is not in acute distress.  HENT:      Head: Normocephalic and atraumatic.   Eyes:      General: Scleral icterus present.      Extraocular Movements: Extraocular movements intact.      Pupils: Pupils are equal, round, and reactive to light.   Cardiovascular:      Rate and Rhythm: Normal rate and regular rhythm.      Heart sounds: No murmur heard.  Pulmonary:      Effort: Pulmonary effort is normal. No respiratory distress.      Breath sounds: No wheezing or rales.   Abdominal:       General: Abdomen is flat. Bowel sounds are normal. There is distension.      Palpations: Abdomen is soft.      Tenderness: There is no abdominal tenderness. There is right CVA tenderness.   Musculoskeletal:         General: No swelling or tenderness. Normal range of motion.      Cervical back: Normal range of motion.      Right lower leg: No edema.      Left lower leg: No edema.   Lymphadenopathy:      Cervical: No cervical adenopathy.   Skin:     General: Skin is warm and dry.      Coloration: Skin is jaundiced.      Findings: Bruising present.      Comments: Spider angioma in the chest    Neurological:      General: No focal deficit present.      Mental Status: She is alert and oriented to person, place, and time.      Comments: [employer]              CRANIAL NERVES     CN III, IV, VI   Pupils are equal, round, and reactive to light.       Significant Labs: All pertinent labs within the past 24 hours have been reviewed.    Significant Imaging: I have reviewed all pertinent imaging results/findings within the past 24 hours.

## 2024-04-20 NOTE — ASSESSMENT & PLAN NOTE
Pt with hx of alcohol use disorder and recently diagnosed alcohol liver cirrhosis. Lab work remarkable for elevated INR and Plts 42, PT 25.8 INR 2.5 Fibrinogen 105.    Plan  - Transfuse Cryoprecipitate   - Transfuse Platelets   - F/U CBC  - F/U PT/INR  - Might consider vit K

## 2024-04-20 NOTE — H&P
[facility] - Telemetry [facility] Medicine  History & Physical    Patient Name: Carola Tovar  MRN: [id number]  Patient Class: IP- Inpatient  Admission Date: 4/20/2024  Attending Physician: Theresa Alvarez MD   Primary Care Provider: [facility]         Patient information was obtained from patient and ER records.     Subjective:     Principal Problem:Liver failure without hepatic coma    Chief Complaint:   Chief Complaint   Patient presents with    Jaundice               HPI: Ms. Tovar is a 37 yo F with alcohol abuse disorder who presented to OSH ED with jaundice. Pt is originally from [place], but have spent the last 1.5 months in [place] visiting a friend. For the past month she has started noticing yellow discoloration in her eyes and skin. Pt reports that she used to drink 48 oz of vodka daily, but for the past week she has taper down to only 16 oz per day. Last drink was yesterday 04/19. She also reports multiple episodes of withdrawal in the past, but no seizure episodes. She also report dysuria and increased urinary frequency. Denies headaches, visual changes, SOB, cough, chest pain, palpitation, nausea, vomiting, abdominal pain, diarrhea, constipation.     At the OSH ED the patient was afebrile tachycardic to 120 with /42. Lab work remarkable for Hb/Hct 7.4/21.0, INR 2.57, Na 120, K 2.5, Mg 0.7. Tbili of 24. U/A nitrite positive. Given IV CTX, Mg and K replacements. Hep studies negative. CT A/P shows cirrhosis and portal HTN, gastric diffuse bowel wall thickening and jose-pancreatic fat stranding. Pt was transferred to [facility] for higher level of care. Pt admitted to hospital medicine for further management of acute on chronic liver failure and hepatology was consulted.     No past medical history on file.    No past surgical history on file.    Review of patient's allergies indicates:   Allergen Reactions    Sulfa (sulfonamide antibiotics) Hives       Current Facility-Administered  Medications   Medication Dose Route Frequency Provider Last Rate Last Admin    [START ON 4/21/2024] cefTRIAXone (ROCEPHIN) 2 g in dextrose 5 % in water (D5W) 100 mL IVPB (MB+)  2 g Intravenous Q24H Antonio Colindres DO        dextrose 10% bolus 125 mL 125 mL  12.5 g Intravenous PRN Kobi Villarreal MD        dextrose 10% bolus 250 mL 250 mL  25 g Intravenous PRN Kobi Villarreal MD        folic acid tablet 1 mg  1 mg Oral Daily Kobi Villarreal MD        glucagon (human recombinant) injection 1 mg  1 mg Intramuscular PRN Kobi Villarreal MD        glucose chewable tablet 16 g  16 g Oral PRN Kobi Villarreal MD        glucose chewable tablet 24 g  24 g Oral PRN Kobi Villarreal MD        LORazepam tablet 2 mg  2 mg Oral Q4H PRN Antonio Colindres DO        multivitamin tablet  1 tablet Oral Daily Kobi Villarreal MD        mupirocin 2 % ointment   Nasal BID Theresa Alvarez MD        naloxone 0.4 mg/mL injection 0.02 mg  0.02 mg Intravenous PRN Kobi Villarreal MD        sodium chloride 0.9% flush 10 mL  10 mL Intravenous Q12H PRN Kobi Villarreal MD        thiamine tablet 100 mg  100 mg Oral Daily Kobi Villarreal MD        vancomycin - pharmacy to dose   Intravenous pharmacy to manage frequency Antonio Colindres DO        [START ON 4/21/2024] vancomycin 1,250 mg in sodium chloride 0.9% 250 mL IVPB (Vial-Mate)  1,250 mg Intravenous Q12H Theresa Alvarez MD        vancomycin 1,500 mg in sodium chloride 0.9% 250 mL IVPB (Vial-Mate)  1,500 mg Intravenous Q12H Theresa Alvarez MD         Family History    None       Tobacco Use    Smoking status: Not on file    Smokeless tobacco: Not on file   Substance and Sexual Activity    Alcohol use: Not on file    Drug use: Not on file    Sexual activity: Not on file     Review of Systems   Constitutional:  Negative for appetite change,  chills, diaphoresis, fatigue and fever.   HENT:  Negative for congestion.    Respiratory:  Positive for cough. Negative for shortness of breath and wheezing.    Cardiovascular:  Negative for chest pain, palpitations and leg swelling.   Gastrointestinal:  Positive for abdominal distention and blood in stool. Negative for abdominal pain, constipation, diarrhea, nausea and vomiting.   Genitourinary:  Positive for dysuria and frequency. Negative for flank pain.   Musculoskeletal:  Negative for arthralgias, joint swelling and myalgias.   Skin:  Positive for rash. Negative for pallor.   Neurological:  Negative for dizziness, light-headedness and headaches.   Psychiatric/Behavioral:  Negative for agitation. The patient is nervous/anxious.      Objective:     Vital Signs (Most Recent):  Temp: 98.5 °F (36.9 °C) (04/20/24 1322)  Pulse: (!) 114 (04/20/24 1322)  Resp: (!) 21 (04/20/24 1322)  BP: 111/63 (04/20/24 1322)  SpO2: (!) 92 % (04/20/24 1322) Vital Signs (24h Range):  Temp:  [98.4 °F (36.9 °C)-98.5 °F (36.9 °C)] 98.5 °F (36.9 °C)  Pulse:  [114-124] 114  Resp:  [16-23] 21  SpO2:  [92 %-96 %] 92 %  BP: ()/(50-63) 111/63     Weight: 71.8 kg (158 lb 4.6 oz)  Body mass index is 25.55 kg/m².     Physical Exam  Constitutional:       General: She is not in acute distress.  HENT:      Head: Normocephalic and atraumatic.   Eyes:      General: Scleral icterus present.      Extraocular Movements: Extraocular movements intact.      Pupils: Pupils are equal, round, and reactive to light.   Cardiovascular:      Rate and Rhythm: Normal rate and regular rhythm.      Heart sounds: No murmur heard.  Pulmonary:      Effort: Pulmonary effort is normal. No respiratory distress.      Breath sounds: No wheezing or rales.   Abdominal:      General: Abdomen is flat. Bowel sounds are normal. There is distension.      Palpations: Abdomen is soft.      Tenderness: There is no abdominal tenderness. There is right CVA tenderness.    Musculoskeletal:         General: No swelling or tenderness. Normal range of motion.      Cervical back: Normal range of motion.      Right lower leg: No edema.      Left lower leg: No edema.   Lymphadenopathy:      Cervical: No cervical adenopathy.   Skin:     General: Skin is warm and dry.      Coloration: Skin is jaundiced.      Findings: Bruising present.      Comments: Spider angioma in the chest    Neurological:      General: No focal deficit present.      Mental Status: She is alert and oriented to person, place, and time.      Comments: Asterixis              CRANIAL NERVES     CN III, IV, VI   Pupils are equal, round, and reactive to light.       Significant Labs: All pertinent labs within the past 24 hours have been reviewed.    Significant Imaging: I have reviewed all pertinent imaging results/findings within the past 24 hours.  Assessment/Plan:     * Liver failure without hepatic coma  Pt with a hx of alcohol use disorder presenting from OSH for complains of jaundice. She drinks 48 oz of vodka daily, however has been trying to taper down. Has a history of alcohol withdrawal in the past, but no seizures. As reported per transfer note lab work at OSH was remarkable for  Hb/Hct 7.4/21.0, INR 2.57, Na 120, K 2.5, Mg 0.7. Tbili of 24. . Hep studies negative. CT A/P shows cirrhosis and portal HTN, gastric diffuse bowel wall thickening and jose-pancreatic fat stranding. Physical exam remarkable for sclera icterus, jaundice, asterixis and abdominal distention. US abdomen did not showed a viable pocket for paracentesis.     Plan  - Consult hepatology, appreciate recs  - Continue rocephin and vanc   - Liver ultrasound with doppler  - F/U CBC  - F/U CMP  - PT/INR  - Ammonia  - Hep panel   - Antimitochrodial antibody  - Anti smooth muscle antibody  - Alpha 1 antitrypsin  - Lactic acid       UGIB (upper gastrointestinal bleed)  Pt presenting to [facility] for acute on chronic liver failure. Has a history of alcohol use  disorder. Physical exam remarkable for right CVA tenderness, jaundice, sclera icterus. Reports having melena on her stool. Hgb 6.9 and plts 42.     Plan  - GI consulted, appreciate recs  - CTA abdomen, pending results   - Trend Hgb q6hrs. Transfuse for Hgb <7, unless otherwise indicated  - Maintain IV access with 2 large bore Ivs  - Intravascular resuscitation/support with IVFs   - NPO  - Hold all NSAIDs and anticoagulants, unless contraindicated  - Bolus IV pantoprazole 80mg followed by 40mg BID  - Please correct any coagulopathy with platelets and FFP for goal of platelets >50K and INR <2.0  - Please notify GI team if there is significant change in patient's clinical status      Acute cystitis  Pt presenting from OSH for concerns of liver failure. Pt reports dysuria and urinary frequency. UA at OSH remarkable for nitrite positive. Given IV CTX.    Plan  - UA and urine culture  - continue rocephin       Acute blood loss anemia  Patient's anemia is currently uncontrolled. Has not received any PRBCs to date. Etiology likely d/t chronic disease due to Chronic liver disease  Current CBC reviewed-   Lab Results   Component Value Date    HGB 6.9 (L) 04/20/2024    HCT 19.1 (LL) 04/20/2024     - Type and screen   - Transfusing one unit of blood  - Monitor serial CBC and transfuse if patient becomes hemodynamically unstable, symptomatic or H/H drops below 7/21.      Alcoholic cirrhosis  Patient with known Cirrhosis with Child's class C. Co-morbidities are present and inclusive of portal hypertension and anemia/pancytopenia.  MELD-Na score calculated; Computed MELD 3.0 unavailable. One or more values for this score either were not found within the given timeframe or did not fit some other criterion.  Computed MELD-Na unavailable. One or more values for this score either were not found within the given timeframe or did not fit some other criterion.      Continue chronic meds. Etiology likely ETOH. Will avoid any hepatotoxic  "meds, and monitor CBC/CMP/INR for synthetic function.     For plan please see liver failure    Coagulopathy  Pt with hx of alcohol use disorder and recently diagnosed alcohol liver cirrhosis. Lab work remarkable for elevated INR and Plts 42, PT 25.8 INR 2.5 Fibrinogen 105.    Plan  - Transfuse Cryoprecipitate   - Transfuse Platelets   - F/U CBC  - F/U PT/INR  - Might consider vit K    Hypomagnesemia  Patient has Abnormal Magnesium: hypomagnesemia. Will continue to monitor electrolytes closely. Will replace the affected electrolytes and repeat labs to be done after interventions completed. The patient's magnesium results have been reviewed and are listed below.  No results for input(s): "MG" in the last 24 hours.     Hyponatremia  Patient has hyponatremia which is uncontrolled,We will aim to correct the sodium by 4-6mEq in 24 hours. We will monitor sodium Daily. The hyponatremia is due to Cirrhosis. We will obtain the following studies: Urine sodium, urine osmolality, serum osmolality. We will treat the hyponatremia with Fluid restriction of:  1.5 liter per day. The patient's sodium results have been reviewed and are listed below.  No results for input(s): "NA" in the last 24 hours.    Alcohol use disorder  Pt with hx of alcohol use disorder presenting to [facility] with acute on chronic liver failure. Reports last drink was on 04/19/24. Also has a hx of alcohol withdrawal with no seizure activity.     Alcohol Withdrawal precautions:  - Vitals q4h while awake  - CIWA monitoring  - Lorazepam PRN if 2 criteria are met: Systolic BP>160, Diastolic BP >110 or Pulse >110  - Start Vitamin supplementation- Thiamine, Folic acid, Vit. B12, and Multivitamin qd        VTE Risk Mitigation (From admission, onward)           Ordered     IP VTE LOW RISK PATIENT  Once         04/20/24 1347     Place sequential compression device  Until discontinued         04/20/24 1347                                    Kobi Rodriguez" MD  Department of Hospital Medicine  [facility] - Telemetry Stepdown

## 2024-04-20 NOTE — NURSING
Attempted to release blood, but was notified by blood bank that the pt would need another type and screen. Notified Michael TUCKER that pt would need a repeat type and screen. Michael TUCKER ordered another type and screen. Pt remains AAOx4 and is currently in bed. No c/o pain or nausea noted at this time. Pt in bed with the call light in reach and the bed alarm on.

## 2024-04-20 NOTE — PROVIDER TRANSFER
Outside Transfer Acceptance Note / Regional Referral Center    Referring facility: [facility]   Referring provider: ROGERIO HAIR  Accepting facility: [facility]  Accepting provider: JASSI SMITH  Admitting provider: Dr. Jassi Smith  Reason for transfer:  HLOC  Transfer diagnosis: Decompensated Hepatic Cirrhosis  Transfer specialty requested: Hepatology  Transfer specialty notified: No  Transfer level: NUMBER 1-5: 2  Bed type requested: stepdown  Isolation status: No active isolations   Admission class or status: IP- Inpatient      Narrative   38 year old female with PMHx of alcohol abuse who is initially from [place] and currently visiting a friend for the past 1.5 months who presents to the ED with jaundice. Patient found have labs notable for: Hb/Hct 7.4/21.0, INR 2.57, Na 120, K 2.5, Mg 0.7. Tbili of 24. U/A nitrite positive. Given IV CTX, Mg and K replacements. Hep studies negative. CT A/P shows cirrhosis and portal HTN, gastric diffuse bowel wall thickening and jose-pancreatic fat stranding. Last drink today, has been trying to cut down the past two weeks. Alcohol level 46. No s/s of withdrawal. Most recent vitals: /42  RR 16 afebrile on room air. Got 1mg of ativan. Lactic acid, lipase, ammonia pending however she is not confused. Transfer request for hepatology/liver transplant evaluation due to high MELD score.    Objective     Vitals:    Recent Labs: All pertinent labs within the past 24 hours have been reviewed.  Recent imaging: as above   Airway:     Vent settings:         IV access:    Infusions: none  Allergies: Review of patient's allergies indicates:  Not on File   NPO: No    Anticoagulation:   Anticoagulants       None             Instructions      Timmy y-  Admit to Hospital Medicine  Upon patient arrival to floor, please send SecureChat to [facility] or call extension [phone] (if no answer, do NOT leave a callback number after the beep,  rather please send a SecureChat to [facility]), for Hospital Medicine admit team assignment and for additional admit orders for the patient.  Do not page the attending physician associated with the patient on arrival (this physician may not be on duty at the time of arrival).  Rather, always send a SecureChat to [facility] or call [phone] to reach the triage physician for orders and team assignment.    Consult liver transplant

## 2024-04-20 NOTE — ASSESSMENT & PLAN NOTE
Pt with hx of alcohol use disorder presenting to [facility] with acute on chronic liver failure. Reports last drink was on 04/19/24. Also has a hx of alcohol withdrawal with no seizure activity.     Alcohol Withdrawal precautions:  - Vitals q4h while awake  - CIWA monitoring  - Lorazepam PRN if 2 criteria are met: Systolic BP>160, Diastolic BP >110 or Pulse >110  - Start Vitamin supplementation- Thiamine, Folic acid, Vit. B12, and Multivitamin qd

## 2024-04-20 NOTE — ASSESSMENT & PLAN NOTE
Patient's anemia is currently uncontrolled. Has not received any PRBCs to date. Etiology likely d/t chronic disease due to Chronic liver disease  Current CBC reviewed-   Lab Results   Component Value Date    HGB 6.9 (L) 04/20/2024    HCT 19.1 (LL) 04/20/2024     - Type and screen   - Transfusing one unit of blood  - Monitor serial CBC and transfuse if patient becomes hemodynamically unstable, symptomatic or H/H drops below 7/21.

## 2024-04-20 NOTE — ASSESSMENT & PLAN NOTE
Pt with a hx of alcohol use disorder presenting from OSH for complains of jaundice. She drinks 48 oz of vodka daily, however has been trying to taper down. Has a history of alcohol withdrawal in the past, but no seizures. As reported per transfer note lab work at OSH was remarkable for  Hb/Hct 7.4/21.0, INR 2.57, Na 120, K 2.5, Mg 0.7. Tbili of 24. . Hep studies negative. CT A/P shows cirrhosis and portal HTN, gastric diffuse bowel wall thickening and jose-pancreatic fat stranding. Physical exam remarkable for sclera icterus, jaundice, asterixis and abdominal distention. US abdomen did not showed a viable pocket for paracentesis.     Plan  - Consult hepatology, appreciate recs  - Continue rocephin and vanc   - Liver ultrasound with doppler  - F/U CBC  - F/U CMP  - PT/INR  - Ammonia  - Hep panel   - Antimitochrodial antibody  - Anti smooth muscle antibody  - Alpha 1 antitrypsin  - Lactic acid

## 2024-04-20 NOTE — ASSESSMENT & PLAN NOTE
Pt presenting to [facility] for acute on chronic liver failure. Has a history of alcohol use disorder. Physical exam remarkable for right CVA tenderness, jaundice, sclera icterus. Reports having melena on her stool. Hgb 6.9 and plts 42.     Plan  - GI consulted, appreciate recs  - CTA abdomen, pending results   - Trend Hgb q6hrs. Transfuse for Hgb <7, unless otherwise indicated  - Maintain IV access with 2 large bore Ivs  - Intravascular resuscitation/support with IVFs   - NPO  - Hold all NSAIDs and anticoagulants, unless contraindicated  - Bolus IV pantoprazole 80mg followed by 40mg BID  - Please correct any coagulopathy with platelets and FFP for goal of platelets >50K and INR <2.0  - Please notify GI team if there is significant change in patient's clinical status

## 2024-04-20 NOTE — ASSESSMENT & PLAN NOTE
Pt presenting from OSH for concerns of liver failure. Pt reports dysuria and urinary frequency. UA at OSH remarkable for nitrite positive. Given IV CTX.    Plan  - UA and urine culture  - continue rocephin

## 2024-04-20 NOTE — ASSESSMENT & PLAN NOTE
Patient with known Cirrhosis with Child's class C. Co-morbidities are present and inclusive of portal hypertension and anemia/pancytopenia.  MELD-Na score calculated; Computed MELD 3.0 unavailable. One or more values for this score either were not found within the given timeframe or did not fit some other criterion.  Computed MELD-Na unavailable. One or more values for this score either were not found within the given timeframe or did not fit some other criterion.      Continue chronic meds. Etiology likely ETOH. Will avoid any hepatotoxic meds, and monitor CBC/CMP/INR for synthetic function.     For plan please see liver failure

## 2024-04-21 LAB
ALBUMIN SERPL BCP-MCNC: 2.3 G/DL (ref 3.5–5.2)
ALP SERPL-CCNC: 140 U/L (ref 55–135)
ALP SERPL-CCNC: 143 U/L (ref 55–135)
ALP SERPL-CCNC: 151 U/L (ref 55–135)
ALT SERPL W/O P-5'-P-CCNC: 37 U/L (ref 10–44)
ALT SERPL W/O P-5'-P-CCNC: 41 U/L (ref 10–44)
ALT SERPL W/O P-5'-P-CCNC: 42 U/L (ref 10–44)
ANION GAP SERPL CALC-SCNC: 11 MMOL/L (ref 8–16)
ANION GAP SERPL CALC-SCNC: 12 MMOL/L (ref 8–16)
ANION GAP SERPL CALC-SCNC: 13 MMOL/L (ref 8–16)
ANION GAP SERPL CALC-SCNC: 15 MMOL/L (ref 8–16)
ANISOCYTOSIS BLD QL SMEAR: SLIGHT
ANISOCYTOSIS BLD QL SMEAR: SLIGHT
AST SERPL-CCNC: 107 U/L (ref 10–40)
AST SERPL-CCNC: 112 U/L (ref 10–40)
AST SERPL-CCNC: 114 U/L (ref 10–40)
BACTERIA #/AREA URNS AUTO: ABNORMAL /HPF
BASOPHILS # BLD AUTO: 0.03 K/UL (ref 0–0.2)
BASOPHILS # BLD AUTO: 0.05 K/UL (ref 0–0.2)
BASOPHILS # BLD AUTO: 0.05 K/UL (ref 0–0.2)
BASOPHILS NFR BLD: 0.3 % (ref 0–1.9)
BASOPHILS NFR BLD: 0.5 % (ref 0–1.9)
BASOPHILS NFR BLD: 0.5 % (ref 0–1.9)
BILIRUB SERPL-MCNC: 22.1 MG/DL (ref 0.1–1)
BILIRUB SERPL-MCNC: 23.1 MG/DL (ref 0.1–1)
BILIRUB SERPL-MCNC: 23.9 MG/DL (ref 0.1–1)
BILIRUB UR QL STRIP: ABNORMAL
BLD PROD TYP BPU: NORMAL
BLD PROD TYP BPU: NORMAL
BLOOD UNIT EXPIRATION DATE: NORMAL
BLOOD UNIT EXPIRATION DATE: NORMAL
BLOOD UNIT TYPE CODE: 1700
BLOOD UNIT TYPE CODE: 7300
BLOOD UNIT TYPE: NORMAL
BLOOD UNIT TYPE: NORMAL
BUN SERPL-MCNC: 13 MG/DL (ref 6–20)
BUN SERPL-MCNC: 15 MG/DL (ref 6–20)
BUN SERPL-MCNC: 16 MG/DL (ref 6–20)
BUN SERPL-MCNC: 16 MG/DL (ref 6–20)
BURR CELLS BLD QL SMEAR: ABNORMAL
CALCIUM SERPL-MCNC: 6.5 MG/DL (ref 8.7–10.5)
CALCIUM SERPL-MCNC: 7 MG/DL (ref 8.7–10.5)
CALCIUM SERPL-MCNC: 7 MG/DL (ref 8.7–10.5)
CALCIUM SERPL-MCNC: 7.2 MG/DL (ref 8.7–10.5)
CHLORIDE SERPL-SCNC: 79 MMOL/L (ref 95–110)
CHLORIDE SERPL-SCNC: 81 MMOL/L (ref 95–110)
CHLORIDE SERPL-SCNC: 82 MMOL/L (ref 95–110)
CHLORIDE SERPL-SCNC: 83 MMOL/L (ref 95–110)
CLARITY UR REFRACT.AUTO: ABNORMAL
CO2 SERPL-SCNC: 24 MMOL/L (ref 23–29)
CO2 SERPL-SCNC: 26 MMOL/L (ref 23–29)
CO2 SERPL-SCNC: 26 MMOL/L (ref 23–29)
CO2 SERPL-SCNC: 27 MMOL/L (ref 23–29)
CODING SYSTEM: NORMAL
CODING SYSTEM: NORMAL
COLOR UR AUTO: YELLOW
CREAT SERPL-MCNC: 0.8 MG/DL (ref 0.5–1.4)
CREAT SERPL-MCNC: 1.1 MG/DL (ref 0.5–1.4)
CREAT SERPL-MCNC: 1.2 MG/DL (ref 0.5–1.4)
CREAT SERPL-MCNC: 1.3 MG/DL (ref 0.5–1.4)
CROSSMATCH INTERPRETATION: NORMAL
CROSSMATCH INTERPRETATION: NORMAL
DIFFERENTIAL METHOD BLD: ABNORMAL
DISPENSE STATUS: NORMAL
DISPENSE STATUS: NORMAL
EOSINOPHIL # BLD AUTO: 0.1 K/UL (ref 0–0.5)
EOSINOPHIL # BLD AUTO: 0.2 K/UL (ref 0–0.5)
EOSINOPHIL # BLD AUTO: 0.2 K/UL (ref 0–0.5)
EOSINOPHIL NFR BLD: 1.2 % (ref 0–8)
EOSINOPHIL NFR BLD: 1.7 % (ref 0–8)
EOSINOPHIL NFR BLD: 1.7 % (ref 0–8)
ERYTHROCYTE [DISTWIDTH] IN BLOOD BY AUTOMATED COUNT: ABNORMAL % (ref 11.5–14.5)
EST. GFR  (NO RACE VARIABLE): 53.3 ML/MIN/1.73 M^2
EST. GFR  (NO RACE VARIABLE): 58.7 ML/MIN/1.73 M^2
EST. GFR  (NO RACE VARIABLE): >60 ML/MIN/1.73 M^2
EST. GFR  (NO RACE VARIABLE): >60 ML/MIN/1.73 M^2
FIBRINOGEN PPP-MCNC: 152 MG/DL (ref 182–400)
GLUCOSE SERPL-MCNC: 111 MG/DL (ref 70–110)
GLUCOSE SERPL-MCNC: 114 MG/DL (ref 70–110)
GLUCOSE SERPL-MCNC: 123 MG/DL (ref 70–110)
GLUCOSE SERPL-MCNC: 127 MG/DL (ref 70–110)
GLUCOSE UR QL STRIP: NEGATIVE
HCT VFR BLD AUTO: 22.7 % (ref 37–48.5)
HCT VFR BLD AUTO: 23.2 % (ref 37–48.5)
HCT VFR BLD AUTO: 23.9 % (ref 37–48.5)
HGB BLD-MCNC: 8.3 G/DL (ref 12–16)
HGB BLD-MCNC: 8.5 G/DL (ref 12–16)
HGB BLD-MCNC: 8.6 G/DL (ref 12–16)
HGB UR QL STRIP: ABNORMAL
HYALINE CASTS UR QL AUTO: 0 /LPF
HYPOCHROMIA BLD QL SMEAR: ABNORMAL
HYPOCHROMIA BLD QL SMEAR: ABNORMAL
IMM GRANULOCYTES # BLD AUTO: 0.11 K/UL (ref 0–0.04)
IMM GRANULOCYTES # BLD AUTO: 0.13 K/UL (ref 0–0.04)
IMM GRANULOCYTES # BLD AUTO: 0.14 K/UL (ref 0–0.04)
IMM GRANULOCYTES NFR BLD AUTO: 1.1 % (ref 0–0.5)
IMM GRANULOCYTES NFR BLD AUTO: 1.2 % (ref 0–0.5)
IMM GRANULOCYTES NFR BLD AUTO: 1.2 % (ref 0–0.5)
INR PPP: 2.3 (ref 0.8–1.2)
KETONES UR QL STRIP: NEGATIVE
LACTATE SERPL-SCNC: 1.9 MMOL/L (ref 0.5–2.2)
LACTATE SERPL-SCNC: 2.6 MMOL/L (ref 0.5–2.2)
LEUKOCYTE ESTERASE UR QL STRIP: ABNORMAL
LYMPHOCYTES # BLD AUTO: 0.9 K/UL (ref 1–4.8)
LYMPHOCYTES # BLD AUTO: 1 K/UL (ref 1–4.8)
LYMPHOCYTES # BLD AUTO: 1 K/UL (ref 1–4.8)
LYMPHOCYTES NFR BLD: 8.4 % (ref 18–48)
LYMPHOCYTES NFR BLD: 8.5 % (ref 18–48)
LYMPHOCYTES NFR BLD: 9.8 % (ref 18–48)
MAGNESIUM SERPL-MCNC: 2.2 MG/DL (ref 1.6–2.6)
MAGNESIUM SERPL-MCNC: 2.2 MG/DL (ref 1.6–2.6)
MCH RBC QN AUTO: 39.1 PG (ref 27–31)
MCH RBC QN AUTO: 39.2 PG (ref 27–31)
MCH RBC QN AUTO: 39.9 PG (ref 27–31)
MCHC RBC AUTO-ENTMCNC: 35.8 G/DL (ref 32–36)
MCHC RBC AUTO-ENTMCNC: 36 G/DL (ref 32–36)
MCHC RBC AUTO-ENTMCNC: 37.4 G/DL (ref 32–36)
MCV RBC AUTO: 107 FL (ref 82–98)
MCV RBC AUTO: 109 FL (ref 82–98)
MCV RBC AUTO: 109 FL (ref 82–98)
MICROSCOPIC COMMENT: ABNORMAL
MONOCYTES # BLD AUTO: 1.5 K/UL (ref 0.3–1)
MONOCYTES # BLD AUTO: 1.8 K/UL (ref 0.3–1)
MONOCYTES # BLD AUTO: 1.9 K/UL (ref 0.3–1)
MONOCYTES NFR BLD: 15.2 % (ref 4–15)
MONOCYTES NFR BLD: 16.8 % (ref 4–15)
MONOCYTES NFR BLD: 17.2 % (ref 4–15)
NEUTROPHILS # BLD AUTO: 7.1 K/UL (ref 1.8–7.7)
NEUTROPHILS # BLD AUTO: 7.6 K/UL (ref 1.8–7.7)
NEUTROPHILS # BLD AUTO: 8.3 K/UL (ref 1.8–7.7)
NEUTROPHILS NFR BLD: 71 % (ref 38–73)
NEUTROPHILS NFR BLD: 71.7 % (ref 38–73)
NEUTROPHILS NFR BLD: 72 % (ref 38–73)
NITRITE UR QL STRIP: NEGATIVE
NON-SQ EPI CELLS #/AREA URNS AUTO: 5 /HPF
NRBC BLD-RTO: 1 /100 WBC
NUM UNITS TRANS PACKED RBC: NORMAL
OHS QRS DURATION: 108 MS
OHS QTC CALCULATION: 514 MS
OSMOLALITY SERPL: 262 MOSM/KG (ref 275–295)
OSMOLALITY UR: 273 MOSM/KG (ref 50–1200)
OVALOCYTES BLD QL SMEAR: ABNORMAL
PH UR STRIP: 6 [PH] (ref 5–8)
PHOSPHATE SERPL-MCNC: 2.3 MG/DL (ref 2.7–4.5)
PHOSPHATE SERPL-MCNC: 2.5 MG/DL (ref 2.7–4.5)
PLATELET # BLD AUTO: 53 K/UL (ref 150–450)
PLATELET # BLD AUTO: 53 K/UL (ref 150–450)
PLATELET # BLD AUTO: 54 K/UL (ref 150–450)
PLATELET BLD QL SMEAR: ABNORMAL
PMV BLD AUTO: 10.4 FL (ref 9.2–12.9)
PMV BLD AUTO: 10.5 FL (ref 9.2–12.9)
PMV BLD AUTO: 10.8 FL (ref 9.2–12.9)
POIKILOCYTOSIS BLD QL SMEAR: SLIGHT
POIKILOCYTOSIS BLD QL SMEAR: SLIGHT
POLYCHROMASIA BLD QL SMEAR: ABNORMAL
POLYCHROMASIA BLD QL SMEAR: ABNORMAL
POTASSIUM SERPL-SCNC: 3.2 MMOL/L (ref 3.5–5.1)
POTASSIUM SERPL-SCNC: 3.3 MMOL/L (ref 3.5–5.1)
POTASSIUM SERPL-SCNC: 3.6 MMOL/L (ref 3.5–5.1)
POTASSIUM SERPL-SCNC: 3.8 MMOL/L (ref 3.5–5.1)
PROT SERPL-MCNC: 5.1 G/DL (ref 6–8.4)
PROT SERPL-MCNC: 5.2 G/DL (ref 6–8.4)
PROT SERPL-MCNC: 5.3 G/DL (ref 6–8.4)
PROT UR QL STRIP: ABNORMAL
PROTHROMBIN TIME: 24.2 SEC (ref 9–12.5)
RBC # BLD AUTO: 2.12 M/UL (ref 4–5.4)
RBC # BLD AUTO: 2.13 M/UL (ref 4–5.4)
RBC # BLD AUTO: 2.2 M/UL (ref 4–5.4)
RBC #/AREA URNS AUTO: >100 /HPF (ref 0–4)
SCHISTOCYTES BLD QL SMEAR: ABNORMAL
SODIUM SERPL-SCNC: 118 MMOL/L (ref 136–145)
SODIUM SERPL-SCNC: 120 MMOL/L (ref 136–145)
SODIUM SERPL-SCNC: 120 MMOL/L (ref 136–145)
SODIUM SERPL-SCNC: 121 MMOL/L (ref 136–145)
SODIUM UR-SCNC: <10 MMOL/L (ref 20–250)
SP GR UR STRIP: >1.03 (ref 1–1.03)
SPHEROCYTES BLD QL SMEAR: ABNORMAL
SPHEROCYTES BLD QL SMEAR: ABNORMAL
SQUAMOUS #/AREA URNS AUTO: 15 /HPF
TARGETS BLD QL SMEAR: ABNORMAL
TROPONIN I SERPL DL<=0.01 NG/ML-MCNC: 0.02 NG/ML (ref 0–0.03)
UNIT NUMBER: NORMAL
URN SPEC COLLECT METH UR: ABNORMAL
WBC # BLD AUTO: 10.71 K/UL (ref 3.9–12.7)
WBC # BLD AUTO: 11.54 K/UL (ref 3.9–12.7)
WBC # BLD AUTO: 9.95 K/UL (ref 3.9–12.7)
WBC #/AREA URNS AUTO: 78 /HPF (ref 0–5)

## 2024-04-21 PROCEDURE — 83735 ASSAY OF MAGNESIUM: CPT

## 2024-04-21 PROCEDURE — 63600175 PHARM REV CODE 636 W HCPCS

## 2024-04-21 PROCEDURE — 87088 URINE BACTERIA CULTURE: CPT | Performed by: STUDENT IN AN ORGANIZED HEALTH CARE EDUCATION/TRAINING PROGRAM

## 2024-04-21 PROCEDURE — 87086 URINE CULTURE/COLONY COUNT: CPT | Performed by: STUDENT IN AN ORGANIZED HEALTH CARE EDUCATION/TRAINING PROGRAM

## 2024-04-21 PROCEDURE — 99222 1ST HOSP IP/OBS MODERATE 55: CPT | Mod: ,,, | Performed by: INTERNAL MEDICINE

## 2024-04-21 PROCEDURE — C9113 INJ PANTOPRAZOLE SODIUM, VIA: HCPCS

## 2024-04-21 PROCEDURE — 84100 ASSAY OF PHOSPHORUS: CPT

## 2024-04-21 PROCEDURE — 84484 ASSAY OF TROPONIN QUANT: CPT

## 2024-04-21 PROCEDURE — 83735 ASSAY OF MAGNESIUM: CPT | Mod: 91 | Performed by: STUDENT IN AN ORGANIZED HEALTH CARE EDUCATION/TRAINING PROGRAM

## 2024-04-21 PROCEDURE — 87106 FUNGI IDENTIFICATION YEAST: CPT | Performed by: STUDENT IN AN ORGANIZED HEALTH CARE EDUCATION/TRAINING PROGRAM

## 2024-04-21 PROCEDURE — 36415 COLL VENOUS BLD VENIPUNCTURE: CPT

## 2024-04-21 PROCEDURE — 36415 COLL VENOUS BLD VENIPUNCTURE: CPT | Mod: XB | Performed by: STUDENT IN AN ORGANIZED HEALTH CARE EDUCATION/TRAINING PROGRAM

## 2024-04-21 PROCEDURE — 25000003 PHARM REV CODE 250

## 2024-04-21 PROCEDURE — P9016 RBC LEUKOCYTES REDUCED: HCPCS

## 2024-04-21 PROCEDURE — 25000003 PHARM REV CODE 250: Performed by: STUDENT IN AN ORGANIZED HEALTH CARE EDUCATION/TRAINING PROGRAM

## 2024-04-21 PROCEDURE — 85025 COMPLETE CBC W/AUTO DIFF WBC: CPT | Performed by: STUDENT IN AN ORGANIZED HEALTH CARE EDUCATION/TRAINING PROGRAM

## 2024-04-21 PROCEDURE — 84100 ASSAY OF PHOSPHORUS: CPT | Mod: 91 | Performed by: STUDENT IN AN ORGANIZED HEALTH CARE EDUCATION/TRAINING PROGRAM

## 2024-04-21 PROCEDURE — 80053 COMPREHEN METABOLIC PANEL: CPT | Mod: 91

## 2024-04-21 PROCEDURE — 84300 ASSAY OF URINE SODIUM: CPT

## 2024-04-21 PROCEDURE — 63600175 PHARM REV CODE 636 W HCPCS: Mod: JZ,JG

## 2024-04-21 PROCEDURE — 81001 URINALYSIS AUTO W/SCOPE: CPT | Performed by: STUDENT IN AN ORGANIZED HEALTH CARE EDUCATION/TRAINING PROGRAM

## 2024-04-21 PROCEDURE — 99223 1ST HOSP IP/OBS HIGH 75: CPT | Mod: ,,, | Performed by: INTERNAL MEDICINE

## 2024-04-21 PROCEDURE — P9012 CRYOPRECIPITATE EACH UNIT: HCPCS

## 2024-04-21 PROCEDURE — 83605 ASSAY OF LACTIC ACID: CPT

## 2024-04-21 PROCEDURE — 25500020 PHARM REV CODE 255: Performed by: STUDENT IN AN ORGANIZED HEALTH CARE EDUCATION/TRAINING PROGRAM

## 2024-04-21 PROCEDURE — 83930 ASSAY OF BLOOD OSMOLALITY: CPT

## 2024-04-21 PROCEDURE — 83935 ASSAY OF URINE OSMOLALITY: CPT

## 2024-04-21 PROCEDURE — 85384 FIBRINOGEN ACTIVITY: CPT | Performed by: STUDENT IN AN ORGANIZED HEALTH CARE EDUCATION/TRAINING PROGRAM

## 2024-04-21 PROCEDURE — 36415 COLL VENOUS BLD VENIPUNCTURE: CPT | Mod: XB

## 2024-04-21 PROCEDURE — 63600175 PHARM REV CODE 636 W HCPCS: Performed by: STUDENT IN AN ORGANIZED HEALTH CARE EDUCATION/TRAINING PROGRAM

## 2024-04-21 PROCEDURE — 80048 BASIC METABOLIC PNL TOTAL CA: CPT | Mod: XB | Performed by: STUDENT IN AN ORGANIZED HEALTH CARE EDUCATION/TRAINING PROGRAM

## 2024-04-21 PROCEDURE — 86965 POOLING BLOOD PLATELETS: CPT

## 2024-04-21 PROCEDURE — 83605 ASSAY OF LACTIC ACID: CPT | Mod: 91 | Performed by: STUDENT IN AN ORGANIZED HEALTH CARE EDUCATION/TRAINING PROGRAM

## 2024-04-21 PROCEDURE — 20600001 HC STEP DOWN PRIVATE ROOM

## 2024-04-21 PROCEDURE — 85610 PROTHROMBIN TIME: CPT

## 2024-04-21 RX ORDER — LACTULOSE 10 G/15ML
15 SOLUTION ORAL 2 TIMES DAILY
Status: DISCONTINUED | OUTPATIENT
Start: 2024-04-21 | End: 2024-04-22

## 2024-04-21 RX ORDER — THIAMINE HCL 100 MG
100 TABLET ORAL DAILY
Status: DISCONTINUED | OUTPATIENT
Start: 2024-04-21 | End: 2024-05-02 | Stop reason: HOSPADM

## 2024-04-21 RX ORDER — POTASSIUM CHLORIDE 7.45 MG/ML
10 INJECTION INTRAVENOUS ONCE
Status: COMPLETED | OUTPATIENT
Start: 2024-04-21 | End: 2024-04-21

## 2024-04-21 RX ADMIN — OCTREOTIDE ACETATE 50 MCG/HR: 500 INJECTION, SOLUTION INTRAVENOUS; SUBCUTANEOUS at 08:04

## 2024-04-21 RX ADMIN — MUPIROCIN: 20 OINTMENT TOPICAL at 08:04

## 2024-04-21 RX ADMIN — PANTOPRAZOLE SODIUM 40 MG: 40 INJECTION, POWDER, FOR SOLUTION INTRAVENOUS at 09:04

## 2024-04-21 RX ADMIN — OCTREOTIDE ACETATE 50 MCG/HR: 500 INJECTION, SOLUTION INTRAVENOUS; SUBCUTANEOUS at 07:04

## 2024-04-21 RX ADMIN — FOLIC ACID 1 MG: 1 TABLET ORAL at 08:04

## 2024-04-21 RX ADMIN — VANCOMYCIN HYDROCHLORIDE 1250 MG: 1.25 INJECTION, POWDER, LYOPHILIZED, FOR SOLUTION INTRAVENOUS at 03:04

## 2024-04-21 RX ADMIN — CEFTRIAXONE SODIUM 2 G: 2 INJECTION, POWDER, FOR SOLUTION INTRAMUSCULAR; INTRAVENOUS at 02:04

## 2024-04-21 RX ADMIN — LORAZEPAM 2 MG: 1 TABLET ORAL at 08:04

## 2024-04-21 RX ADMIN — PHYTONADIONE 10 MG: 10 INJECTION, EMULSION INTRAMUSCULAR; INTRAVENOUS; SUBCUTANEOUS at 05:04

## 2024-04-21 RX ADMIN — POTASSIUM CHLORIDE 10 MEQ: 7.46 INJECTION, SOLUTION INTRAVENOUS at 03:04

## 2024-04-21 RX ADMIN — POTASSIUM CHLORIDE 10 MEQ: 7.46 INJECTION, SOLUTION INTRAVENOUS at 01:04

## 2024-04-21 RX ADMIN — POTASSIUM CHLORIDE 10 MEQ: 7.46 INJECTION, SOLUTION INTRAVENOUS at 12:04

## 2024-04-21 RX ADMIN — THERA TABS 1 TABLET: TAB at 08:04

## 2024-04-21 RX ADMIN — CALCIUM GLUCONATE 1 G: 98 INJECTION, SOLUTION INTRAVENOUS at 08:04

## 2024-04-21 RX ADMIN — THIAMINE HCL TAB 100 MG 100 MG: 100 TAB at 04:04

## 2024-04-21 RX ADMIN — MUPIROCIN: 20 OINTMENT TOPICAL at 09:04

## 2024-04-21 RX ADMIN — IOHEXOL 100 ML: 350 INJECTION, SOLUTION INTRAVENOUS at 10:04

## 2024-04-21 RX ADMIN — THIAMINE HYDROCHLORIDE 500 MG: 100 INJECTION, SOLUTION INTRAMUSCULAR; INTRAVENOUS at 08:04

## 2024-04-21 RX ADMIN — VANCOMYCIN HYDROCHLORIDE 1250 MG: 1.25 INJECTION, POWDER, LYOPHILIZED, FOR SOLUTION INTRAVENOUS at 04:04

## 2024-04-21 RX ADMIN — LACTULOSE 15 G: 20 SOLUTION ORAL at 08:04

## 2024-04-21 NOTE — SUBJECTIVE & OBJECTIVE
Interval History: [place] round on patient overnight as a proactive measure. Lactic acid now wnl. Sodium still trending down. MICU consulted, pt stable enough to continue under hospital medicine care. US liver showing ascites, Reversal of flow of the main portal, right portal, and left portal veins, which can be seen with liver disease. Hepatomegaly with heterogeneous echotexture. CTA negative for any intraabdominal bleeding.  Cirrhotic configuration liver with stigmata of portal venous hypertension and moderate volume abdominopelvic ascites. Relatively diffuse colonic wall thickening with submucosal edema could relate to portal colopathy.     Review of Systems  Objective:     Vital Signs (Most Recent):  Temp: 99.1 °F (37.3 °C) (04/21/24 0730)  Pulse: 101 (04/21/24 1157)  Resp: (!) 25 (04/21/24 0730)  BP: (!) 124/58 (04/21/24 0730)  SpO2: 95 % (04/21/24 0730) Vital Signs (24h Range):  Temp:  [97.1 °F (36.2 °C)-99.1 °F (37.3 °C)] 99.1 °F (37.3 °C)  Pulse:  [101-114] 101  Resp:  [18-25] 25  SpO2:  [92 %-97 %] 95 %  BP: ()/(50-63) 124/58     Weight: 71.8 kg (158 lb 4.6 oz)  Body mass index is 25.55 kg/m².    Intake/Output Summary (Last 24 hours) at 4/21/2024 1159  Last data filed at 4/21/2024 0730  Gross per 24 hour   Intake 552.5 ml   Output --   Net 552.5 ml         Physical Exam  Constitutional:       General: She is not in acute distress.  HENT:      Head: Normocephalic and atraumatic.   Eyes:      General: Scleral icterus present.      Extraocular Movements: Extraocular movements intact.      Pupils: Pupils are equal, round, and reactive to light.   Cardiovascular:      Rate and Rhythm: Normal rate and regular rhythm.      Heart sounds: No murmur heard.  Pulmonary:      Effort: Pulmonary effort is normal. No respiratory distress.      Breath sounds: No wheezing or rales.   Abdominal:      General: Abdomen is flat. Bowel sounds are normal. There is distension.      Palpations: Abdomen is soft.      Tenderness:  There is no abdominal tenderness. There is right CVA tenderness.   Musculoskeletal:         General: No swelling or tenderness. Normal range of motion.      Cervical back: Normal range of motion.      Right lower leg: No edema.      Left lower leg: No edema.   Lymphadenopathy:      Cervical: No cervical adenopathy.   Skin:     General: Skin is warm and dry.      Coloration: Skin is jaundiced.      Findings: Bruising present.      Comments: Spider angioma in the chest    Neurological:      General: No focal deficit present.      Mental Status: She is alert and oriented to person, place, and time.      Comments: Asterixis             Significant Labs: All pertinent labs within the past 24 hours have been reviewed.    Significant Imaging: I have reviewed all pertinent imaging results/findings within the past 24 hours.

## 2024-04-21 NOTE — CONSULTS
[facility] Tele[facility] Stepdown  Critical Care Medicine  Consult Note    Patient Name: Carola Tovar  MRN: [id number]  Admission Date: 4/20/2024  Hospital Length of Stay: 1 days  Code Status: Full Code  Attending Physician: Theresa Alvarez MD   Primary Care Provider: No, Primary Doctor   Principal Problem: Liver failure without hepatic coma    Inpatient consult to Critical Care Medicine  Consult performed by: Kelvin Santo MD  Consult ordered by: Kobi Villarreal MD  Reason for consult: hyponatremia  Assessment/Recommendations: No neurologic dysfunction  Pending workup   Continue liver and suspected UGIB workup         Subjective:     HPI:  39 yo F with alcohol abuse disorder who presented to OSH ED with jaundice. Pt is originally from [place], but have spent the last 1.5 months in [place] visiting a friend. For the past month she has started noticing yellow discoloration in her eyes and skin. Pt reports that she used to drink 48 oz of vodka daily, but for the past week she has taper down to only 16 oz per day. Last drink was yesterday 04/19. She also reports multiple episodes of withdrawal in the past, but no seizure episodes. She also report dysuria and increased urinary frequency. Denies headaches, visual changes, SOB, cough, chest pain, palpitation, nausea, vomiting, abdominal pain, diarrhea, constipation.      At the OSH ED the patient was afebrile tachycardic to 120 with /42. Lab work remarkable for Hb/Hct 7.4/21.0, INR 2.57, Na 120, K 2.5, Mg 0.7. Tbili of 24. U/A nitrite positive. Given IV CTX, Mg and K replacements. Hep studies negative. CT A/P shows cirrhosis and portal HTN, gastric diffuse bowel wall thickening and jose-pancreatic fat stranding. [place] [facility] [place]. Pt admitted to hospital medicine for further management of acute on chronic liver failure and hepatology was consulted.     Resolving lactic acidosis, CTA abdomen pending for suspected GI  bleed. MICU consulted for hyponatremia at 118 without neurologic dysfunction.     Hospital/ICU Course:  No notes on file    No past medical history on file.    No past surgical history on file.    Review of patient's allergies indicates:   Allergen Reactions    Sulfa (sulfonamide antibiotics) Hives       Family History    None       Tobacco Use    Smoking status: Not on file    Smokeless tobacco: Not on file   Substance and Sexual Activity    Alcohol use: Not on file    Drug use: Not on file    Sexual activity: Not on file      Review of Systems   All other systems reviewed and are negative.    Objective:     Vital Signs (Most Recent):  Temp: 99.1 °F (37.3 °C) (04/21/24 0730)  Pulse: 108 (04/21/24 0730)  Resp: (!) 25 (04/21/24 0730)  BP: (!) 124/58 (04/21/24 0730)  SpO2: 95 % (04/21/24 0730) Vital Signs (24h Range):  Temp:  [97.1 °F (36.2 °C)-99.1 °F (37.3 °C)] 99.1 °F (37.3 °C)  Pulse:  [104-124] 108  Resp:  [18-25] 25  SpO2:  [92 %-97 %] 95 %  BP: ()/(50-63) 124/58   Weight: 71.8 kg (158 lb 4.6 oz)  Body mass index is 25.55 kg/m².      Intake/Output Summary (Last 24 hours) at 4/21/2024 0827  Last data filed at 4/21/2024 0730  Gross per 24 hour   Intake 552.5 ml   Output --   Net 552.5 ml          Physical Exam  Vitals and nursing note reviewed.   Constitutional:       Comments: Chronically ill-appearing but awake and alert, no acute distress noted    HENT:      Head: Normocephalic and atraumatic.      Right Ear: External ear normal.      Left Ear: External ear normal.      Mouth/Throat:      Mouth: Mucous membranes are moist.      Pharynx: Oropharynx is clear.   Eyes:      General: Scleral icterus present.      Extraocular Movements: Extraocular movements intact.      Pupils: Pupils are equal, round, and reactive to light.   Cardiovascular:      Rate and Rhythm: Normal rate and regular rhythm.      Pulses: Normal pulses.      Heart sounds: Normal heart sounds.   Pulmonary:      Effort: Pulmonary effort is  normal. No respiratory distress.      Breath sounds: Normal breath sounds.   Abdominal:      Palpations: Abdomen is soft.      Tenderness: There is no abdominal tenderness. There is no guarding or rebound.   Musculoskeletal:         General: No deformity or signs of injury.      Cervical back: Neck supple.   Skin:     General: Skin is warm and dry.      Coloration: Skin is jaundiced.   Neurological:      Mental Status: She is oriented to person, place, and time. Mental status is at baseline.   Psychiatric:         Mood and Affect: Mood normal.         Behavior: Behavior normal.            Vents:     Lines/Drains/Airways       Peripheral Intravenous Line  Duration                  Peripheral IV - Single Lumen 04/20/24 1322 Posterior;Right Hand <1 day         Peripheral IV - Single Lumen 04/20/24 1708 Anterior;Proximal;Right Forearm <1 day         Peripheral IV - Single Lumen 04/20/24 2000 20 G Left;Posterior Hand <1 day         Peripheral IV - Single Lumen 04/20/24 2200 20 G Left Antecubital <1 day                  Significant Labs:    CBC/Anemia Profile:  Recent Labs   Lab 04/20/24 1437 04/20/24 1947 04/21/24 0442   WBC 12.47 10.71 9.95   HGB 6.9* 6.2* 8.6*   HCT 19.1* 17.5* 23.9*   PLT 42* 60* 53*   * 122* 109*   RDW 18.7* 18.4* SEE COMMENT        Chemistries:  Recent Labs   Lab 04/20/24 1436 04/20/24 1947 04/21/24 0443   * 122* 118*   K 2.7* 2.9* 3.8   CL 77* 78* 79*   CO2 21* 22* 26   BUN 11 12 13   CREATININE 0.7 0.8 0.8   CALCIUM 6.9* 6.7* 6.5*   ALBUMIN 2.4*  --  2.3*   PROT 5.5*  --  5.2*   BILITOT 21.7*  --  22.1*   ALKPHOS 161*  --  151*   ALT 47*  --  41   *  --  112*   MG 1.7  --  2.2   PHOS 3.5  --  2.5*       Recent Lab Results  (Last 5 results in the past 24 hours)        04/21/24  0443   04/21/24  0442   04/21/24  0023   04/20/24 1947 04/20/24  1539        Unit Blood Type Code               Unit Expiration               Unit Blood Type               Albumin 2.3                               ALT 41               Anion Gap 13       22         Aniso   Slight                            Baso #   0.05     0.02         Basophil %   0.5     0.2         BILIRUBIN TOTAL 22.1  Comment: For infants and newborns, interpretation of results should be based  on gestational age, weight and in agreement with clinical  observations.    Premature Infant recommended reference ranges:  Up to 24 hours.............<8.0 mg/dL  Up to 48 hours............<12.0 mg/dL  3-5 days..................<15.0 mg/dL  6-29 days.................<15.0 mg/dL                 Blood Culture, Routine         No Growth to date  [P]       BUN 13       12         Calcium 6.5  Comment: *Critical value notification by GAS with confirmation of receipt to   GORDON SALAMANCA RN at  Date 4/21/24 Time 7:17 AM         6.7  Comment: *Critical value notification by MKB with confirmation of receipt to   MICHAEL LUNA RN  at  Date 4/20/24 Time 8:32 PM           Chloride 79       78         CO2 26       22         CODING SYSTEM               Creatinine 0.8       0.8         Crossmatch Interpretation               Differential Method   Automated     Automated         DISPENSE STATUS               eGFR >60.0       >60.0         Eos #   0.2     0.1         Eos %   1.7     1.1         Glucose 114       104         Gran # (ANC)   7.1     7.9         Gran %   71.7     74.0         Group & Rh               Hematocrit   23.9     17.5  Comment: HCT critical result(s) called and verbal readback obtained from   Michael Luna RN. by RNS 04/20/2024 20:15           Hemoglobin   8.6     6.2         Hypo   Occasional             Immature Grans (Abs)   0.11  Comment: Mild elevation in immature granulocytes is non specific and   can be seen in a variety of conditions including stress response,   acute inflammation, trauma and pregnancy. Correlation with other   laboratory and clinical findings is essential.       0.12  Comment: Mild elevation in  immature granulocytes is non specific and   can be seen in a variety of conditions including stress response,   acute inflammation, trauma and pregnancy. Correlation with other   laboratory and clinical findings is essential.           Immature Granulocytes   1.1     1.1         INDIRECT MINGO               INR   2.3  Comment: Coumadin Therapy:  2.0 - 3.0 for INR for all indicators except mechanical heart valves  and antiphospholipid syndromes which should use 2.5 - 3.5.       2.2  Comment: Coumadin Therapy:  2.0 - 3.0 for INR for all indicators except mechanical heart valves  and antiphospholipid syndromes which should use 2.5 - 3.5.           Lactic Acid Level   2.6  Comment: Falsely low lactic acid results can be found in samples   containing >=13.0 mg/dL total bilirubin and/or >=3.5 mg/dL   direct bilirubin.       8.4  Comment: Falsely low lactic acid results can be found in samples   containing >=13.0 mg/dL total bilirubin and/or >=3.5 mg/dL   direct bilirubin.  *Critical value notification by MKKASHIF with confirmation of receipt to  MICHAEL CHAUDHARI RN at Date 4/20/24 Time 6:32 PM           Lymph #   1.0     1.0         Lymph %   9.8     9.2         Magnesium  2.2               MCH   39.1     43.1         MCHC   36.0     35.4         MCV   109     122         Mono #   1.5     1.5         Mono %   15.2     14.4         MPV   10.8     10.5         nRBC   1     1         Ovalocytes   Occasional             Phosphorus Level 2.5               Platelet Count   53     60         Poikilocytosis   Slight             Poly   Occasional             Potassium 3.8       2.9         Product Code               PROTEIN TOTAL 5.2               PT   24.2     22.7         RBC   2.20     1.44         RDW   SEE COMMENT  Comment: Result not available.     18.4         Sodium 118  Comment: *Critical value notification by GAS with confirmation of receipt to   GORDON SALAMANCA RN at  Date 4/21/24 Time 7:17 AM         122         Specimen Outdate   "             Spherocytes   Occasional             Troponin I     0.018  Comment: The reference interval for Troponin I represents the 99th percentile   cutoff   for our facility and is consistent with 3rd generation assay   performance.             UNIT NUMBER               WBC   9.95     10.71                                 [P] - Preliminary Result             All pertinent labs within the past 24 hours have been reviewed.    Significant Imaging: I have reviewed all pertinent imaging results/findings within the past 24 hours.    ABG  No results for input(s): "PH", "PO2", "PCO2", "HCO3", "BE" in the last 168 hours.  Assessment/Plan:     Endocrine  Hyponatremia  Hyponatremia on presentation without neurologic dysfunction (120>122>118). Unclear etiology. Appears at mental baseline without neurologic dysfunction.     -Recommend Urine Electrolytes, Serum Osm, Urine Osm to further characterize hyponatremia  -Hold crystalloid to assess cause   -trend BMP   -Recommend Echo to assess baseline cardiac function in the setting of suspected upper GI bleed requiring resuscitation   -Do not anticipate patient needs ICU level care at this point in her hospital course         Thank you for your consult.      Kelvin Santo MD  Critical Care Medicine  [facility] - Tele[facility] Stepdown  "

## 2024-04-21 NOTE — PROGRESS NOTES
[facility] - Telemetry [facility]  Progress Note    Patient Name: Carola Tovar  MRN: [id number]  Patient Class: IP- Inpatient   Admission Date: 4/20/2024  Length of Stay: 1 days  Attending Physician: Theresa Alvarez MD  Primary Care Provider: Roro, Primary Doctor        Subjective:     Principal Problem:Liver failure without hepatic coma        HPI:  Ms. Tovar is a 37 yo F with alcohol abuse disorder who presented to OSH ED with jaundice. Pt is originally from [place], but have spent the last 1.5 months in [place] visiting a friend. For the past month she has started noticing yellow discoloration in her eyes and skin. Pt reports that she used to drink 48 oz of vodka daily, but for the past week she has taper down to only 16 oz per day. Last drink was yesterday 04/19. She also reports multiple episodes of withdrawal in the past, but no seizure episodes. She also report dysuria and increased urinary frequency. Denies headaches, visual changes, SOB, cough, chest pain, palpitation, nausea, vomiting, abdominal pain, diarrhea, constipation.     At the OSH ED the patient was afebrile tachycardic to 120 with /42. Lab work remarkable for Hb/Hct 7.4/21.0, INR 2.57, Na 120, K 2.5, Mg 0.7. Tbili of 24. U/A nitrite positive. Given IV CTX, Mg and K replacements. Hep studies negative. CT A/P shows cirrhosis and portal HTN, gastric diffuse bowel wall thickening and jose-pancreatic fat stranding. Pt was transferred to [facility] for higher level of care. Pt admitted to hospital medicine for further management of acute on chronic liver failure and hepatology was consulted.     Overview/Hospital Course:  Pt admitted to hospital medicine for acute on chronic liver failure. GI and Hepatology consulted. Hgb 6.9 pt received 2 units of PRBC. Plts low and elevated INR and PT, cryo, plts and vit k given. Low calcium, calcium gluconate given. US liver showing ascites, Reversal of flow of the main portal, right portal, and  left portal veins, which can be seen with liver disease. Hepatomegaly with heterogeneous echotexture. CTA negative for any intraabdominal bleeding.  Cirrhotic configuration liver with stigmata of portal venous hypertension and moderate volume abdominopelvic ascites. Relatively diffuse colonic wall thickening with submucosal edema could relate to portal colopathy. Sodium trending down to 118, pt AOX4. Critical care consulted, pt stable to continue under hospital medicine services.     Interval History: [place] round on patient overnight as a proactive measure. Lactic acid now wnl. Sodium still trending down. MICU consulted, pt stable enough to continue under hospital medicine care. US liver showing ascites, Reversal of flow of the main portal, right portal, and left portal veins, which can be seen with liver disease. Hepatomegaly with heterogeneous echotexture. CTA negative for any intraabdominal bleeding.  Cirrhotic configuration liver with stigmata of portal venous hypertension and moderate volume abdominopelvic ascites. Relatively diffuse colonic wall thickening with submucosal edema could relate to portal colopathy.     Review of Systems  Objective:     Vital Signs (Most Recent):  Temp: 99.1 °F (37.3 °C) (04/21/24 0730)  Pulse: 101 (04/21/24 1157)  Resp: (!) 25 (04/21/24 0730)  BP: (!) 124/58 (04/21/24 0730)  SpO2: 95 % (04/21/24 0730) Vital Signs (24h Range):  Temp:  [97.1 °F (36.2 °C)-99.1 °F (37.3 °C)] 99.1 °F (37.3 °C)  Pulse:  [101-114] 101  Resp:  [18-25] 25  SpO2:  [92 %-97 %] 95 %  BP: ()/(50-63) 124/58     Weight: 71.8 kg (158 lb 4.6 oz)  Body mass index is 25.55 kg/m².    Intake/Output Summary (Last 24 hours) at 4/21/2024 1159  Last data filed at 4/21/2024 0730  Gross per 24 hour   Intake 552.5 ml   Output --   Net 552.5 ml         Physical Exam  Constitutional:       General: She is not in acute distress.  HENT:      Head: Normocephalic and atraumatic.   Eyes:      General: Scleral icterus  present.      Extraocular Movements: Extraocular movements intact.      Pupils: Pupils are equal, round, and reactive to light.   Cardiovascular:      Rate and Rhythm: Normal rate and regular rhythm.      Heart sounds: No murmur heard.  Pulmonary:      Effort: Pulmonary effort is normal. No respiratory distress.      Breath sounds: No wheezing or rales.   Abdominal:      General: Abdomen is flat. Bowel sounds are normal. There is distension.      Palpations: Abdomen is soft.      Tenderness: There is no abdominal tenderness. There is right CVA tenderness.   Musculoskeletal:         General: No swelling or tenderness. Normal range of motion.      Cervical back: Normal range of motion.      Right lower leg: No edema.      Left lower leg: No edema.   Lymphadenopathy:      Cervical: No cervical adenopathy.   Skin:     General: Skin is warm and dry.      Coloration: Skin is jaundiced.      Findings: Bruising present.      Comments: Spider angioma in the chest    Neurological:      General: No focal deficit present.      Mental Status: She is alert and oriented to person, place, and time.      Comments: Asterixis             Significant Labs: All pertinent labs within the past 24 hours have been reviewed.    Significant Imaging: I have reviewed all pertinent imaging results/findings within the past 24 hours.    Assessment/Plan:      * Liver failure without hepatic coma  Pt with a hx of alcohol use disorder presenting from OSH for complains of jaundice. She drinks 48 oz of vodka daily, however has been trying to taper down. Has a history of alcohol withdrawal in the past, but no seizures. As reported per transfer note lab work at OSH was remarkable for  Hb/Hct 7.4/21.0, INR 2.57, Na 120, K 2.5, Mg 0.7. Tbili of 24. . Hep studies negative. CT A/P shows cirrhosis and portal HTN, gastric diffuse bowel wall thickening and jose-pancreatic fat stranding. Physical exam remarkable for sclera icterus, jaundice, asterixis and  abdominal distention. US abdomen did not showed a viable pocket for paracentesis.     US liver showing ascites, Reversal of flow of the main portal, right portal, and left portal veins, which can be seen with liver disease. Hepatomegaly with heterogeneous echotexture.     CTA negative for any intraabdominal bleeding.  Cirrhotic configuration liver with stigmata of portal venous hypertension and moderate volume abdominopelvic ascites. Relatively diffuse colonic wall thickening with submucosal edema could relate to portal colopathy.    Plan  - Consult hepatology, appreciate recs  - Continue rocephin and vanc   - F/U CBC  - F/U CMP  - PT/INR  - Antimitochrodial antibody  - Anti smooth muscle antibody  - Alpha 1 antitrypsin        UGIB (upper gastrointestinal bleed)  [place] [facility] [place]. Has a history of alcohol use disorder. Physical exam remarkable for right CVA tenderness, jaundice, sclera icterus. Reports having melena on her stool. Hgb 6.9 and plts 42.     CTA negative for any intraabdominal bleeding.  Cirrhotic configuration liver with stigmata of portal venous hypertension and moderate volume abdominopelvic ascites. Relatively diffuse colonic wall thickening with submucosal edema could relate to portal colopathy.    Plan  - GI consulted, appreciate recs  - CTA abdomen, no bleeding   - Trend Hgb q6hrs. Transfuse for Hgb <7, unless otherwise indicated  - Maintain IV access with 2 large bore Ivs  - Intravascular resuscitation/support with IVFs   - NPO  - Hold all NSAIDs and anticoagulants, unless contraindicated  - Bolus IV pantoprazole 80mg followed by 40mg BID  - Please correct any coagulopathy with platelets and FFP for goal of platelets >50K and INR <2.0  - Please notify GI team if there is significant change in patient's clinical status      Acute cystitis  Pt presenting from OSH for concerns of liver failure. Pt reports dysuria and urinary frequency. UA at OSH remarkable for  nitrite positive. Given IV CTX.    Plan  - UA and urine culture  - continue rocephin       Acute blood loss anemia  Patient's anemia is currently uncontrolled. Has not received any PRBCs to date. Etiology likely d/t chronic disease due to Chronic liver disease  Current CBC reviewed-   Lab Results   Component Value Date    HGB 8.3 (L) 04/21/2024    HCT 23.2 (L) 04/21/2024     - s/p 2 PRBC  - Type and screen   - Transfusing one unit of blood  - Monitor serial CBC and transfuse if patient becomes hemodynamically unstable, symptomatic or H/H drops below 7/21.      Alcoholic cirrhosis  Patient with known Cirrhosis with Child's class C. Co-morbidities are present and inclusive of portal hypertension and anemia/pancytopenia.  MELD-Na score calculated; MELD 3.0: 33 at 4/21/2024 11:14 AM  MELD-Na: 33 at 4/21/2024 11:14 AM  Calculated from:  Serum Creatinine: 1.1 mg/dL at 4/21/2024 11:14 AM  Serum Sodium: 120 mmol/L (Using min of 125 mmol/L) at 4/21/2024 11:14 AM  Total Bilirubin: 23.9 mg/dL at 4/21/2024 11:14 AM  Serum Albumin: 2.3 g/dL at 4/21/2024 11:14 AM  INR(ratio): 2.3 at 4/21/2024  4:42 AM  Age at listing (hypothetical): 38 years  Sex: Female at 4/21/2024 11:14 AM      Continue chronic meds. Etiology likely ETOH. Will avoid any hepatotoxic meds, and monitor CBC/CMP/INR for synthetic function.     For plan please see liver failure    Coagulopathy  Pt with hx of alcohol use disorder and recently diagnosed alcohol liver cirrhosis. Lab work remarkable for elevated INR and Plts 42, PT 25.8 INR 2.5 Fibrinogen 105.    Plan  - Transfuse Cryoprecipitate   - Transfuse Platelets   - F/U CBC  - F/U PT/INR  - Might consider vit K    Hypomagnesemia  RESOLVED  Patient has Abnormal Magnesium: hypomagnesemia. Will continue to monitor electrolytes closely. Will replace the affected electrolytes and repeat labs to be done after interventions completed. The patient's magnesium results have been reviewed and are listed below.  Recent Labs    Lab 04/21/24  0443   MG 2.2        Hyponatremia  Patient has hyponatremia which is uncontrolled,We will aim to correct the sodium by 4-6mEq in 24 hours. We will monitor sodium Daily. The hyponatremia is due to Cirrhosis. We will obtain the following studies: Urine sodium, urine osmolality, serum osmolality. We will treat the hyponatremia with Fluid restriction of:  1.5 liter per day. The patient's sodium results have been reviewed and are listed below.  Recent Labs   Lab 04/21/24  1114   *       Alcohol use disorder  Pt with hx of alcohol use disorder presenting to [facility] with acute on chronic liver failure. Reports last drink was on 04/19/24. Also has a hx of alcohol withdrawal with no seizure activity.     Alcohol Withdrawal precautions:  - Vitals q4h while awake  - CIWA monitoring  - Lorazepam PRN if 2 criteria are met: Systolic BP>160, Diastolic BP >110 or Pulse >110  - Start Vitamin supplementation- Thiamine, Folic acid, Vit. B12, and Multivitamin qd        VTE Risk Mitigation (From admission, onward)           Ordered     IP VTE LOW RISK PATIENT  Once         04/20/24 1347     Place sequential compression device  Until discontinued         04/20/24 1347                    Discharge Planning   KARSON:      Code Status: Full Code   Is the patient medically ready for discharge?:     Reason for patient still in hospital (select all that apply): Patient unstable, Patient trending condition, Treatment, and Consult recommendations  Discharge Plan A: Home with family                  Kobi Rodriguez MD  Department of Hospital Medicine   [facility] - Telemetry Stepdown

## 2024-04-21 NOTE — PLAN OF CARE
Problem: Adult Inpatient Plan of Care  Goal: Plan of Care Review  Outcome: Ongoing, Progressing  Goal: Patient-Specific Goal (Individualized)  Outcome: Ongoing, Progressing  Goal: Absence of Hospital-Acquired Illness or Injury  Outcome: Ongoing, Progressing  Goal: Optimal Comfort and Wellbeing  Outcome: Ongoing, Progressing  Goal: Readiness for Transition of Care  Outcome: Ongoing, Progressing     Pt in bed no acute distress noted at this time. No complaints voiced. Call light in reach and Safety measures in progress.

## 2024-04-21 NOTE — PLAN OF CARE
Patient AAOx3, independent at baseline. Resident of [place] here visiting for past month living with significant other in [place]. Family to provide transportation home.   04/21/24 1053   Discharge Assessment   Assessment Type Discharge Planning Assessment   Confirmed/corrected address, phone number and insurance Yes   Confirmed Demographics Correct on Facesheet   Source of Information patient   Communicated KARSON with patient/caregiver Date not available/Unable to determine   People in Home significant other   Do you expect to return to your current living situation? Yes   Do you have help at home or someone to help you manage your care at home? Yes   Who are your caregiver(s) and their phone number(s)? Daljit Fernandez (Significant other)  [phone] (Mobile)   Prior to hospitilization cognitive status: Alert/Oriented   Current cognitive status: Alert/Oriented   Walking or Climbing Stairs Difficulty no   Dressing/Bathing Difficulty no   Equipment Currently Used at Home none   Readmission within 30 days? No   Do you currently have service(s) that help you manage your care at home? No   Do you have prescription coverage? No   Do you have any problems affording any of your prescribed medications? No   Who is going to help you get home at discharge? Daljit Fernandez (Significant other)  [phone] (Mobile)   How do you get to doctors appointments? family or friend will provide   Are you on dialysis? No   Do you take coumadin? No   Discharge Plan A Home with family   Discharge Plan B Home   DME Needed Upon Discharge  none   Discharge Plan discussed with: Patient   Transition of Care Barriers Unisured;Does not adhere to care plan   Physical Activity   On average, how many days per week do you engage in moderate to strenuous exercise (like a brisk walk)? 0 days   On average, how many minutes do you engage in exercise at this level? 0 min   Financial Resource Strain   How hard is it for you to pay for the very basics like food,  housing, medical care, and heating? Not hard   Housing Stability   In the last 12 months, was there a time when you were not able to pay the mortgage or rent on time? N   At any time in the past 12 months, were you homeless or living in a shelter (including now)? N   Transportation Needs   In the past 12 months, has lack of transportation kept you from medical appointments or from getting medications? no   In the past 12 months, has lack of transportation kept you from meetings, work, or from getting things needed for daily living? No   Food Insecurity   Within the past 12 months, you worried that your food would run out before you got the money to buy more. Never true   Within the past 12 months, the food you bought just didn't last and you didn't have money to get more. Never true   Stress   Do you feel stress - tense, restless, nervous, or anxious, or unable to sleep at night because your mind is troubled all the time - these days? Very much   Social Connections   In a typical week, how many times do you talk on the phone with family, friends, or neighbors? More than 3   How often do you get together with friends or relatives? Once   How often do you attend Zoroastrianism or Catholic services? Never   Do you belong to any clubs or organizations such as Zoroastrianism groups, unions, fraternal or athletic groups, or school groups? No   How often do you attend meetings of the clubs or organizations you belong to? Never   Are you , , , , never , or living with a partner? Living with   Alcohol Use   Q1: How often do you have a drink containing alcohol? 4 or more ti   Q2: How many drinks containing alcohol do you have on a typical day when you are drinking? 5 or 6   Q3: How often do you have six or more drinks on one occasion? Daily     Timmy Leon - Telemetry Stepdown  Initial Discharge Assessment       Primary Care Provider: No, Primary Doctor    Admission Diagnosis: Decompensated hepatic  cirrhosis [K72.90, K74.60]    Admission Date: 4/20/2024  Expected Discharge Date:     Transition of Care Barriers: (P) Unisured, Does not adhere to care plan    Payor: /     Extended Emergency Contact Information  Primary Emergency Contact: Daljit Fernandez  Mobile Phone: [phone]  Relation: Significant other  Secondary Emergency Contact: Joanie meyers  Mobile Phone: [phone]  Relation: Friend    Discharge Plan A: (P) Home with family  Discharge Plan B: (P) Home    No Pharmacies Listed    Initial Assessment (most recent)       Adult Discharge Assessment - 04/21/24 1053          Discharge Assessment    Assessment Type Discharge Planning Assessment (P)      Confirmed/corrected address, phone number and insurance Yes (P)      Confirmed Demographics Correct on Facesheet (P)      Source of Information patient (P)      Communicated KARSON with patient/caregiver Date not available/Unable to determine (P)      People in Home significant other (P)      Do you expect to return to your current living situation? Yes (P)      Do you have help at home or someone to help you manage your care at home? Yes (P)      Who are your caregiver(s) and their phone number(s)? Daljit Fernandez (Significant other)  [phone] (Mobile) (P)      Prior to hospitilization cognitive status: Alert/Oriented (P)      Current cognitive status: Alert/Oriented (P)      Walking or Climbing Stairs Difficulty no (P)      Dressing/Bathing Difficulty no (P)      Equipment Currently Used at Home none (P)      Readmission within 30 days? No (P)      Do you currently have service(s) that help you manage your care at home? No (P)      Do you have prescription coverage? No (P)      Do you have any problems affording any of your prescribed medications? No (P)      Who is going to help you get home at discharge? Daljit Fernandez (Significant other)  [phone] (Mobile) (P)      How do you get to doctors appointments? family or friend will provide (P)      Are you on dialysis?  No (P)      Do you take coumadin? No (P)      Discharge Plan A Home with family (P)      Discharge Plan B Home (P)      DME Needed Upon Discharge  none (P)      Discharge Plan discussed with: Patient (P)      Transition of Care Barriers Unisured;Does not adhere to care plan (P)         Physical Activity    On average, how many days per week do you engage in moderate to strenuous exercise (like a brisk walk)? 0 days (P)      On average, how many minutes do you engage in exercise at this level? 0 min (P)         Financial Resource Strain    How hard is it for you to pay for the very basics like food, housing, medical care, and heating? Not hard at all (P)         Housing Stability    In the last 12 months, was there a time when you were not able to pay the mortgage or rent on time? No (P)      At any time in the past 12 months, were you homeless or living in a shelter (including now)? No (P)         Transportation Needs    In the past 12 months, has lack of transportation kept you from medical appointments or from getting medications? No (P)      In the past 12 months, has lack of transportation kept you from meetings, work, or from getting things needed for daily living? No (P)         Food Insecurity    Within the past 12 months, you worried that your food would run out before you got the money to buy more. Never true (P)      Within the past 12 months, the food you bought just didn't last and you didn't have money to get more. Never true (P)         Stress    Do you feel stress - tense, restless, nervous, or anxious, or unable to sleep at night because your mind is troubled all the time - these days? Very much (P)         Social Connections    In a typical week, how many times do you talk on the phone with family, friends, or neighbors? More than three times a week (P)      How often do you get together with friends or relatives? Once a week (P)      How often do you attend Orthodox or Shinto services? Never (P)       Do you belong to any clubs or organizations such as Worship groups, unions, fraternal or athletic groups, or school groups? No (P)      How often do you attend meetings of the clubs or organizations you belong to? Never (P)      Are you , , , , never , or living with a partner? Living with partner (P)         Alcohol Use    Q1: How often do you have a drink containing alcohol? 4 or more times a week (P)      Q2: How many drinks containing alcohol do you have on a typical day when you are drinking? 5 or 6 (P)      Q3: How often do you have six or more drinks on one occasion? Daily or almost daily (P)

## 2024-04-21 NOTE — ASSESSMENT & PLAN NOTE
Hyponatremia on presentation without neurologic dysfunction (120>122>118). Unclear etiology. Appears at mental baseline without neurologic dysfunction.     -Recommend Urine Electrolytes, Serum Osm, Urine Osm to further characterize hyponatremia  -Hold crystalloid to assess cause   -trend BMP   -Recommend Echo to assess baseline cardiac function in the setting of suspected upper GI bleed requiring resuscitation   -Do not anticipate patient needs ICU level care at this point in her hospital course

## 2024-04-21 NOTE — NURSING
Notified MD Michael of pt's critical labs and that pt is c/o chest discomfort. Michael TUCKER came to pt's bedside and assessed pt. No new  orders given at this time. Pt remains in bed with the call light in reach and the bed alarm on.

## 2024-04-21 NOTE — SUBJECTIVE & OBJECTIVE
No past medical history on file.    No past surgical history on file.    Review of patient's allergies indicates:   Allergen Reactions    Sulfa (sulfonamide antibiotics) Hives       Family History    None       Tobacco Use    Smoking status: Not on file    Smokeless tobacco: Not on file   Substance and Sexual Activity    Alcohol use: Not on file    Drug use: Not on file    Sexual activity: Not on file      Review of Systems   All other systems reviewed and are negative.    Objective:     Vital Signs (Most Recent):  Temp: 99.1 °F (37.3 °C) (04/21/24 0730)  Pulse: 108 (04/21/24 0730)  Resp: (!) 25 (04/21/24 0730)  BP: (!) 124/58 (04/21/24 0730)  SpO2: 95 % (04/21/24 0730) Vital Signs (24h Range):  Temp:  [97.1 °F (36.2 °C)-99.1 °F (37.3 °C)] 99.1 °F (37.3 °C)  Pulse:  [104-124] 108  Resp:  [18-25] 25  SpO2:  [92 %-97 %] 95 %  BP: ()/(50-63) 124/58   Weight: 71.8 kg (158 lb 4.6 oz)  Body mass index is 25.55 kg/m².      Intake/Output Summary (Last 24 hours) at 4/21/2024 0827  Last data filed at 4/21/2024 0730  Gross per 24 hour   Intake 552.5 ml   Output --   Net 552.5 ml          Physical Exam  Vitals and nursing note reviewed.   Constitutional:       Comments: Chronically ill-appearing but awake and alert, no acute distress noted    HENT:      Head: Normocephalic and atraumatic.      Right Ear: External ear normal.      Left Ear: External ear normal.      Mouth/Throat:      Mouth: Mucous membranes are moist.      Pharynx: Oropharynx is clear.   Eyes:      General: Scleral icterus present.      Extraocular Movements: Extraocular movements intact.      Pupils: Pupils are equal, round, and reactive to light.   Cardiovascular:      Rate and Rhythm: Normal rate and regular rhythm.      Pulses: Normal pulses.      Heart sounds: Normal heart sounds.   Pulmonary:      Effort: Pulmonary effort is normal. No respiratory distress.      Breath sounds: Normal breath sounds.   Abdominal:      Palpations: Abdomen is soft.       Tenderness: There is no abdominal tenderness. There is no guarding or rebound.   Musculoskeletal:         General: No deformity or signs of injury.      Cervical back: Neck supple.   Skin:     General: Skin is warm and dry.      Coloration: Skin is jaundiced.   Neurological:      Mental Status: She is oriented to person, place, and time. Mental status is at baseline.   Psychiatric:         Mood and Affect: Mood normal.         Behavior: Behavior normal.            Vents:     Lines/Drains/Airways       Peripheral Intravenous Line  Duration                  Peripheral IV - Single Lumen 04/20/24 1322 Posterior;Right Hand <1 day         Peripheral IV - Single Lumen 04/20/24 1708 Anterior;Proximal;Right Forearm <1 day         Peripheral IV - Single Lumen 04/20/24 2000 20 G Left;Posterior Hand <1 day         Peripheral IV - Single Lumen 04/20/24 2200 20 G Left Antecubital <1 day                  Significant Labs:    CBC/Anemia Profile:  Recent Labs   Lab 04/20/24 1437 04/20/24 1947 04/21/24 0442   WBC 12.47 10.71 9.95   HGB 6.9* 6.2* 8.6*   HCT 19.1* 17.5* 23.9*   PLT 42* 60* 53*   * 122* 109*   RDW 18.7* 18.4* SEE COMMENT        Chemistries:  Recent Labs   Lab 04/20/24 1436 04/20/24 1947 04/21/24 0443   * 122* 118*   K 2.7* 2.9* 3.8   CL 77* 78* 79*   CO2 21* 22* 26   BUN 11 12 13   CREATININE 0.7 0.8 0.8   CALCIUM 6.9* 6.7* 6.5*   ALBUMIN 2.4*  --  2.3*   PROT 5.5*  --  5.2*   BILITOT 21.7*  --  22.1*   ALKPHOS 161*  --  151*   ALT 47*  --  41   *  --  112*   MG 1.7  --  2.2   PHOS 3.5  --  2.5*       Recent Lab Results  (Last 5 results in the past 24 hours)        04/21/24  0443   04/21/24  0442   04/21/24  0023   04/20/24 1947 04/20/24  1539        Unit Blood Type Code               Unit Expiration               Unit Blood Type               Albumin 2.3                              ALT 41               Anion Gap 13       22         Aniso   Slight                             Baso #   0.05     0.02         Basophil %   0.5     0.2         BILIRUBIN TOTAL 22.1  Comment: For infants and newborns, interpretation of results should be based  on gestational age, weight and in agreement with clinical  observations.    Premature Infant recommended reference ranges:  Up to 24 hours.............<8.0 mg/dL  Up to 48 hours............<12.0 mg/dL  3-5 days..................<15.0 mg/dL  6-29 days.................<15.0 mg/dL                 Blood Culture, Routine         No Growth to date  [P]       BUN 13       12         Calcium 6.5  Comment: *Critical value notification by [facility] with confirmation of receipt to   GORDON SALAMANCA RN at  Date 4/21/24 Time 7:17 AM         6.7  Comment: *Critical value notification by [facility] with confirmation of receipt to   MICHAEL LUNA RN  at  Date 4/20/24 Time 8:32 PM           Chloride 79       78         CO2 26       22         CODING SYSTEM               Creatinine 0.8       0.8         Crossmatch Interpretation               Differential Method   Automated     Automated         DISPENSE STATUS               eGFR >60.0       >60.0         Eos #   0.2     0.1         Eos %   1.7     1.1         Glucose 114       104         Gran # (ANC)   7.1     7.9         Gran %   71.7     74.0         Group & Rh               Hematocrit   23.9     17.5  Comment: HCT critical result(s) called and verbal readback obtained from   Michael Luna RN. by RNS 04/20/2024 20:15           Hemoglobin   8.6     6.2         Hypo   Occasional             Immature Grans (Abs)   0.11  Comment: Mild elevation in immature granulocytes is non specific and   can be seen in a variety of conditions including stress response,   acute inflammation, trauma and pregnancy. Correlation with other   laboratory and clinical findings is essential.       0.12  Comment: Mild elevation in immature granulocytes is non specific and   can be seen in a variety of conditions including stress response,   acute inflammation,  trauma and pregnancy. Correlation with other   laboratory and clinical findings is essential.           Immature Granulocytes   1.1     1.1         INDIRECT MINGO               INR   2.3  Comment: Coumadin Therapy:  2.0 - 3.0 for INR for all indicators except mechanical heart valves  and antiphospholipid syndromes which should use 2.5 - 3.5.       2.2  Comment: Coumadin Therapy:  2.0 - 3.0 for INR for all indicators except mechanical heart valves  and antiphospholipid syndromes which should use 2.5 - 3.5.           Lactic Acid Level   2.6  Comment: Falsely low lactic acid results can be found in samples   containing >=13.0 mg/dL total bilirubin and/or >=3.5 mg/dL   direct bilirubin.       8.4  Comment: Falsely low lactic acid results can be found in samples   containing >=13.0 mg/dL total bilirubin and/or >=3.5 mg/dL   direct bilirubin.  *Critical value notification by [facility] with confirmation of receipt to  MICHAEL CHAUDHARI RN at Date 4/20/24 Time 6:32 PM           Lymph #   1.0     1.0         Lymph %   9.8     9.2         Magnesium  2.2               MCH   39.1     43.1         MCHC   36.0     35.4         MCV   109     122         Mono #   1.5     1.5         Mono %   15.2     14.4         MPV   10.8     10.5         nRBC   1     1         Ovalocytes   Occasional             Phosphorus Level 2.5               Platelet Count   53     60         Poikilocytosis   Slight             Poly   Occasional             Potassium 3.8       2.9         Product Code               PROTEIN TOTAL 5.2               PT   24.2     22.7         RBC   2.20     1.44         RDW   SEE COMMENT  Comment: Result not available.     18.4         Sodium 118  Comment: *Critical value notification by [facility]AS with confirmation of receipt to   GORDON SALAMANCA RN at  Date 4/21/24 Time 7:17 AM         122         Specimen Outdate               Spherocytes   Occasional             Troponin I     0.018  Comment: The reference interval for Troponin I represents  the 99th percentile   cutoff   for our facility and is consistent with 3rd generation assay   performance.             UNIT NUMBER               WBC   9.95     10.71                                 [P] - Preliminary Result             All pertinent labs within the past 24 hours have been reviewed.    Significant Imaging: I have reviewed all pertinent imaging results/findings within the past 24 hours.

## 2024-04-21 NOTE — CONSULTS
[facility]  Gastroenterology  Consult Note    Patient Name: Carola Tovar  MRN: [id number]  Admission Date: 2024  Hospital Length of Stay: 1 days  Code Status: Full Code   Attending Provider: Theresa Alvarez MD   Consulting Provider: Kunal Cardozo DO  Primary Care Physician: Roro, Primary Doctor  Principal Problem:Liver failure without hepatic coma    Inpatient consult to Gastroenterology  Consult performed by: Kunal Cardozo DO  Consult ordered by: Antonio Colindres DO        Subjective:     HPI: Carola Tovar is a 38 y.o. female with EtOH use disorder and decompensated EtOH cirrhosis complicated by thrombocytopenia, previously reported variceal bleeding (x1 episode) s/p banding (remote 4-5 years ago), and ascites that presents to [facility] with jaundice. Reports that she was sober for 4 years, but relapsed in  near 6-7 months ago for family/social issues that she did not want to divulge further details about when probed. GI consulted for evaluation of melena. HDS on arrival. Hgb on arrival 6.2 on . S/p 2 units of PRBC's, 1 unit of cryo and 1 unit of PLTS with repeat Hgb at 8.6. Hepatology consulted for management of decompensated cirrhosis. The patient is from [place] and so records not available. She is visiting friends in [place] for the last month, which is how she now arrives at [facility]. She notes that she has had variceal bleeding once before many years ago and was banded. She does not know how many bands were placed. She denies any hematemesis, CGE or hematochezia. She has been having dark tarry stools for 1.5 weeks. Believes she has been jaundiced for the last month. She does not feel her abdomen is as distended as she has had in the past several years ago when she had to be drained with a paracentesis. She was drinking up to 48 oz's of vodka per day.      Imagin24- CTA- No intraluminal intestinal, intraperitoneal, or retroperitoneal hemorrhage     No past medical history on  file.    No past surgical history on file.    No family history on file.    Social History     Socioeconomic History    Marital status: Unknown     Social Determinants of Health     Financial Resource Strain: Low Risk  (4/21/2024)    Overall Financial Resource Strain (CARDIA)     Difficulty of Paying Living Expenses: Not hard at all   Food Insecurity: No Food Insecurity (4/21/2024)    Hunger Vital Sign     Worried About Running Out of Food in the Last Year: Never true     Ran Out of Food in the Last Year: Never true   Transportation Needs: No Transportation Needs (4/21/2024)    PRAPARE - Transportation     Lack of Transportation (Medical): No     Lack of Transportation (Non-Medical): No   Physical Activity: Inactive (4/21/2024)    Exercise Vital Sign     Days of Exercise per Week: 0 days     Minutes of Exercise per Session: 0 min   Stress: Stress Concern Present (4/21/2024)    [employer] [place] [employer] - Occupational Stress Questionnaire     Feeling of Stress : Very much   Social Connections: Moderately Isolated (4/21/2024)    Social Connection and Isolation Panel [NHANES]     Frequency of Communication with Friends and Family: More than three times a week     Frequency of Social Gatherings with Friends and Family: Once a week     Attends Restorationist Services: Never     Active Member of Clubs or Organizations: No     Attends Club or Organization Meetings: Never     Marital Status: Living with partner   Housing Stability: Unknown (4/21/2024)    Housing Stability Vital Sign     Unable to Pay for Housing in the Last Year: No     Homeless in the Last Year: No       No current facility-administered medications on file prior to encounter.     No current outpatient medications on file prior to encounter.       Review of patient's allergies indicates:   Allergen Reactions    Sulfa (sulfonamide antibiotics) Hives       Review of Systems   Constitutional:  Positive for malaise/fatigue. Negative for chills and  fever.   HENT:  Negative for congestion and sore throat.    Respiratory:  Negative for cough and shortness of breath.    Cardiovascular:  Negative for chest pain and palpitations.   Gastrointestinal:  Positive for melena. Negative for abdominal pain, constipation, diarrhea, nausea and vomiting.   Genitourinary:  Negative for dysuria and frequency.   Musculoskeletal:  Negative for back pain and myalgias.   Skin:  Negative for itching and rash.   Neurological:  Negative for dizziness and headaches.      Objective:     Vitals:    04/21/24 1308   BP:    Pulse: 85   Resp:    Temp: 99.1 °F (37.3 °C)     Constitutional:  not in acute distress and ill appearing, but non-toxic  HENT: Head: Normal, normocephalic, atraumatic.  Eyes: conjunctiva clear and sclera icteric  Cardiovascular: regular rate and rhythm  Respiratory: normal chest expansion & respiratory effort   and no accessory muscle use  GI: soft, non-tender, without masses or organomegaly  Musculoskeletal: no muscular tenderness noted  Skin: jaundice present, upper chest wall with multiple spider angioma's    Neurological: alert, oriented x3  Psychiatric: mood and affect are within normal limits    Significant Labs:  Recent Labs   Lab 04/20/24  1947 04/21/24  0442 04/21/24  1114   HGB 6.2* 8.6* 8.3*       Lab Results   Component Value Date    WBC 10.71 04/21/2024    HGB 8.3 (L) 04/21/2024    HCT 23.2 (L) 04/21/2024     (H) 04/21/2024    PLT 54 (L) 04/21/2024       Lab Results   Component Value Date     (L) 04/21/2024    K 3.6 04/21/2024    CL 81 (L) 04/21/2024    CO2 24 04/21/2024    BUN 16 04/21/2024    CREATININE 1.1 04/21/2024    CALCIUM 7.0 (L) 04/21/2024    ANIONGAP 15 04/21/2024       Lab Results   Component Value Date    ALT 42 04/21/2024     (H) 04/21/2024    ALKPHOS 140 (H) 04/21/2024    BILITOT 23.9 (H) 04/21/2024       Lab Results   Component Value Date    INR 2.3 (H) 04/21/2024    INR 2.2 (H) 04/20/2024    INR 2.5 (H) 04/20/2024        Significant Imaging:  Reviewed pertinent radiology findings.       Assessment/Plan:     Carola Tovar is a 38 y.o. female with EtOH use disorder and decompensated EtOH cirrhosis complicated by thrombocytopenia, previously reported variceal bleeding (x1 episode) s/p banding (remote 4-5 years ago), and ascites (previously required paracentesis) that [place] [facility] [place]. Reports that she was sober for 4 years, but relapsed in  near 6-7 months ago for family/social issues that she did not want to divulge further details about when probed. GI consulted for evaluation of melena. HDS on arrival. Hgb on arrival 6.2 on . S/p 2 units of PRBC's, 1 unit of cryo and 1 unit of PLTS with repeat Hgb at 8.6.     Imagin24- CTA- No intraluminal intestinal, intraperitoneal, or retroperitoneal hemorrhage     Problem List:  Melena  Decompensated EtOH Cirrhosis with Jaundice and GI bleeding   EtOH Use Disorder   EtOH Hepatitis   Hyponatremia  Reported Variceal Bleeding s/p banding (per patient, remote, 4-5 years ago)     Recommendations:  - Tentative plan for EGD on Monday,  pending further medical optimization of concurrent medical problems such as profound hyponatremia   - Ok for clears today, Diet NPO at midnight   - Trend Hgb and transfuse for goal Hgb > 7, unless otherwise indicated  - Maintain IV access with 2 large bore IV's  - Intravascular resuscitation/support with IVF's   - Hold all NSAIDs and anticoagulants, unless contraindicated  - Bolus IV pantoprazole 80 mg followed by 40 mg BID  - Continue octreotide gtt for now, though clinical suspicion is lower for variceal bleed given clinical presentation   - SBP ppx per hepatology and primary with cirrhosis and GI bleeding   - Reasonable to give Vit K 10 mg IV QD for 3 days; but will defer to hepatology   - Please correct any coagulopathy with platelets and FFP for goal of platelets >50K and INR <2.0  - Please notify GI team if there is  significant change in patient's clinical status    Thank you for involving us in the care of Carola Tovar. Please call with any additional questions, concerns or changes in the patient's clinical status. We will continue to follow.     Kunal Cardozo DO  Gastroenterology Fellow PGY- IV  [facility]

## 2024-04-21 NOTE — HOSPITAL COURSE
Pt admitted to hospital medicine for decompensated hepatic cirrhosis 2/2 to alcohol use complicated by HE and melena. MELD was 31 during admission. GI and Hepatology consulted. Hgb 6.9 pt received 2 units of PRBC. Plts low and elevated INR and PT, cryo, plts and vit k given. Low calcium, calcium gluconate given. US liver showing ascites, Reversal of flow of the main portal, right portal, and left portal veins, which can be seen with liver disease. Hepatomegaly with heterogeneous echotexture. CTA negative for any intraabdominal bleeding.  Cirrhotic configuration liver with stigmata of portal venous hypertension and moderate volume abdominopelvic ascites. Relatively diffuse colonic wall thickening with submucosal edema could relate to portal colopathy. Sodium trending down to 118, pt AOX4. Critical care consulted, pt stable to continue under hospital medicine services. Initiated on octreotide, midodrine, and albumin. Hepatology consulted for transplant evaluation, deemed not a candidate. Treated with prn ativan for alcohol withdrawal. Developed worsening PB concerning for HRS, nephrology consulted. Patient did not tolerate albumin challenge which made HRS less likely. Patient increased oxygen requirements night to 30L HFNC from 2L NC. MICU recommended continued diuresis. Oxygen requirements improved with spot diuresis Lasix 40 x2, however she continued to have 1-2L O2 requirement. Hepatology did not think she was a candidate for liver transplant due to her active alcohol use despite knowing of her liver disease. Her EGD was delayed on 4/23 due to worsening oxygen requirements overnight. She has not had further bleeding episodes, thus EGD then deferred. Azithromycin added for atypical PNA 4/25 due to cough that started before her admission. RIP was negative. Unable to find a pocket for paracentesis. She completed SBP ppx. Patient was agreeable to follow with palliative in [place]. Her support system includes her  boyfriend and 4 children. She will likely need hospice in the future. Evaluated by PT/OT that recommended a shower chair on discharge. Her respiratory status was a barrier to discharge as she remained on 1-2 L NC. Although echo on admission was normal, cirrhotic cardiomyopathy suspected as she improved with diuresis. She passed a six minute walk test off oxygen before discharge. She was given a short course of Percocet for back pain, PO Torsemide 20 daily for diuresis, and hydroxyzine for anxiety.

## 2024-04-21 NOTE — PLAN OF CARE
Problem: Adult Inpatient Plan of Care  Goal: Plan of Care Review  Outcome: Ongoing, Progressing  Goal: Patient-Specific Goal (Individualized)  Outcome: Ongoing, Progressing  Goal: Absence of Hospital-Acquired Illness or Injury  Outcome: Ongoing, Progressing  Goal: Optimal Comfort and Wellbeing  Outcome: Ongoing, Progressing  Goal: Readiness for Transition of Care  Outcome: Ongoing, Progressing     Pt remained free of falls or injuries during shift. Pt in bed with the call light in reach and the bed alarm on.

## 2024-04-21 NOTE — ASSESSMENT & PLAN NOTE
Pt presenting to [facility] for acute on chronic liver failure. Has a history of alcohol use disorder. Physical exam remarkable for right CVA tenderness, jaundice, sclera icterus. Reports having melena on her stool. Hgb 6.9 and plts 42.     CTA negative for any intraabdominal bleeding.  Cirrhotic configuration liver with stigmata of portal venous hypertension and moderate volume abdominopelvic ascites. Relatively diffuse colonic wall thickening with submucosal edema could relate to portal colopathy.    Plan  - GI consulted, appreciate recs  - CTA abdomen, no bleeding   - Trend Hgb q6hrs. Transfuse for Hgb <7, unless otherwise indicated  - Maintain IV access with 2 large bore Ivs  - Intravascular resuscitation/support with IVFs   - NPO  - Hold all NSAIDs and anticoagulants, unless contraindicated  - Bolus IV pantoprazole 80mg followed by 40mg BID  - Please correct any coagulopathy with platelets and FFP for goal of platelets >50K and INR <2.0  - Please notify GI team if there is significant change in patient's clinical status

## 2024-04-21 NOTE — ASSESSMENT & PLAN NOTE
Patient has hyponatremia which is uncontrolled,We will aim to correct the sodium by 4-6mEq in 24 hours. We will monitor sodium Daily. The hyponatremia is due to Cirrhosis. We will obtain the following studies: Urine sodium, urine osmolality, serum osmolality. We will treat the hyponatremia with Fluid restriction of:  1.5 liter per day. The patient's sodium results have been reviewed and are listed below.  Recent Labs   Lab 04/21/24  1114   *

## 2024-04-21 NOTE — CONSULTS
[facility]  Hepatology  Consult Note    Patient Name: Carola Tovar  MRN: [id number]  Admission Date: 4/20/2024  Hospital Length of Stay: 1 days  Code Status: Full Code   Attending Provider: Theresa Alvarez MD   Consulting Provider: Acosta Alvarez MD  Primary Care Physician: No, Primary Doctor  Principal Problem:Liver failure without hepatic coma    Inpatient consult to Hepatology  Consult performed by: Acosta Alvarez MD  Consult ordered by: Antonio Colindres DO        Subjective:     HPI: Carola Tovar is a 38 y.o. female with history of alcohol use disorder and cirrhosis who is transferred from [facility] for alcoholic hepatitis. Presented for jaundice. TB 24, INR 2.5 Na 120 at OSH. CT demonstrated cirrhotic appearing liver with portal HTN as well as jose-pancreatic fat stranding at OSH. Lipase is 52. Reportedly not confused at OSH. Last drink was yesterday 4/20. Tachycardic, mildly hypotensive at presentation.     Lactate was 11.2, now 2.6. Cr 0.8. Na 120 > 118 since presentation. WBC 9.     Visiting friends in [place] for the past month. Lives in [place]. Her boyfriend has been with her in [place]. Reports she also has family support at home in [place]. Regarding alcohol history, she had a period of sobriety for 4 years but relapsed about 9-10 months ago due to life stressors. Had previously established sobriety after living in [facility] and participating in rehab program there. Lately has been drinking 3-4 16 oz malt beverages daily. Has history of hallucinations during withdrawal several years ago, so she presented due to jaundice and concern for withdrawal as she attempts to quit drinking. Reports she was told she had cirrhosis at some point last year after she restarted drinking, and that she was told to stop.        No past medical history on file.    No past surgical history on file.    No family history on file.    Social History     Socioeconomic History    Marital status:  Unknown     Social Determinants of Health     Financial Resource Strain: Low Risk  (4/21/2024)    Overall Financial Resource Strain (CARDIA)     Difficulty of Paying Living Expenses: Not hard at all   Food Insecurity: No Food Insecurity (4/21/2024)    Hunger Vital Sign     Worried About Running Out of Food in the Last Year: Never true     Ran Out of Food in the Last Year: Never true   Transportation Needs: No Transportation Needs (4/21/2024)    PRAPARE - Transportation     Lack of Transportation (Medical): No     Lack of Transportation (Non-Medical): No   Physical Activity: Inactive (4/21/2024)    Exercise Vital Sign     Days of Exercise per Week: 0 days     Minutes of Exercise per Session: 0 min   Stress: Stress Concern Present (4/21/2024)    [employer] [place] [employer] - Occupational Stress Questionnaire     Feeling of Stress : Very much   Social Connections: Moderately Isolated (4/21/2024)    Social Connection and Isolation Panel [NHANES]     Frequency of Communication with Friends and Family: More than three times a week     Frequency of Social Gatherings with Friends and Family: Once a week     Attends Latter-day Services: Never     Active Member of Clubs or Organizations: No     Attends Club or Organization Meetings: Never     Marital Status: Living with partner   Housing Stability: Unknown (4/21/2024)    Housing Stability Vital Sign     Unable to Pay for Housing in the Last Year: No     Homeless in the Last Year: No       Objective:     Vitals:    04/21/24 1308   BP:    Pulse: 85   Resp:    Temp: 99.1 °F (37.3 °C)       Physical Exam:  Constitutional:  not in acute distress and well developed  HENT: Head: Normal, normocephalic.  Eyes:  scleral icterus  Respiratory: normal chest expansion & respiratory effort and no accessory muscle use  GI: soft, non-tender, without masses or organomegaly  Skin:  jaundiced  Neurological: alert, oriented x3      Significant Labs:  Recent Labs   Lab 04/20/24  1947  04/21/24  0442 04/21/24  1114   HGB 6.2* 8.6* 8.3*       Lab Results   Component Value Date    WBC 10.71 04/21/2024    HGB 8.3 (L) 04/21/2024    HCT 23.2 (L) 04/21/2024     (H) 04/21/2024    PLT 54 (L) 04/21/2024       Lab Results   Component Value Date     (L) 04/21/2024    K 3.6 04/21/2024    CL 81 (L) 04/21/2024    CO2 24 04/21/2024    BUN 16 04/21/2024    CREATININE 1.1 04/21/2024    CALCIUM 7.0 (L) 04/21/2024    ANIONGAP 15 04/21/2024       Lab Results   Component Value Date    ALT 42 04/21/2024     (H) 04/21/2024    ALKPHOS 140 (H) 04/21/2024    BILITOT 23.9 (H) 04/21/2024       Lab Results   Component Value Date    INR 2.3 (H) 04/21/2024    INR 2.2 (H) 04/20/2024    INR 2.5 (H) 04/20/2024       MELD 3.0: 33 at 4/21/2024 11:14 AM  MELD-Na: 33 at 4/21/2024 11:14 AM  Calculated from:  Serum Creatinine: 1.1 mg/dL at 4/21/2024 11:14 AM  Serum Sodium: 120 mmol/L (Using min of 125 mmol/L) at 4/21/2024 11:14 AM  Total Bilirubin: 23.9 mg/dL at 4/21/2024 11:14 AM  Serum Albumin: 2.3 g/dL at 4/21/2024 11:14 AM  INR(ratio): 2.3 at 4/21/2024  4:42 AM  Age at listing (hypothetical): 38 years  Sex: Female at 4/21/2024 11:14 AM        Significant Imaging:  Reviewed pertinent radiology findings.       Assessment/Plan:     Carola Tovar is a 38 y.o. female with history of alcohol use disorder who presents with alcoholic hepatitis superimposed on decompensated cirrhosis. Relapsed last year after 4 year period of sobriety. Did drink despite knowledge of cirrhosis discovered last year after relapse. MELD 33 with significant hyponatremia but fortunately not encephalopathic and with intact renal function.     Concerned for history of relapse and ongoing drinking despite knowledge of liver disease. Pending trend in her MELD, will discuss opening inpatient liver transplant eval. Needs addiction psych consult.     Fluid restriction for hyponatremia.   GI consulted for melena. Will need EGD when  optimized.      Problem List:  Decompensated alcoholic cirrhosis  Alcoholic hepatitis  Melena  Hyponatremia    Recommendations:  - For hyponatremia: no free water. Only milk or boost/ensure  - Addiction psych consult  - Timing of EGD per GI. Continue IV PPI, octreotide, and CTX  - Low salt, high protein diet  - No accessible pocket of ascites on US per primary team. Can hold off on paracentesis.  - Low salt diet  - CIWA protocol  - Lactulose titrated to 2-3 BMs per day  - Will discuss possible inpatient eval pending trend in MELD and addiction psych evaluation    Thank you for involving us in the care of Carola Tovar. Please call with any additional questions, concerns or changes in the patient's clinical status. We will continue to follow.     Acosta Alvarez MD  Gastroenterology & Hepatology Fellow PGY V   [facility]

## 2024-04-21 NOTE — HPI
39 yo F with alcohol abuse disorder who presented to OSH ED with jaundice. Pt is originally from [place], but have spent the last 1.5 months in [place] visiting a friend. For the past month she has started noticing yellow discoloration in her eyes and skin. Pt reports that she used to drink 48 oz of vodka daily, but for the past week she has taper down to only 16 oz per day. Last drink was yesterday 04/19. She also reports multiple episodes of withdrawal in the past, but no seizure episodes. She also report dysuria and increased urinary frequency. Denies headaches, visual changes, SOB, cough, chest pain, palpitation, nausea, vomiting, abdominal pain, diarrhea, constipation.      At the OSH ED the patient was afebrile tachycardic to 120 with /42. Lab work remarkable for Hb/Hct 7.4/21.0, INR 2.57, Na 120, K 2.5, Mg 0.7. Tbili of 24. U/A nitrite positive. Given IV CTX, Mg and K replacements. Hep studies negative. CT A/P shows cirrhosis and portal HTN, gastric diffuse bowel wall thickening and jose-pancreatic fat stranding. Pt was [place] [facility] [place] higher level of care. Pt admitted to hospital medicine for further management of acute on chronic liver failure and hepatology was consulted.     Rapid response activated overnight 04/22 for increasing oxygen requirements.

## 2024-04-21 NOTE — ASSESSMENT & PLAN NOTE
Pt with a hx of alcohol use disorder presenting from OSH for complains of jaundice. She drinks 48 oz of vodka daily, however has been trying to taper down. Has a history of alcohol withdrawal in the past, but no seizures. As reported per transfer note lab work at OSH was remarkable for  Hb/Hct 7.4/21.0, INR 2.57, Na 120, K 2.5, Mg 0.7. Tbili of 24. . Hep studies negative. CT A/P shows cirrhosis and portal HTN, gastric diffuse bowel wall thickening and jose-pancreatic fat stranding. Physical exam remarkable for sclera icterus, jaundice, asterixis and abdominal distention. US abdomen did not showed a viable pocket for paracentesis.     US liver showing ascites, Reversal of flow of the main portal, right portal, and left portal veins, which can be seen with liver disease. Hepatomegaly with heterogeneous echotexture.     CTA negative for any intraabdominal bleeding.  Cirrhotic configuration liver with stigmata of portal venous hypertension and moderate volume abdominopelvic ascites. Relatively diffuse colonic wall thickening with submucosal edema could relate to portal colopathy.    Plan  - Consult hepatology, appreciate recs  - Continue rocephin and vanc   - F/U CBC  - F/U CMP  - PT/INR  - Antimitochrodial antibody  - Anti smooth muscle antibody  - Alpha 1 antitrypsin

## 2024-04-21 NOTE — ASSESSMENT & PLAN NOTE
Patient's anemia is currently uncontrolled. Has not received any PRBCs to date. Etiology likely d/t chronic disease due to Chronic liver disease  Current CBC reviewed-   Lab Results   Component Value Date    HGB 8.3 (L) 04/21/2024    HCT 23.2 (L) 04/21/2024     - s/p 2 PRBC  - Type and screen   - Transfusing one unit of blood  - Monitor serial CBC and transfuse if patient becomes hemodynamically unstable, symptomatic or H/H drops below 7/21.

## 2024-04-21 NOTE — NURSING
Pt transferred off unit via wheelchair. Pt AAOx4 with no c/o pain or nausea. No signs of acute distress noted at this time.

## 2024-04-21 NOTE — ASSESSMENT & PLAN NOTE
Patient with known Cirrhosis with Child's class C. Co-morbidities are present and inclusive of portal hypertension and anemia/pancytopenia.  MELD-Na score calculated; MELD 3.0: 33 at 4/21/2024 11:14 AM  MELD-Na: 33 at 4/21/2024 11:14 AM  Calculated from:  Serum Creatinine: 1.1 mg/dL at 4/21/2024 11:14 AM  Serum Sodium: 120 mmol/L (Using min of 125 mmol/L) at 4/21/2024 11:14 AM  Total Bilirubin: 23.9 mg/dL at 4/21/2024 11:14 AM  Serum Albumin: 2.3 g/dL at 4/21/2024 11:14 AM  INR(ratio): 2.3 at 4/21/2024  4:42 AM  Age at listing (hypothetical): 38 years  Sex: Female at 4/21/2024 11:14 AM      Continue chronic meds. Etiology likely ETOH. Will avoid any hepatotoxic meds, and monitor CBC/CMP/INR for synthetic function.     For plan please see liver failure

## 2024-04-21 NOTE — CLINICAL REVIEW
IP Sepsis Screen (most recent)       Sepsis Screen (IP) - 04/21/24 0741       Is the patient's history or complaint suggestive of a possible infection? Yes  -JM    Are there at least two of the following signs and symptoms present? Yes  -JM    Sepsis signs/symptoms - Tachycardia Tachycardia     >90  -JM    Sepsis signs/symptoms - Tachypnea Tachypnea     >20  -JM    Are any of the following organ dysfunction criteria present and not considered to be due to a chronic condition? Yes  -    Organ Dysfunction Criteria Total Bilirubin > 2.0 Platelet count < 100,000  -    Organ Dysfunction Criteria INR > 1.5 or aPTT > 60  -JM    Initiate Sepsis Protocol No  -JM    Reason sepsis not considered Pt. receiving appropriate management  -              User Key  (r) = Recorded By, (t) = Taken By, (c) = Cosigned By      Initials Name    Joan Dickinson RN

## 2024-04-21 NOTE — CARE UPDATE
"RAPID RESPONSE NURSE PROACTIVE ROUNDING NOTE       Time of Visit: 2300    Admit Date: 2024  LOS: 1  Code Status: Full Code   Date of Visit: 2024  : 1985  Age: 38 y.o.  Sex: female  Race: Unknown  Bed: 8098/8098 A:   MRN: [id number]  Was the patient discharged from an ICU this admission? No   Was the patient discharged from a PACU within last 24 hours? No   Did the patient receive conscious sedation/general anesthesia in last 24 hours? No  Was the patient in the ED within the past 24 hours? No  Was the patient on NIPPV within the past 24 hours? No   Attending Physician: Theresa Alvarez MD  Primary Service: [facility] MED 5   Time spent at the bedside: < 15 min    SITUATION    Notified by charge RN via phone call.  Reason for alert: multi blood and electrolyte infusions.   Called to evaluate the patient for Circulatory    BACKGROUND     Why is the patient in the hospital?: Liver failure without hepatic coma    Patient has no past medical history on file.    Last Vitals:  Temp: 98.7 °F (37.1 °C) (57)  Pulse: 106 (57)  Resp: 21 (57)  BP: 109/53 (57)  SpO2: 93 % (57)    24 Hours Vitals Range:  Temp:  [97.1 °F (36.2 °C)-98.8 °F (37.1 °C)]   Pulse:  [105-124]   Resp:  [16-23]   BP: ()/(50-63)   SpO2:  [92 %-97 %]     Labs:  Recent Labs     24   WBC 12.47 10.71   HGB 6.9* 6.2*   HCT 19.1* 17.5*   PLT 42* 60*       Recent Labs     24   * 122*   K 2.7* 2.9*   CL 77* 78*   CO2 21* 22*   BUN 11 12   CREATININE 0.7 0.8   GLU 89 104   PHOS 3.5  --    MG 1.7  --         No results for input(s): "PH", "PCO2", "PO2", "HCO3", "POCSATURATED", "BE" in the last 72 hours.     ASSESSMENT    Physical Exam    Pt sitting up in bed as primary nurse gets another line of access for multi blood products and electrolyte therapy.     INTERVENTIONS    The patient was seen for medical concern and follow up blood work " ordered.     RECOMMENDATIONS    Continue current POC, reccheck labs after replacements to ensure proper electrolyte therapy.     PROVIDER ESCALATION    Yes/No  No    Orders received and case discussed with NA.    Disposition: Remain in room 8098.    FOLLOW-UP    charge Digna GUY  updated on plan of care. Instructed to call the Rapid Response Nurse, Arabella Pereira RN at [phone] for additional questions or concerns.

## 2024-04-21 NOTE — ASSESSMENT & PLAN NOTE
RESOLVED  Patient has Abnormal Magnesium: hypomagnesemia. Will continue to monitor electrolytes closely. Will replace the affected electrolytes and repeat labs to be done after interventions completed. The patient's magnesium results have been reviewed and are listed below.  Recent Labs   Lab 04/21/24  0443   MG 2.2

## 2024-04-21 NOTE — CARE UPDATE
RAPID RESPONSE NURSE FOLLOW-UP NOTE       Followed up with patient for proactive rounding.  No acute issues at this time. Lactic acid 8.4 down from 11.2  Reviewed plan of care with charge RNDigna .   Team will continue to follow.  Please call Rapid Response RN, Arabella Pereira RN with any questions or concerns at [phone].

## 2024-04-21 NOTE — NURSING
"Pt notified RN that she had voided, but her urine was red with clots noted. Pt stated, "I am not sure if it is all urine because it may be my time of the month." Notified DO Jens of the red urine with clots noted and also notified DO Jens of what pt said about the urine. DO Jens stated not to use the urine for the lab collection. No new orders given. Pt remains in bed with the call light in reach and the bed alarm on.   "

## 2024-04-22 PROBLEM — Z01.818 ENCOUNTER FOR PRE-TRANSPLANT EVALUATION FOR LIVER TRANSPLANT: Status: ACTIVE | Noted: 2024-04-22

## 2024-04-22 PROBLEM — K74.60 DECOMPENSATED HEPATIC CIRRHOSIS: Status: ACTIVE | Noted: 2024-04-20

## 2024-04-22 PROBLEM — N17.9 AKI (ACUTE KIDNEY INJURY): Status: ACTIVE | Noted: 2024-04-22

## 2024-04-22 PROBLEM — J96.01 ACUTE HYPOXEMIC RESPIRATORY FAILURE: Status: ACTIVE | Noted: 2024-04-22

## 2024-04-22 PROBLEM — F10.930 ALCOHOL WITHDRAWAL, UNCOMPLICATED: Status: ACTIVE | Noted: 2024-04-22

## 2024-04-22 PROBLEM — E83.42 HYPOMAGNESEMIA: Status: RESOLVED | Noted: 2024-04-20 | Resolved: 2024-04-22

## 2024-04-22 LAB
ALBUMIN SERPL BCP-MCNC: 2.1 G/DL (ref 3.5–5.2)
ALBUMIN SERPL BCP-MCNC: 2.2 G/DL (ref 3.5–5.2)
ALBUMIN SERPL BCP-MCNC: 2.4 G/DL (ref 3.5–5.2)
ALLENS TEST: ABNORMAL
ALLENS TEST: ABNORMAL
ALP SERPL-CCNC: 123 U/L (ref 55–135)
ALP SERPL-CCNC: 129 U/L (ref 55–135)
ALP SERPL-CCNC: 130 U/L (ref 55–135)
ALP SERPL-CCNC: 132 U/L (ref 55–135)
ALP SERPL-CCNC: 138 U/L (ref 55–135)
ALT SERPL W/O P-5'-P-CCNC: 38 U/L (ref 10–44)
ALT SERPL W/O P-5'-P-CCNC: 40 U/L (ref 10–44)
ALT SERPL W/O P-5'-P-CCNC: 40 U/L (ref 10–44)
ALT SERPL W/O P-5'-P-CCNC: 41 U/L (ref 10–44)
ALT SERPL W/O P-5'-P-CCNC: 41 U/L (ref 10–44)
ANION GAP SERPL CALC-SCNC: 10 MMOL/L (ref 8–16)
ANION GAP SERPL CALC-SCNC: 11 MMOL/L (ref 8–16)
ANION GAP SERPL CALC-SCNC: 12 MMOL/L (ref 8–16)
ANISOCYTOSIS BLD QL SMEAR: SLIGHT
ASCENDING AORTA: 2.69 CM
AST SERPL-CCNC: 105 U/L (ref 10–40)
AST SERPL-CCNC: 109 U/L (ref 10–40)
AST SERPL-CCNC: 110 U/L (ref 10–40)
AST SERPL-CCNC: 115 U/L (ref 10–40)
AST SERPL-CCNC: 118 U/L (ref 10–40)
AV INDEX (PROSTH): 0.92
AV MEAN GRADIENT: 8 MMHG
AV PEAK GRADIENT: 12 MMHG
AV VALVE AREA BY VELOCITY RATIO: 3.07 CM²
AV VALVE AREA: 2.9 CM²
AV VELOCITY RATIO: 0.98
B-HCG UR QL: NEGATIVE
BASO STIPL BLD QL SMEAR: ABNORMAL
BASO STIPL BLD QL SMEAR: ABNORMAL
BASOPHILS # BLD AUTO: 0.05 K/UL (ref 0–0.2)
BASOPHILS # BLD AUTO: 0.06 K/UL (ref 0–0.2)
BASOPHILS # BLD AUTO: 0.07 K/UL (ref 0–0.2)
BASOPHILS NFR BLD: 0.5 % (ref 0–1.9)
BASOPHILS NFR BLD: 0.6 % (ref 0–1.9)
BASOPHILS NFR BLD: 0.8 % (ref 0–1.9)
BILIRUB SERPL-MCNC: 19.6 MG/DL (ref 0.1–1)
BILIRUB SERPL-MCNC: 20.1 MG/DL (ref 0.1–1)
BILIRUB SERPL-MCNC: 20.6 MG/DL (ref 0.1–1)
BILIRUB SERPL-MCNC: 20.6 MG/DL (ref 0.1–1)
BILIRUB SERPL-MCNC: 21.8 MG/DL (ref 0.1–1)
BSA FOR ECHO PROCEDURE: 1.83 M2
BUN SERPL-MCNC: 16 MG/DL (ref 6–20)
BUN SERPL-MCNC: 17 MG/DL (ref 6–20)
BUN SERPL-MCNC: 18 MG/DL (ref 6–20)
BUN SERPL-MCNC: 18 MG/DL (ref 6–20)
BUN SERPL-MCNC: 19 MG/DL (ref 6–20)
BURR CELLS BLD QL SMEAR: ABNORMAL
BURR CELLS BLD QL SMEAR: ABNORMAL
CALCIUM SERPL-MCNC: 7 MG/DL (ref 8.7–10.5)
CALCIUM SERPL-MCNC: 7.2 MG/DL (ref 8.7–10.5)
CALCIUM SERPL-MCNC: 7.4 MG/DL (ref 8.7–10.5)
CALCIUM SERPL-MCNC: 7.5 MG/DL (ref 8.7–10.5)
CALCIUM SERPL-MCNC: 7.7 MG/DL (ref 8.7–10.5)
CHLORIDE SERPL-SCNC: 82 MMOL/L (ref 95–110)
CHLORIDE SERPL-SCNC: 85 MMOL/L (ref 95–110)
CHLORIDE SERPL-SCNC: 86 MMOL/L (ref 95–110)
CHLORIDE SERPL-SCNC: 87 MMOL/L (ref 95–110)
CHLORIDE SERPL-SCNC: 92 MMOL/L (ref 95–110)
CO2 SERPL-SCNC: 26 MMOL/L (ref 23–29)
CO2 SERPL-SCNC: 27 MMOL/L (ref 23–29)
CO2 SERPL-SCNC: 27 MMOL/L (ref 23–29)
CO2 SERPL-SCNC: 28 MMOL/L (ref 23–29)
CO2 SERPL-SCNC: 28 MMOL/L (ref 23–29)
CREAT SERPL-MCNC: 1.2 MG/DL (ref 0.5–1.4)
CREAT SERPL-MCNC: 1.3 MG/DL (ref 0.5–1.4)
CREAT SERPL-MCNC: 1.5 MG/DL (ref 0.5–1.4)
CV ECHO LV RWT: 0.26 CM
DACRYOCYTES BLD QL SMEAR: ABNORMAL
DACRYOCYTES BLD QL SMEAR: ABNORMAL
DELSYS: ABNORMAL
DELSYS: ABNORMAL
DIFFERENTIAL METHOD BLD: ABNORMAL
DOHLE BOD BLD QL SMEAR: PRESENT
DOP CALC AO PEAK VEL: 1.75 M/S
DOP CALC AO VTI: 38.36 CM
DOP CALC LVOT AREA: 3.1 CM2
DOP CALC LVOT DIAMETER: 2 CM
DOP CALC LVOT PEAK VEL: 1.71 M/S
DOP CALC LVOT STROKE VOLUME: 111.41 CM3
DOP CALCLVOT PEAK VEL VTI: 35.48 CM
E WAVE DECELERATION TIME: 124.01 MSEC
E/A RATIO: 1.69
E/E' RATIO: 7.18 M/S
ECHO LV POSTERIOR WALL: 0.82 CM (ref 0.6–1.1)
EJECTION FRACTION: 65 %
EOSINOPHIL # BLD AUTO: 0.1 K/UL (ref 0–0.5)
EOSINOPHIL # BLD AUTO: 0.2 K/UL (ref 0–0.5)
EOSINOPHIL # BLD AUTO: 0.2 K/UL (ref 0–0.5)
EOSINOPHIL NFR BLD: 1.1 % (ref 0–8)
EOSINOPHIL NFR BLD: 1.3 % (ref 0–8)
EOSINOPHIL NFR BLD: 1.6 % (ref 0–8)
EOSINOPHIL NFR BLD: 1.6 % (ref 0–8)
EOSINOPHIL NFR BLD: 1.9 % (ref 0–8)
ERYTHROCYTE [DISTWIDTH] IN BLOOD BY AUTOMATED COUNT: ABNORMAL % (ref 11.5–14.5)
EST. GFR  (NO RACE VARIABLE): 44.9 ML/MIN/1.73 M^2
EST. GFR  (NO RACE VARIABLE): 53.3 ML/MIN/1.73 M^2
EST. GFR  (NO RACE VARIABLE): 58.7 ML/MIN/1.73 M^2
FLOW: 5
FRACTIONAL SHORTENING: 42 % (ref 28–44)
GLUCOSE SERPL-MCNC: 100 MG/DL (ref 70–110)
GLUCOSE SERPL-MCNC: 116 MG/DL (ref 70–110)
GLUCOSE SERPL-MCNC: 119 MG/DL (ref 70–110)
GLUCOSE SERPL-MCNC: 141 MG/DL (ref 70–110)
GLUCOSE SERPL-MCNC: 180 MG/DL (ref 70–110)
HCO3 UR-SCNC: 28.5 MMOL/L (ref 24–28)
HCT VFR BLD AUTO: 20.9 % (ref 37–48.5)
HCT VFR BLD AUTO: 21.2 % (ref 37–48.5)
HCT VFR BLD AUTO: 21.9 % (ref 37–48.5)
HCT VFR BLD AUTO: 23.2 % (ref 37–48.5)
HCT VFR BLD AUTO: 23.4 % (ref 37–48.5)
HCT VFR BLD CALC: 25 %PCV (ref 36–54)
HGB BLD-MCNC: 7.6 G/DL (ref 12–16)
HGB BLD-MCNC: 7.8 G/DL (ref 12–16)
HGB BLD-MCNC: 7.9 G/DL (ref 12–16)
HGB BLD-MCNC: 8.1 G/DL (ref 12–16)
HGB BLD-MCNC: 8.5 G/DL (ref 12–16)
HYPOCHROMIA BLD QL SMEAR: ABNORMAL
IMM GRANULOCYTES # BLD AUTO: 0.09 K/UL (ref 0–0.04)
IMM GRANULOCYTES # BLD AUTO: 0.11 K/UL (ref 0–0.04)
IMM GRANULOCYTES # BLD AUTO: 0.12 K/UL (ref 0–0.04)
IMM GRANULOCYTES # BLD AUTO: 0.14 K/UL (ref 0–0.04)
IMM GRANULOCYTES # BLD AUTO: 0.17 K/UL (ref 0–0.04)
IMM GRANULOCYTES NFR BLD AUTO: 0.8 % (ref 0–0.5)
IMM GRANULOCYTES NFR BLD AUTO: 1.2 % (ref 0–0.5)
IMM GRANULOCYTES NFR BLD AUTO: 1.2 % (ref 0–0.5)
IMM GRANULOCYTES NFR BLD AUTO: 1.6 % (ref 0–0.5)
IMM GRANULOCYTES NFR BLD AUTO: 1.9 % (ref 0–0.5)
INR PPP: 1.9 (ref 0.8–1.2)
INTERVENTRICULAR SEPTUM: 0.67 CM (ref 0.6–1.1)
IVRT: 68.51 MSEC
LA MAJOR: 6.21 CM
LA MINOR: 6.14 CM
LA WIDTH: 4.29 CM
LDH SERPL L TO P-CCNC: 2.64 MMOL/L (ref 0.36–1.25)
LEFT ATRIUM SIZE: 3.89 CM
LEFT ATRIUM VOLUME INDEX MOD: 36.5 ML/M2
LEFT ATRIUM VOLUME INDEX: 48.4 ML/M2
LEFT ATRIUM VOLUME MOD: 66.04 CM3
LEFT ATRIUM VOLUME: 87.59 CM3
LEFT INTERNAL DIMENSION IN SYSTOLE: 3.64 CM (ref 2.1–4)
LEFT VENTRICLE DIASTOLIC VOLUME INDEX: 108.25 ML/M2
LEFT VENTRICLE DIASTOLIC VOLUME: 195.93 ML
LEFT VENTRICLE MASS INDEX: 101 G/M2
LEFT VENTRICLE SYSTOLIC VOLUME INDEX: 30.9 ML/M2
LEFT VENTRICLE SYSTOLIC VOLUME: 55.84 ML
LEFT VENTRICULAR INTERNAL DIMENSION IN DIASTOLE: 6.23 CM (ref 3.5–6)
LEFT VENTRICULAR MASS: 182.22 G
LV LATERAL E/E' RATIO: 6.1 M/S
LV SEPTAL E/E' RATIO: 8.71 M/S
LYMPHOCYTES # BLD AUTO: 0.8 K/UL (ref 1–4.8)
LYMPHOCYTES # BLD AUTO: 0.9 K/UL (ref 1–4.8)
LYMPHOCYTES # BLD AUTO: 0.9 K/UL (ref 1–4.8)
LYMPHOCYTES # BLD AUTO: 1.1 K/UL (ref 1–4.8)
LYMPHOCYTES # BLD AUTO: 1.3 K/UL (ref 1–4.8)
LYMPHOCYTES NFR BLD: 10 % (ref 18–48)
LYMPHOCYTES NFR BLD: 12.2 % (ref 18–48)
LYMPHOCYTES NFR BLD: 8.2 % (ref 18–48)
LYMPHOCYTES NFR BLD: 9.7 % (ref 18–48)
LYMPHOCYTES NFR BLD: 9.8 % (ref 18–48)
MAGNESIUM SERPL-MCNC: 2.1 MG/DL (ref 1.6–2.6)
MCH RBC QN AUTO: 38 PG (ref 27–31)
MCH RBC QN AUTO: 38.8 PG (ref 27–31)
MCH RBC QN AUTO: 39.1 PG (ref 27–31)
MCH RBC QN AUTO: 39.7 PG (ref 27–31)
MCH RBC QN AUTO: 40.2 PG (ref 27–31)
MCHC RBC AUTO-ENTMCNC: 34.9 G/DL (ref 32–36)
MCHC RBC AUTO-ENTMCNC: 36.1 G/DL (ref 32–36)
MCHC RBC AUTO-ENTMCNC: 36.3 G/DL (ref 32–36)
MCHC RBC AUTO-ENTMCNC: 36.4 G/DL (ref 32–36)
MCHC RBC AUTO-ENTMCNC: 36.8 G/DL (ref 32–36)
MCV RBC AUTO: 105 FL (ref 82–98)
MCV RBC AUTO: 108 FL (ref 82–98)
MCV RBC AUTO: 109 FL (ref 82–98)
MCV RBC AUTO: 109 FL (ref 82–98)
MCV RBC AUTO: 111 FL (ref 82–98)
MODE: ABNORMAL
MODE: ABNORMAL
MONOCYTES # BLD AUTO: 1.4 K/UL (ref 0.3–1)
MONOCYTES # BLD AUTO: 1.4 K/UL (ref 0.3–1)
MONOCYTES # BLD AUTO: 1.8 K/UL (ref 0.3–1)
MONOCYTES # BLD AUTO: 2 K/UL (ref 0.3–1)
MONOCYTES # BLD AUTO: 2.1 K/UL (ref 0.3–1)
MONOCYTES NFR BLD: 15.9 % (ref 4–15)
MONOCYTES NFR BLD: 16 % (ref 4–15)
MONOCYTES NFR BLD: 18.2 % (ref 4–15)
MONOCYTES NFR BLD: 19.5 % (ref 4–15)
MONOCYTES NFR BLD: 19.8 % (ref 4–15)
MV PEAK A VEL: 0.72 M/S
MV PEAK E VEL: 1.22 M/S
MV STENOSIS PRESSURE HALF TIME: 35.96 MS
MV VALVE AREA P 1/2 METHOD: 6.12 CM2
NEUTROPHILS # BLD AUTO: 6.2 K/UL (ref 1.8–7.7)
NEUTROPHILS # BLD AUTO: 6.2 K/UL (ref 1.8–7.7)
NEUTROPHILS # BLD AUTO: 6.4 K/UL (ref 1.8–7.7)
NEUTROPHILS # BLD AUTO: 6.6 K/UL (ref 1.8–7.7)
NEUTROPHILS # BLD AUTO: 7.9 K/UL (ref 1.8–7.7)
NEUTROPHILS NFR BLD: 64.6 % (ref 38–73)
NEUTROPHILS NFR BLD: 69.1 % (ref 38–73)
NEUTROPHILS NFR BLD: 69.2 % (ref 38–73)
NEUTROPHILS NFR BLD: 70 % (ref 38–73)
NEUTROPHILS NFR BLD: 70.5 % (ref 38–73)
NRBC BLD-RTO: 1 /100 WBC
OHS CV RV/LV RATIO: 0.49 CM
OVALOCYTES BLD QL SMEAR: ABNORMAL
PCO2 BLDA: 34.2 MMHG (ref 35–45)
PH SMN: 7.53 [PH] (ref 7.35–7.45)
PHOSPHATE SERPL-MCNC: 2.2 MG/DL (ref 2.7–4.5)
PISA TR MAX VEL: 2.82 M/S
PLATELET # BLD AUTO: 44 K/UL (ref 150–450)
PLATELET # BLD AUTO: 49 K/UL (ref 150–450)
PLATELET # BLD AUTO: 55 K/UL (ref 150–450)
PLATELET # BLD AUTO: 55 K/UL (ref 150–450)
PLATELET # BLD AUTO: 57 K/UL (ref 150–450)
PLATELET BLD QL SMEAR: ABNORMAL
PMV BLD AUTO: 10.2 FL (ref 9.2–12.9)
PMV BLD AUTO: 10.4 FL (ref 9.2–12.9)
PMV BLD AUTO: 10.4 FL (ref 9.2–12.9)
PMV BLD AUTO: 10.6 FL (ref 9.2–12.9)
PMV BLD AUTO: 11.3 FL (ref 9.2–12.9)
PO2 BLDA: 59 MMHG (ref 80–100)
POC BE: 6 MMOL/L
POC IONIZED CALCIUM: 1.05 MMOL/L (ref 1.06–1.42)
POC SATURATED O2: 93 % (ref 95–100)
POC TCO2: 30 MMOL/L (ref 23–27)
POCT GLUCOSE: 103 MG/DL (ref 70–110)
POIKILOCYTOSIS BLD QL SMEAR: SLIGHT
POLYCHROMASIA BLD QL SMEAR: ABNORMAL
POTASSIUM BLD-SCNC: 3.3 MMOL/L (ref 3.5–5.1)
POTASSIUM SERPL-SCNC: 2.8 MMOL/L (ref 3.5–5.1)
POTASSIUM SERPL-SCNC: 3 MMOL/L (ref 3.5–5.1)
POTASSIUM SERPL-SCNC: 3.1 MMOL/L (ref 3.5–5.1)
POTASSIUM SERPL-SCNC: 3.3 MMOL/L (ref 3.5–5.1)
POTASSIUM SERPL-SCNC: 3.5 MMOL/L (ref 3.5–5.1)
PROT SERPL-MCNC: 4.8 G/DL (ref 6–8.4)
PROT SERPL-MCNC: 4.8 G/DL (ref 6–8.4)
PROT SERPL-MCNC: 4.9 G/DL (ref 6–8.4)
PROT SERPL-MCNC: 5.1 G/DL (ref 6–8.4)
PROT SERPL-MCNC: 5.2 G/DL (ref 6–8.4)
PROTHROMBIN TIME: 19.5 SEC (ref 9–12.5)
RA MAJOR: 4.63 CM
RA PRESSURE ESTIMATED: 8 MMHG
RA WIDTH: 4.13 CM
RBC # BLD AUTO: 1.94 M/UL (ref 4–5.4)
RBC # BLD AUTO: 2 M/UL (ref 4–5.4)
RBC # BLD AUTO: 2.02 M/UL (ref 4–5.4)
RBC # BLD AUTO: 2.09 M/UL (ref 4–5.4)
RBC # BLD AUTO: 2.14 M/UL (ref 4–5.4)
RIGHT VENTRICULAR END-DIASTOLIC DIMENSION: 3.03 CM
RV TB RVSP: 11 MMHG
SAMPLE: ABNORMAL
SAMPLE: ABNORMAL
SCHISTOCYTES BLD QL SMEAR: ABNORMAL
SINUS: 3.38 CM
SITE: ABNORMAL
SITE: ABNORMAL
SODIUM BLD-SCNC: 127 MMOL/L (ref 136–145)
SODIUM SERPL-SCNC: 120 MMOL/L (ref 136–145)
SODIUM SERPL-SCNC: 123 MMOL/L (ref 136–145)
SODIUM SERPL-SCNC: 124 MMOL/L (ref 136–145)
SODIUM SERPL-SCNC: 125 MMOL/L (ref 136–145)
SODIUM SERPL-SCNC: 129 MMOL/L (ref 136–145)
SPHEROCYTES BLD QL SMEAR: ABNORMAL
SPHEROCYTES BLD QL SMEAR: ABNORMAL
STJ: 2.65 CM
TARGETS BLD QL SMEAR: ABNORMAL
TDI LATERAL: 0.2 M/S
TDI SEPTAL: 0.14 M/S
TDI: 0.17 M/S
TOXIC GRANULES BLD QL SMEAR: PRESENT
TOXIC GRANULES BLD QL SMEAR: PRESENT
TR MAX PG: 32 MMHG
TRICUSPID ANNULAR PLANE SYSTOLIC EXCURSION: 3.47 CM
TV REST PULMONARY ARTERY PRESSURE: 40 MMHG
VANCOMYCIN TROUGH SERPL-MCNC: 30.3 UG/ML (ref 10–22)
WBC # BLD AUTO: 10.23 K/UL (ref 3.9–12.7)
WBC # BLD AUTO: 11.42 K/UL (ref 3.9–12.7)
WBC # BLD AUTO: 8.8 K/UL (ref 3.9–12.7)
WBC # BLD AUTO: 8.9 K/UL (ref 3.9–12.7)
WBC # BLD AUTO: 9.26 K/UL (ref 3.9–12.7)
Z-SCORE OF LEFT VENTRICULAR DIMENSION IN END DIASTOLE: 2.18
Z-SCORE OF LEFT VENTRICULAR DIMENSION IN END SYSTOLE: 1.29

## 2024-04-22 PROCEDURE — 90792 PSYCH DIAG EVAL W/MED SRVCS: CPT | Mod: ,,, | Performed by: PSYCHIATRY & NEUROLOGY

## 2024-04-22 PROCEDURE — 25000003 PHARM REV CODE 250

## 2024-04-22 PROCEDURE — 80202 ASSAY OF VANCOMYCIN: CPT | Performed by: STUDENT IN AN ORGANIZED HEALTH CARE EDUCATION/TRAINING PROGRAM

## 2024-04-22 PROCEDURE — 80053 COMPREHEN METABOLIC PANEL: CPT

## 2024-04-22 PROCEDURE — 63600175 PHARM REV CODE 636 W HCPCS: Mod: JA

## 2024-04-22 PROCEDURE — 83735 ASSAY OF MAGNESIUM: CPT

## 2024-04-22 PROCEDURE — 83605 ASSAY OF LACTIC ACID: CPT

## 2024-04-22 PROCEDURE — 99900035 HC TECH TIME PER 15 MIN (STAT)

## 2024-04-22 PROCEDURE — 0241U SARS-COV2 (COVID) WITH FLU/RSV BY PCR: CPT

## 2024-04-22 PROCEDURE — 81025 URINE PREGNANCY TEST: CPT

## 2024-04-22 PROCEDURE — 63600175 PHARM REV CODE 636 W HCPCS

## 2024-04-22 PROCEDURE — 82803 BLOOD GASES ANY COMBINATION: CPT

## 2024-04-22 PROCEDURE — 82330 ASSAY OF CALCIUM: CPT

## 2024-04-22 PROCEDURE — 63600175 PHARM REV CODE 636 W HCPCS: Performed by: STUDENT IN AN ORGANIZED HEALTH CARE EDUCATION/TRAINING PROGRAM

## 2024-04-22 PROCEDURE — 94799 UNLISTED PULMONARY SVC/PX: CPT

## 2024-04-22 PROCEDURE — 85025 COMPLETE CBC W/AUTO DIFF WBC: CPT | Mod: 91 | Performed by: STUDENT IN AN ORGANIZED HEALTH CARE EDUCATION/TRAINING PROGRAM

## 2024-04-22 PROCEDURE — 84132 ASSAY OF SERUM POTASSIUM: CPT

## 2024-04-22 PROCEDURE — 80053 COMPREHEN METABOLIC PANEL: CPT | Mod: 91

## 2024-04-22 PROCEDURE — 25000003 PHARM REV CODE 250: Performed by: STUDENT IN AN ORGANIZED HEALTH CARE EDUCATION/TRAINING PROGRAM

## 2024-04-22 PROCEDURE — C9113 INJ PANTOPRAZOLE SODIUM, VIA: HCPCS

## 2024-04-22 PROCEDURE — 85014 HEMATOCRIT: CPT

## 2024-04-22 PROCEDURE — 27100171 HC OXYGEN HIGH FLOW UP TO 24 HOURS

## 2024-04-22 PROCEDURE — 36600 WITHDRAWAL OF ARTERIAL BLOOD: CPT

## 2024-04-22 PROCEDURE — 84100 ASSAY OF PHOSPHORUS: CPT

## 2024-04-22 PROCEDURE — 20600001 HC STEP DOWN PRIVATE ROOM

## 2024-04-22 PROCEDURE — 94761 N-INVAS EAR/PLS OXIMETRY MLT: CPT | Mod: XB

## 2024-04-22 PROCEDURE — 84295 ASSAY OF SERUM SODIUM: CPT

## 2024-04-22 PROCEDURE — 99223 1ST HOSP IP/OBS HIGH 75: CPT | Mod: ,,, | Performed by: INTERNAL MEDICINE

## 2024-04-22 PROCEDURE — 85610 PROTHROMBIN TIME: CPT | Performed by: STUDENT IN AN ORGANIZED HEALTH CARE EDUCATION/TRAINING PROGRAM

## 2024-04-22 PROCEDURE — P9047 ALBUMIN (HUMAN), 25%, 50ML: HCPCS | Mod: JZ,JG | Performed by: STUDENT IN AN ORGANIZED HEALTH CARE EDUCATION/TRAINING PROGRAM

## 2024-04-22 PROCEDURE — 36415 COLL VENOUS BLD VENIPUNCTURE: CPT | Mod: XB

## 2024-04-22 PROCEDURE — 36415 COLL VENOUS BLD VENIPUNCTURE: CPT | Performed by: STUDENT IN AN ORGANIZED HEALTH CARE EDUCATION/TRAINING PROGRAM

## 2024-04-22 RX ORDER — FUROSEMIDE 10 MG/ML
40 INJECTION INTRAMUSCULAR; INTRAVENOUS ONCE
Status: COMPLETED | OUTPATIENT
Start: 2024-04-22 | End: 2024-04-22

## 2024-04-22 RX ORDER — SODIUM,POTASSIUM PHOSPHATES 280-250MG
2 POWDER IN PACKET (EA) ORAL
Status: COMPLETED | OUTPATIENT
Start: 2024-04-22 | End: 2024-04-23

## 2024-04-22 RX ORDER — POTASSIUM CHLORIDE 20 MEQ/1
40 TABLET, EXTENDED RELEASE ORAL EVERY 4 HOURS
Status: DISPENSED | OUTPATIENT
Start: 2024-04-22 | End: 2024-04-22

## 2024-04-22 RX ORDER — LORAZEPAM 1 MG/1
2 TABLET ORAL EVERY 4 HOURS PRN
Status: DISCONTINUED | OUTPATIENT
Start: 2024-04-22 | End: 2024-04-29

## 2024-04-22 RX ORDER — ALBUMIN HUMAN 250 G/1000ML
25 SOLUTION INTRAVENOUS 2 TIMES DAILY
Status: DISCONTINUED | OUTPATIENT
Start: 2024-04-22 | End: 2024-04-23

## 2024-04-22 RX ORDER — MIDODRINE HYDROCHLORIDE 5 MG/1
5 TABLET ORAL 3 TIMES DAILY
Status: DISCONTINUED | OUTPATIENT
Start: 2024-04-22 | End: 2024-04-22

## 2024-04-22 RX ORDER — MIDODRINE HYDROCHLORIDE 5 MG/1
10 TABLET ORAL 3 TIMES DAILY
Status: DISCONTINUED | OUTPATIENT
Start: 2024-04-22 | End: 2024-05-02 | Stop reason: HOSPADM

## 2024-04-22 RX ORDER — POTASSIUM CHLORIDE 20 MEQ/1
40 TABLET, EXTENDED RELEASE ORAL ONCE
Status: COMPLETED | OUTPATIENT
Start: 2024-04-22 | End: 2024-04-22

## 2024-04-22 RX ORDER — SODIUM,POTASSIUM PHOSPHATES 280-250MG
2 POWDER IN PACKET (EA) ORAL
Status: DISCONTINUED | OUTPATIENT
Start: 2024-04-22 | End: 2024-04-22

## 2024-04-22 RX ORDER — LACTULOSE 10 G/15ML
15 SOLUTION ORAL 3 TIMES DAILY
Status: DISCONTINUED | OUTPATIENT
Start: 2024-04-22 | End: 2024-05-02 | Stop reason: HOSPADM

## 2024-04-22 RX ADMIN — ALBUMIN (HUMAN) 25 G: 12.5 SOLUTION INTRAVENOUS at 09:04

## 2024-04-22 RX ADMIN — MIDODRINE HYDROCHLORIDE 10 MG: 5 TABLET ORAL at 08:04

## 2024-04-22 RX ADMIN — FOLIC ACID 1 MG: 1 TABLET ORAL at 08:04

## 2024-04-22 RX ADMIN — MUPIROCIN: 20 OINTMENT TOPICAL at 08:04

## 2024-04-22 RX ADMIN — LACTULOSE 15 G: 20 SOLUTION ORAL at 08:04

## 2024-04-22 RX ADMIN — FUROSEMIDE 40 MG: 10 INJECTION, SOLUTION INTRAVENOUS at 08:04

## 2024-04-22 RX ADMIN — PANTOPRAZOLE SODIUM 40 MG: 40 INJECTION, POWDER, FOR SOLUTION INTRAVENOUS at 08:04

## 2024-04-22 RX ADMIN — POTASSIUM CHLORIDE 40 MEQ: 1500 TABLET, EXTENDED RELEASE ORAL at 05:04

## 2024-04-22 RX ADMIN — LORAZEPAM 2 MG: 1 TABLET ORAL at 11:04

## 2024-04-22 RX ADMIN — POTASSIUM & SODIUM PHOSPHATES POWDER PACK 280-160-250 MG 2 PACKET: 280-160-250 PACK at 08:04

## 2024-04-22 RX ADMIN — PHYTONADIONE 10 MG: 10 INJECTION, EMULSION INTRAMUSCULAR; INTRAVENOUS; SUBCUTANEOUS at 09:04

## 2024-04-22 RX ADMIN — POTASSIUM CHLORIDE 40 MEQ: 1500 TABLET, EXTENDED RELEASE ORAL at 08:04

## 2024-04-22 RX ADMIN — CEFTRIAXONE 2 G: 2 INJECTION, POWDER, FOR SOLUTION INTRAMUSCULAR; INTRAVENOUS at 01:04

## 2024-04-22 RX ADMIN — LACTULOSE 15 G: 20 SOLUTION ORAL at 05:04

## 2024-04-22 RX ADMIN — OCTREOTIDE ACETATE 50 MCG/HR: 500 INJECTION, SOLUTION INTRAVENOUS; SUBCUTANEOUS at 06:04

## 2024-04-22 RX ADMIN — OCTREOTIDE ACETATE 50 MCG/HR: 500 INJECTION, SOLUTION INTRAVENOUS; SUBCUTANEOUS at 08:04

## 2024-04-22 RX ADMIN — THERA TABS 1 TABLET: TAB at 08:04

## 2024-04-22 RX ADMIN — THIAMINE HCL TAB 100 MG 100 MG: 100 TAB at 08:04

## 2024-04-22 RX ADMIN — MIDODRINE HYDROCHLORIDE 10 MG: 5 TABLET ORAL at 04:04

## 2024-04-22 RX ADMIN — POTASSIUM CHLORIDE 40 MEQ: 1500 TABLET, EXTENDED RELEASE ORAL at 04:04

## 2024-04-22 NOTE — ASSESSMENT & PLAN NOTE
Pt with a hx of alcohol use disorder presenting from OSH for complains of jaundice. She drinks 48 oz of vodka daily, however has been trying to taper down. Has a history of alcohol withdrawal in the past, but no seizures. As reported per transfer note lab work at OSH was remarkable for  Hb/Hct 7.4/21.0, INR 2.57, Na 120, K 2.5, Mg 0.7. Tbili of 24. . Hep studies negative. CT A/P shows cirrhosis and portal HTN, gastric diffuse bowel wall thickening and jose-pancreatic fat stranding. Physical exam remarkable for sclera icterus, jaundice, asterixis and abdominal distention. US abdomen did not showed a viable pocket for paracentesis.   - US liver showing ascites, Reversal of flow of the main portal, right portal, and left portal veins, which can be seen with liver disease. Hepatomegaly with heterogeneous echotexture.   - CTA negative for any intraabdominal bleeding.  Cirrhotic configuration liver with stigmata of portal venous hypertension and moderate volume abdominopelvic ascites. Relatively diffuse colonic wall thickening with submucosal edema could relate to portal colopathy.  - Hepatology consulted. Not a candidate for liver transplant at this time given active alcohol use    Plan  - Continue CTX for SBP PPX  - F/u PeTH, anti sm muscle, anti mitochondrial abs   - Continue lactulose for goal 3-4 BMs per day   - No pocket for paracentesis   - Addiction psych consulted

## 2024-04-22 NOTE — PROGRESS NOTES
[facility] - Telemetry [facility] Medicine  Progress Note    Patient Name: Carola Tovar  MRN: [id number]  Patient Class: IP- Inpatient   Admission Date: 4/20/2024  Length of Stay: 2 days  Attending Physician: Theresa Alvarez MD  Primary Care Provider: Roro, Primary Doctor        Subjective:     Principal Problem:Decompensated hepatic cirrhosis        HPI:  Ms. Tovar is a 37 yo F with alcohol abuse disorder who presented to OSH ED with jaundice. Pt is originally from [place], but have spent the last 1.5 months in [place] visiting a friend. For the past month she has started noticing yellow discoloration in her eyes and skin. Pt reports that she used to drink 48 oz of vodka daily, but for the past week she has taper down to only 16 oz per day. Last drink was yesterday 04/19. She also reports multiple episodes of withdrawal in the past, but no seizure episodes. She also report dysuria and increased urinary frequency. Denies headaches, visual changes, SOB, cough, chest pain, palpitation, nausea, vomiting, abdominal pain, diarrhea, constipation.     At the OSH ED the patient was afebrile tachycardic to 120 with /42. Lab work remarkable for Hb/Hct 7.4/21.0, INR 2.57, Na 120, K 2.5, Mg 0.7. Tbili of 24. U/A nitrite positive. Given IV CTX, Mg and K replacements. Hep studies negative. CT A/P shows cirrhosis and portal HTN, gastric diffuse bowel wall thickening and jose-pancreatic fat stranding. Pt was [place] [facility] [place] higher level of care. Pt admitted to hospital medicine for further management of acute on chronic liver failure and hepatology was consulted.     Overview/Hospital Course:  Pt admitted to hospital medicine for acute on chronic liver failure. GI and Hepatology consulted. Hgb 6.9 pt received 2 units of PRBC. Plts low and elevated INR and PT, cryo, plts and vit k given. Low calcium, calcium gluconate given. US liver showing ascites, Reversal of flow of the main portal, right portal, and left  portal veins, which can be seen with liver disease. Hepatomegaly with heterogeneous echotexture. CTA negative for any intraabdominal bleeding.  Cirrhotic configuration liver with stigmata of portal venous hypertension and moderate volume abdominopelvic ascites. Relatively diffuse colonic wall thickening with submucosal edema could relate to portal colopathy. Sodium trending down to 118, pt AOX4. Critical care consulted, pt stable to continue under hospital medicine services. Initiated on octreotide, midodrine, and albumin. Hepatology consulted for transplant evaluation, deemed not a candidate. Treated with prn ativan for alcohol withdraws. Developed worsening PB concerning for HRS, nephrology consulted    Interval History: Pt appears ill this morning. States she is feeling very confused, and is tearful on exam. A&O x 4. Is not in pain. Has been receiving prn Ativan. Nephrology consulted per hepatology recommendations    Review of Systems  Objective:     Vital Signs (Most Recent):  Temp: 98.8 °F (37.1 °C) (04/22/24 1156)  Pulse: 93 (04/22/24 1156)  Resp: 17 (04/22/24 1156)  BP: (!) 93/50 (04/22/24 1156)  SpO2: (!) 88 % (04/22/24 1156) Vital Signs (24h Range):  Temp:  [98.4 °F (36.9 °C)-99.8 °F (37.7 °C)] 98.8 °F (37.1 °C)  Pulse:  [] 93  Resp:  [17-19] 17  SpO2:  [83 %-97 %] 88 %  BP: ()/(43-66) 93/50     Weight: 71.7 kg (158 lb)  Body mass index is 25.5 kg/m².    Intake/Output Summary (Last 24 hours) at 4/22/2024 1406  Last data filed at 4/22/2024 0532  Gross per 24 hour   Intake 1137.83 ml   Output 150 ml   Net 987.83 ml         Physical Exam  Vitals reviewed.   Constitutional:       General: She is not in acute distress.  HENT:      Head: Normocephalic and atraumatic.   Eyes:      General: Scleral icterus present.      Extraocular Movements: Extraocular movements intact.      Pupils: Pupils are equal, round, and reactive to light.   Cardiovascular:      Rate and Rhythm: Normal rate and regular rhythm.       Heart sounds: No murmur heard.  Pulmonary:      Effort: Pulmonary effort is normal. No respiratory distress.      Breath sounds: No wheezing or rales.   Abdominal:      General: Abdomen is flat. There is distension.      Palpations: Abdomen is soft.      Tenderness: There is no abdominal tenderness.   Musculoskeletal:         General: No swelling or tenderness. Normal range of motion.      Cervical back: Normal range of motion.      Right lower leg: No edema.      Left lower leg: No edema.   Skin:     General: Skin is warm and dry.      Coloration: Skin is jaundiced.      Findings: Bruising present.      Comments: Spider angioma in the chest    Neurological:      General: No focal deficit present.      Mental Status: She is alert and oriented to person, place, and time.      Comments: Asterixis   Psychiatric:      Comments: Tearful             Significant Labs: All pertinent labs within the past 24 hours have been reviewed.    Significant Imaging: I have reviewed all pertinent imaging results/findings within the past 24 hours.    Assessment/Plan:      * Decompensated hepatic cirrhosis  Pt with a hx of alcohol use disorder presenting from OSH for complains of jaundice. She drinks 48 oz of vodka daily, however has been trying to taper down. Has a history of alcohol withdrawal in the past, but no seizures. As reported per transfer note lab work at OSH was remarkable for  Hb/Hct 7.4/21.0, INR 2.57, Na 120, K 2.5, Mg 0.7. Tbili of 24. . Hep studies negative. CT A/P shows cirrhosis and portal HTN, gastric diffuse bowel wall thickening and jose-pancreatic fat stranding. Physical exam remarkable for sclera icterus, jaundice, asterixis and abdominal distention. US abdomen did not showed a viable pocket for paracentesis.   - US liver showing ascites, Reversal of flow of the main portal, right portal, and left portal veins, which can be seen with liver disease. Hepatomegaly with heterogeneous echotexture.   - CTA negative  for any intraabdominal bleeding.  Cirrhotic configuration liver with stigmata of portal venous hypertension and moderate volume abdominopelvic ascites. Relatively diffuse colonic wall thickening with submucosal edema could relate to portal colopathy.  - Hepatology consulted. Not a candidate for liver transplant at this time given active alcohol use    Plan  - Continue CTX for SBP PPX  - F/u PeTH, anti sm muscle, anti mitochondrial abs   - Continue lactulose for goal 3-4 BMs per day   - No pocket for paracentesis   - Addiction psych consulted      UGIB (upper gastrointestinal bleed)  Pt presenting to [facility] [place] [place] for acute on chronic liver failure. Has a history of alcohol use disorder. Physical exam remarkable for right CVA tenderness, jaundice, sclera icterus. Reports having melena on her stool. Hgb 6.9 and plts 42.     CTA negative for any intraabdominal bleeding.  Cirrhotic configuration liver with stigmata of portal venous hypertension and moderate volume abdominopelvic ascites. Relatively diffuse colonic wall thickening with submucosal edema could relate to portal colopathy.    Plan  - GI consulted, appreciate recs  - Trend Hgb q6hrs. Transfuse for Hgb <7, unless otherwise indicated  - Maintain IV access with 2 large bore Ivs  - Intravascular resuscitation/support with IVFs   - Hold all NSAIDs and anticoagulants, unless contraindicated  - Bolus IV pantoprazole 80mg followed by 40mg BID  - Please correct any coagulopathy with platelets and FFP for goal of platelets >50K and INR <2.0  - Please notify GI team if there is significant change in patient's clinical status      Acute cystitis  Pt presenting from OSH for concerns of liver failure. Pt reports dysuria and urinary frequency. UA at OSH remarkable for nitrite positive. Given IV CTX.    Plan  - UA and urine culture  - continue rocephin       Acute blood loss anemia  Patient's anemia is currently uncontrolled. Has not received any PRBCs to date. Etiology likely d/t  chronic disease due to Chronic liver disease  Current CBC reviewed-   Lab Results   Component Value Date    HGB 7.6 (L) 04/22/2024    HCT 20.9 (L) 04/22/2024     - s/p 2 PRBC  - Type and screen   - Transfusing one unit of blood  - Monitor serial CBC and transfuse if patient becomes hemodynamically unstable, symptomatic or H/H drops below 7/21.      Alcoholic cirrhosis  Patient with known Cirrhosis with Child's class C. Co-morbidities are present and inclusive of portal hypertension and anemia/pancytopenia.  MELD-Na score calculated; MELD 3.0: 34 at 4/22/2024 12:13 PM  MELD-Na: 33 at 4/22/2024 12:13 PM  Calculated from:  Serum Creatinine: 1.5 mg/dL at 4/22/2024 12:13 PM  Serum Sodium: 124 mmol/L (Using min of 125 mmol/L) at 4/22/2024 12:13 PM  Total Bilirubin: 20.1 mg/dL at 4/22/2024 12:13 PM  Serum Albumin: 2.4 g/dL at 4/22/2024 12:13 PM  INR(ratio): 1.9 at 4/22/2024  8:54 AM  Age at listing (hypothetical): 38 years  Sex: Female at 4/22/2024 12:13 PM      Continue chronic meds. Etiology likely ETOH. Will avoid any hepatotoxic meds, and monitor CBC/CMP/INR for synthetic function.     For plan please see liver failure    Coagulopathy  Pt with hx of alcohol use disorder and recently diagnosed alcohol liver cirrhosis. Lab work remarkable for elevated INR and Plts 42, PT 25.8 INR 2.5 Fibrinogen 105.  - S/p 2 units prbc, 1 unit plts, and 1 unit cryo on admission    Plan  - Plts goal > 50K  - CBC q6 hrs  - Daily PT/INR    Hyponatremia  Patient has hyponatremia which is uncontrolled,We will aim to correct the sodium by 4-6mEq in 24 hours. We will monitor sodium Daily. The hyponatremia is due to Cirrhosis. We will obtain the following studies: Urine sodium, urine osmolality, serum osmolality. We will treat the hyponatremia with Fluid restriction of:  1.5 liter per day. The patient's sodium results have been reviewed and are listed below.  Recent Labs   Lab 04/22/24  1213   *     Improving with fluid restriction and low  sodium diet.   Midodrine, octreotide, albumin infusion   - F/u nephrology recommendations    Alcohol use disorder  Pt with hx of alcohol use disorder presenting [place] [facility] with acute on chronic liver failure. Reports last drink was on 04/19/24. Also has a hx of alcohol withdrawal with no seizure activity.     Alcohol Withdrawal precautions:  - Vitals q4h while awake  - CIWA monitoring  - Lorazepam PRN if 2 criteria are met: Systolic BP>160, Diastolic BP >110 or Pulse >110  - Start Vitamin supplementation- Thiamine, Folic acid, Vit. B12, and Multivitamin qd        VTE Risk Mitigation (From admission, onward)           Ordered     IP VTE LOW RISK PATIENT  Once         04/20/24 1347     Place sequential compression device  Until discontinued         04/20/24 1347                    Discharge Planning   KARSON: 4/30/2024     Code Status: Full Code   Is the patient medically ready for discharge?:     Reason for patient still in hospital (select all that apply): Patient trending condition  Discharge Plan A: Home with family                  Yessica Garcia DO  Department of Hospital Medicine   [facility] - Telemetry Stepdown

## 2024-04-22 NOTE — ASSESSMENT & PLAN NOTE
[place] with hx of alcohol use disorder [place] [facility] [place]. Reports last drink was on 04/19/24. Also has a hx of alcohol withdrawal with no seizure activity.     Alcohol Withdrawal precautions:  - Vitals q4h while awake  - CIWA monitoring  - Lorazepam PRN if 2 criteria are met: Systolic BP>160, Diastolic BP >110 or Pulse >110  - Start Vitamin supplementation- Thiamine, Folic acid, Vit. B12, and Multivitamin qd

## 2024-04-22 NOTE — ASSESSMENT & PLAN NOTE
Patient has hyponatremia which is uncontrolled,We will aim to correct the sodium by 4-6mEq in 24 hours. We will monitor sodium Daily. The hyponatremia is due to Cirrhosis. We will obtain the following studies: Urine sodium, urine osmolality, serum osmolality. We will treat the hyponatremia with Fluid restriction of:  1.5 liter per day. The patient's sodium results have been reviewed and are listed below.  Recent Labs   Lab 04/22/24  1213   *     Improving with fluid restriction and low sodium diet.   Midodrine, octreotide, albumin infusion   - F/u nephrology recommendations

## 2024-04-22 NOTE — ASSESSMENT & PLAN NOTE
Patient's anemia is currently uncontrolled. Has not received any PRBCs to date. Etiology likely d/t chronic disease due to Chronic liver disease  Current CBC reviewed-   Lab Results   Component Value Date    HGB 7.6 (L) 04/22/2024    HCT 20.9 (L) 04/22/2024     - s/p 2 PRBC  - Type and screen   - Transfusing one unit of blood  - Monitor serial CBC and transfuse if patient becomes hemodynamically unstable, symptomatic or H/H drops below 7/21.

## 2024-04-22 NOTE — CONSULTS
[facility] - Telemetry Stepdown  Nephrology  Consult Note    Patient Name: Carola Tovar  MRN: [id number]  Admission Date: 4/20/2024  Hospital Length of Stay: 2 days  Attending Provider: Theresa Alvarez MD   Primary Care Physician: Roro, Primary Doctor  Principal Problem:Decompensated hepatic cirrhosis    Inpatient consult to Nephrology  Consult performed by: Jose Eduardo Olsen MD  Consult ordered by: Yessica Garcia DO        Subjective:     HPI: The patient is a 38 year old female with a history of alcohol use disorder who presented to an OSH in [place] with jaundice and scleral icterus. She consumed about 48 oz of vodka daily, decreased to 16 oz per day. Her last drink was 4/19/2024. At the OSH she was found to be in acute liver failure. She was transferred to [facility] [place] for evaluation for liver transplant. She was admitted to hospital medicine. Her UA was concerning for 1+ protein, 3+ blood, 2+ bilirubin, > 100 RBC, 78 WBC. She was started on Vancomycin + ceftriaxone and found to have an elevated vanc trough on 4/22 to 30.3. Additionally she is being evaluated by GI given concern for upper GI bleed due to reported melena. CTA showing: Geographic areas of hypoattenuation involving the left greater than right kidneys on nonenhanced/precontrast series, nonspecific. Findings are nonspecific and of uncertain etiology, could reflect sequela of concentrated urine or metabolic derangement. Nephrology was consulted 4/22 due to up-trending creatinine and persistent hyponatremia. Concern for HRS vs alternative etiology of PB.    The patient reports recent NSAID use. She states that she had dysuria and urinary frequency prior to admission but clarifies that the volume of her urine also decreased during the days prior to admission. The urine became dark in color as well prior to admit.    No past medical history on file.    No past surgical history on file.    Review of patient's allergies indicates:   Allergen Reactions    Sulfa  (sulfonamide antibiotics) Hives     Current Facility-Administered Medications   Medication Dose Route Frequency Provider Last Rate Last Admin    albumin human 25% bottle 25 g  25 g Intravenous BID Theresa Alvarez MD   Stopped at 04/22/24 1007    cefTRIAXone (ROCEPHIN) 2 g in sodium chloride 0.9 % 100 mL IVPB (MB+)  2 g Intravenous Q24H Theresa Alvarez MD   Stopped at 04/22/24 0157    dextrose 10% bolus 125 mL 125 mL  12.5 g Intravenous PRN Kobi Villarreal MD        dextrose 10% bolus 250 mL 250 mL  25 g Intravenous PRN Kobi Villarreal MD        folic acid tablet 1 mg  1 mg Oral Daily Kobi Villarreal MD   1 mg at 04/22/24 0840    glucagon (human recombinant) injection 1 mg  1 mg Intramuscular PRN Kobi Villarreal MD        glucose chewable tablet 16 g  16 g Oral PRN Kobi Villarreal MD        glucose chewable tablet 24 g  24 g Oral PRN Kobi Villarreal MD        lactulose 20 gram/30 mL solution Soln 15 g  15 g Oral BID Kobi Villarreal MD   15 g at 04/22/24 0839    LORazepam tablet 2 mg  2 mg Oral Q4H PRN Antonio Colindres DO   2 mg at 04/22/24 1116    midodrine tablet 10 mg  10 mg Oral TID Theresa Alvarez MD   10 mg at 04/22/24 0840    multivitamin tablet  1 tablet Oral Daily Kobi Villarreal MD   1 tablet at 04/22/24 0840    mupirocin 2 % ointment   Nasal BID Theresa Alvarez MD   Given at 04/22/24 0841    naloxone 0.4 mg/mL injection 0.02 mg  0.02 mg Intravenous PRN Kobi Villarreal MD        octreotide (SANDOSTATIN) 500 mcg in sodium chloride 0.9% 100 mL infusion  50 mcg/hr Intravenous Continuous West, Antonio, DO 10 mL/hr at 04/22/24 0640 50 mcg/hr at 04/22/24 0640    pantoprazole injection 40 mg  40 mg Intravenous BID Antonio Colindres DO   40 mg at 04/22/24 0839    potassium chloride SA CR tablet 40 mEq  40 mEq Oral Q4H Theresa Alvarez MD   40 mEq at 04/22/24  0839    sodium chloride 0.9% flush 10 mL  10 mL Intravenous Q12H PRN Kobi Villarreal MD        thiamine tablet 100 mg  100 mg Oral Daily Theresa Alvarez MD   100 mg at 04/22/24 0840     Family History    None       Tobacco Use    Smoking status: Not on file    Smokeless tobacco: Not on file   Substance and Sexual Activity    Alcohol use: Not on file    Drug use: Not on file    Sexual activity: Not on file     Review of Systems   Constitutional:  Positive for activity change and fatigue. Negative for chills and fever.   HENT:  Negative for congestion and rhinorrhea.    Eyes:  Negative for pain and visual disturbance.   Respiratory:  Negative for cough and wheezing.    Cardiovascular:  Negative for chest pain, palpitations and leg swelling.   Gastrointestinal:  Positive for abdominal distention and blood in stool. Negative for constipation, diarrhea, nausea and vomiting.   Genitourinary:  Positive for decreased urine volume and dysuria. Negative for flank pain and hematuria.   Musculoskeletal:  Negative for back pain and myalgias.   Skin:  Positive for color change. Negative for wound.   Neurological:  Negative for dizziness, speech difficulty and headaches.   Psychiatric/Behavioral:  Positive for confusion. Negative for agitation. The patient is not nervous/anxious.      Objective:     Vital Signs (Most Recent):  Temp: 98.8 °F (37.1 °C) (04/22/24 1156)  Pulse: 93 (04/22/24 1156)  Resp: 17 (04/22/24 1156)  BP: (!) 93/50 (04/22/24 1156)  SpO2: (!) 88 % (04/22/24 1156) Vital Signs (24h Range):  Temp:  [98.4 °F (36.9 °C)-99.8 °F (37.7 °C)] 98.8 °F (37.1 °C)  Pulse:  [] 93  Resp:  [17-19] 17  SpO2:  [83 %-97 %] 88 %  BP: ()/(43-66) 93/50     Weight: 71.7 kg (158 lb) (04/22/24 0815)  Body mass index is 25.5 kg/m².  Body surface area is 1.83 meters squared.    I/O last 3 completed shifts:  In: 1690.3 [P.O.:250; I.V.:332.7; Blood:552.5; IV Piggyback:555.2]  Out: 150 [Urine:150]     Physical  Exam  Vitals reviewed.   Constitutional:       General: She is not in acute distress.  HENT:      Head: Normocephalic and atraumatic.   Eyes:      General: Scleral icterus present.      Extraocular Movements: Extraocular movements intact.      Pupils: Pupils are equal, round, and reactive to light.   Cardiovascular:      Rate and Rhythm: Normal rate and regular rhythm.      Heart sounds: No murmur heard.  Pulmonary:      Effort: Pulmonary effort is normal. No respiratory distress.      Breath sounds: No wheezing or rales.   Abdominal:      General: Abdomen is flat. There is distension.      Palpations: Abdomen is soft.      Tenderness: There is no abdominal tenderness.   Musculoskeletal:         General: No swelling or tenderness. Normal range of motion.      Cervical back: Normal range of motion.      Right lower leg: No edema.      Left lower leg: No edema.   Skin:     General: Skin is warm and dry.      Coloration: Skin is jaundiced.      Findings: Bruising present.      Comments: Telangiectasias on chest   Neurological:      Mental Status: She is alert and oriented to person, place, and time. Mental status is at baseline.   Psychiatric:      Comments: Tearful          Significant Labs:  CBC:   Recent Labs   Lab 04/22/24  0730 04/22/24  1213   WBC 10.23 8.90   RBC 2.02* 2.00*   HGB 7.9* 7.6*   HCT 21.9* 20.9*   PLT 55*  --    * 105*   MCH 39.1* 38.0*   MCHC 36.1* 36.4*     Recent Labs   Lab 04/21/24  2050   COLORU Yellow   SPECGRAV >1.030*   PHUR 6.0   PROTEINUA 1+*   BACTERIA Many*   NITRITE Negative   LEUKOCYTESUR Trace*   HYALINECASTS 0     All labs within the past 24 hours have been reviewed.    Significant Imaging:  CT: Reviewed  Assessment/Plan:     Renal/  PB (acute kidney injury)  Hyponatremia    38 year old female with history of alcohol use disorder presenting initially to OSH with jaundice found to be in acute liver failure tranferred to [facility] [place] for liver transplant evaluation. Complications  include acute cystitis as well as concern for upper GI bleed. Nephrology consulted for up-trending creatinine and hyponatremia. In terms of her PB the patient's Cr was at presumed baseline of 0.7 on 4/20 and has up-trended while admitted to 1.5 on 4/22. Given timing of Cr elevation concern for offending agent given while admitted. She was on vancomycin with a supratherapeutic vanc trough on 4/22 (vanc since discontinued). Additionally, she received IV contrast w/ CTA on 4/20. No other nephrotoxic medications identified. Additional etiologies include pre-renal (as evidenced by urine Na < 10). Her MAPs have been > 65 since admission, do not suspect ischemic injury however she may have low effective circulatory volume if HRS is present. Bilirubin induced nephropathy also on differential. Would recommend IV albumin x 2 days to evaluate for HRS. If no improvement in Cr then favor HRS as contributing etiology.    In terms of hyponatremia Na improving to 124 on 4/22 from 120 on 4/20. Suspect secondary to liver dysfunction. Would recommend 1.5L fluid restriction.    -- Agree with holding diuresis while evaluating for HRS  -- Continue IV albumin 25 g BID x 2 days; if no improvement then HRS more likely  -- Trend Cr daily  -- Avoid nephrotoxic medications and IV contrast   -- Agree with stopping vancomycin  -- Fluid restriction of 1L daily for hyponatremia  -- Trend Na daily  -- IF no improvement in Cr then will evaluate urine sediment      Thank you for your consult. I will follow-up with patient. Please contact us if you have any additional questions.    Jose Eduardo Olsen MD  Nephrology  [facility] - Telemetry Stepdown

## 2024-04-22 NOTE — ASSESSMENT & PLAN NOTE
RESOLVED  Patient has Abnormal Magnesium: hypomagnesemia. Will continue to monitor electrolytes closely. Will replace the affected electrolytes and repeat labs to be done after interventions completed. The patient's magnesium results have been reviewed and are listed below.  Recent Labs   Lab 04/22/24  0730   MG 2.1

## 2024-04-22 NOTE — MEDICAL/APP STUDENT
"{  ADHD   AIMS   AUDIT   AUDIT-C   C-SSRS (Screen)   C-SSRS (Short)   C-SSRS (Full)   DAST   DAST-10   JUSTINO-7   MMSE   MOCA   PCL-5   PHQ-9   CHERI   YMRS   :[id number]}  INITIAL VISIT: ADDICTION PSYCHIATRY CONSULTATION SERVICE      ASSESSMENT AND PLAN:     DIAGNOSES & PROBLEMS:  Alcohol use disorder  Pre-transplant evaluation    In Summary:  Carola Tovar is a 37 yo female from [place] w/ hx of chronic alcohol use. Previously 4 years sober, recently resumed alcohol use 6 months ago. She presented to an outside facility 2/2 jaundiced and transferred to [facility] for Hepatology. Addiction Medicine was consulted for AUD and pre-transplantation evaluation    Plan:  ***  {Dispo and Management Options:07330::" "," "}    PRESENTATION:     Carola Tovar presents with the following chief complaint: alcohol and/or drug addiction and pre-transplant evaluation    Per Chart:  Patient was transferred from [facility] for higher level of support requiring Hepatology for acute hepatic cirrhosis 2/2 alcohol use. Addiction Medicine was consulted for AUD and pre-transplant evaluation.    Per Patient:  Patient's last drink was 6-7 days ago. She is currently not experiencing any symptoms of withdrawal but notes nausea. She was previously sober for 4 years. She recently started drinking again 6 months ago, noting she thought she "could have just one drink." During the past 6 months, she had been consuming 4-5 bottles of 16oz, 8% vodka qD. Three weeks ago, she started experiencing fatigue, claudication in the LL when walking from the couch to the kitchen, and notes her skin was yellow. Two weeks ago, she reduced her alcohol intake to 1 x 16oz bottle of 8% vodka qD. Patient has been involved Narcotics Anonymous in the past and a senior care house in [place].     Patient notes she decided to go sober 4-5 years ago after being hospitalized for hallucinations (visual and auditory).    Patient also smokes 2-3 tobacco cigarettes daily for " "the past 25 years. Patient notes she could easily get rid of her smoking habit if needed. Patient has been in a relationship with boyfriend for 1.5 years whom she lives with in [place]. Her boyfriend does not drink alcohol. She has 4 children who live with their father in [place]. She is currently not employed and is financially support by her boyfriend. Patient denies SI/HI/SIB. She has no access to firearms.     Collateral:   N/A      REVIEW OF SYSTEMS:  I[x]I Patient denies any pertinent ROS, and none is known.  I[]I Patient unable or unwilling to provide any ROS.    [] Y  [x] N  sleep disturbance: ** {Globally:98852:::1} {Positive for:14078:::1}  [] Y  [x] N  appetite/weight change: ** {Globally:10396:::1} {Positive for:79759:::1}  [x] Y  [] N  fatigue/anergia: ** {Globally:57216:::1} {Positive for:31221:::1}  [x] Y  [] N  impairment in focus/concentration: ** Globally: fluctuating {Positive for:99207:::1}     [] Y  [x] N  depression: ** {Globally:40182:::1} {Positive for:50468:::1} {Negative for:47351:::1}  [] Y  [x] N  anxiety/worry: ** {Globally:09037:::1} {Positive for:50220:::1} {Negative for:03435:::1}  [] Y  [x] N  dysregulated mood/behavior: ** {Globally:58639:::1} {Positive for:75806:::1} {Negative for:25164:::1}  [] Y  [x] N  manic symptomatology: ** {Globally:80188:::1} {Positive for:31295:::1} {Negative for:35225:::1}  [] Y  [x] N  psychosis: ** {Globally:37850:::1} {Positive for:99477:::1} {Negative for:65262:::1}  {Pertinent +/-:11681::" "} {Positive for:79163:::1} {Negative for:23167:::1}    A pertinent medical review of systems was performed with the following notable findings: Patient was initially alert but became drowsy over a period of one hour.    CURRENT PSYCHOTROPIC REGIMEN:  I[]I Y  I[x]I N  I[]I U    ***      ADDICTION:     I[x]I Y  I[]I N  I[]I U  I[]I Current  I[]I Former  Nicotine Use:2-3 tobacco cigarettes qD for 25 years   I[x]I Y  I[]I N  " I[]I U  I[]I Current  I[]I Former  Alcohol Use: 4 years sober, 6 months ago resumed alcohol use 4-5 x 16oz, 8% vodka, 2 weeks ago reduced consumption 1 x 16oz, 8% vodka qD  I[x]I Y  I[]I N  I[]I U  I[]I Current  I[]I Former  Alcohol Misuse/Abuse: See above  I[]I Y  I[x]I N  I[]I U  I[]I Current  I[]I Former  Illicit Drug Use/Misuse/Abuse:   I[]I Y  I[x]I N  I[]I U  I[]I Current  I[]I Former  Misuse/Abuse of Rx Medications:   I[]I Cannabis  I[]I Cocaine  I[]I Heroin  I[]I Meth  I[]I Opioids  I[]I Stimulants  I[]I Benzos  I[]I Other:     I[x]I N/A  I[]I U  Substance(s) of Choice: ***  I[x]I N/A  I[]I U  Substance(s) Used Currently/Recently: ***  I[]I N/A  I[]I U  Alcohol Consumption: daily or near daily See above  I[]I N/A  I[]I U  Last Drink: 6-7 days ago  I[x]I N/A  I[]I U  Last Drug Use: ***  I[]I N/A  I[]I U  Duration of Sobriety/Abstinence: 4 years    I[x]I Y  I[]I N  I[]I U  Hx of Detox:   I[x]I Y  I[]I N  I[]I U  Hx of Rehab:   I[]I Y  I[x]I N  I[]I U  Hx of IVDU:   I[]I Y  I[x]I N  I[]I U  Hx of Accidental Overdose:   I[]I Y  I[x]I N  I[]I U  Hx of DUI:   I[x]I Y  I[]I N  I[]I U  Hx of Complicated Withdrawal (i.e. Seizures and/or Delirium Tremens): Hallucinationss (visual, auditory)  I[]I Y  I[x]I N  I[]I U  Hx of Known/Suspected Substance-Induced Psychiatric Disorder:   I[]I Y  I[x]I N  I[]I U  Hx of Medication Assisted Treatment:   I[x]I Y  I[]I N  I[]I U  Hx of Twelve Step Program (or Equivalent) Involvement: Narcotics Anonymous  I[]I Y  I[x]I N  I[]I U  Currently Exhibits Signs of Intoxication:   I[x]I Y  I[]I N  I[]I U  Currently Exhibits Signs of Withdrawal: drowsy, AMS  I[]I Y  I[]I N  I[x]I U  Currently Active in Recovery:   I[x]I Y  I[]I N  I[]I U  Social Support:   I[]I Y  I[x]I N  I[]I U  Spouse/Partner Consumption:     I[]I N/A  I[x]I Y  I[]I N  I[]I U  Acknowledges/Accepts Addiction:   I[]I N/A  I[x]I Y  I[]I N  I[]I U  Advised to Quit/Cut Back:   I[]I N/A  I[x]I Y  I[]I N  I[]I U  Alcohol/Drug  "Cessation ("Wants to Quit"): Making Efforts to Cut Back or Quit  I[]I N/A  I[]I Y  I[]I N  I[]I U  Motivation to Pursue Treatment: Agrees as Part of Transplant Process  I[]I N/A  I[]I Y  I[]I N  I[]I U  Tobacco Cessation ("Wants to Quit"): encouragement provided    DSM-5-TR SUBSTANCE USE DISORDER CRITERIA:     -- Impaired Control:  I[]I Y  I[x]I N  I[]I U  I[]I A  I[]I D  Often take in larger amounts or over a longer period of time than was intended:   I[]I Y  I[]I N  I[x]I U  I[]I A  I[]I D  Persistent desire or unsuccessful efforts to cut down or control use:   I[]I Y  I[]I N  I[x]I U  I[]I A  I[]I D  Great deal of time spent in activities necessary to obtain substance, use, or recover from effects:   I[]I Y  I[]I N  I[x]I U  I[]I A  I[]I D  Craving/strong desire for substance or urge to use:   -- Social Impairment:  I[]I Y  I[]I N  I[x]I U  I[]I A  I[]I D  Use resulting in failure to fulfill major role obligations at home, work or school:   I[]I Y  I[]I N  I[x]I U  I[]I A  I[]I D  Social, occupational, recreational activities decreased because of use:   I[]I Y  I[]I N  I[x]I U  I[]I A  I[]I D  Continued use despite having persistent or recurrent social or interpersonal problems caused or exacerbated by the substance:   -- Risky Use:  I[]I Y  I[]I N  I[x]I U  I[]I A  I[]I D  Recurrent use in situations in which it is physically hazardous:   I[]I Y  I[]I N  I[x]I U  I[]I A  I[]I D  Use despite physical or psychological problems that are likely to have been caused or exacerbated by the substance:   -- Neuroadaptation:  I[]I Y  I[]I N  I[x]I U  I[]I A  I[]I D  Tolerance, as defined by either of the following: (1) a need for markedly increased amounts of substance to achieve intoxication or desired effect.  -OR- (2) a markedly diminished effect with continued use of the same amount of substance:   I[]I Y  I[x]I N  I[]I U  I[]I A  I[]I D  Withdrawal, as manifested by either of the following: (1) the characteristic " withdrawal syndrome for substance.  -OR- (2) substance is taken to relieve or avoid withdrawal symptoms:   -- Mild (1-3), Moderate (4-5), Severe (?6)    I[]I N/A  I[x]I Y  I[]I N  I[]I U  I[]I A  I[]I D  Active Substance Use Disorder:       HISTORY:     I[]I Patient denies any history, and none is known.  I[]I Patient unable or unwilling to provide any history.    I[]I Y  I[x]I N  I[]I U  Psychiatric Diagnoses: ***  I[]I Y  I[x]I N  I[]I U  Current Psychiatric Provider (if Applicable):   I[x]I Y  I[]I N  I[]I U  Hx of Psychiatric Hospitalization: Hospitalized 4 years ago for hallucinations 2/2 AUD  I[]I Y  I[x]I N  I[]I U  Hx of Outpatient Psychiatric Treatment (psychiatry/psychotherapy):   I[]I Y  I[x]I N  I[]I U  Psychotropic Trials: ***  I[]I Y  I[x]I N  I[]I U  Prior Suicide Attempts:   I[]I Y  I[x]I N  I[]I U  Hx of Suicidal Ideation:   I[]I Y  I[x]I N  I[]I U  Hx of Homicidal Ideation:   I[]I Y  I[x]I N  I[]I U  Hx of Self-Injurious Behavior (Non-Suicidal):   I[]I Y  I[x]I N  I[]I U  Hx of Violence:   I[]I Y  I[x]I N  I[]I U  Documented Hx of Malingering:     FAMILY HISTORY:  I[]I Y  I[]I N  I[x]I U    ***    I[]I Y  I[x]I N  I[]I U  Hx of Trauma/Neglect:   I[]I Y  I[]I N  I[]I U  Hx of Physical Abuse:   I[]I Y  I[x]I N  I[]I U  Hx of Sexual Abuse:   I[x]I Y  I[]I N  I[]I U  Happy Childhood:   I[]I Y  I[x]I N  I[]I U  Significant Developmental Delay/Disability:   I[]I Y  I[x]I N  I[]I U  GED/High School Diploma or Beyond:highest level of eduation 11th grade   I[]I Y  I[x]I N  I[]I U  Currently Employed:   I[]I Y  I[]I N  I[x]I U  On or Applying for Disability:   I[x]I Y  I[]I N  I[]I U  Functions Independently:   I[]I Y  I[x]I N  I[]I U  Financially Stable: Supported by boyfriend  I[]I Y  I[x]I N  I[]I U  Domiciled:   I[]I Y  I[x]I N  I[]I U  Lives Alone:   I[x]I Y  I[]I N  I[]I U  Intact Support System:   I[x]I Y  I[]I N  I[]I U  Heterosexual/Cisgender:   I[x]I Y  I[]I N  I[]I U  Currently in a Romantic  "Relationship:   I[]I Y  I[]I N  I[x]I U  Ever :   I[x]I Y  I[]I N  I[]I U  Children/Dependents: 4 children lives with their father in [place]  I[x]I Y  I[]I N  I[]I U  Pentecostalism/Spiritual:   I[]I Y  I[x]I N  I[]I U   History:   I[]I Y  I[x]I N  I[]I U  Current Legal Issues:   I[]I Y  I[x]I N  I[]I U  Past Charges/Convictions:   I[]I Y  I[x]I N  I[]I U  Hx of Incarceration:   I[]I Y  I[x]I N  I[]I U  Access to a Gun?: ***  {XX-GunSafety:95877::"NOTE: patient counseled on gun safety, including safe storage.","NOTE: patient counseled on inherent risks associated with gun ownership."}    I[]I Y  I[x]I N  I[]I U  Hx of Seizure:   I[]I Y  I[x]I N  I[]I U  Hx of Significant Head Injury (e.g., Loss of Consciousness, Concussion, Coma):   I[]I Y  I[x]I N  I[]I U  Medical History & Diagnoses:       The patient's past medical history has been reviewed and updated as appropriate within the electronic medical record system.  {Problem List & Past Medical History:50198::"     "}    Scheduled and PRN Medications: The electronic chart was reviewed and updated as appropriate.  See Medcard for details.  {Current Medications:86940::"     "}    Allergies:  Sulfa (sulfonamide antibiotics)    PSYCHOSOCIAL FACTORS:  Stressors (Biopsychosocial, Cultural and Environmental): Unable to Assess  Functioning Relationships: good support system    STRENGTHS AND LIABILITIES:   Strength: Patient is expressive/articulate.  Strength: Patient is accepting of treatment.  Strength: Patient is seeking help.  Liability: Patient is acutely decompensated.    Additional Relevant History, As Applicable:       EXAMINATION:     BP (!) 107/56 (BP Location: Left arm, Patient Position: Lying)   Pulse 94   Temp 98.4 °F (36.9 °C) (Oral)   Resp 17   Ht 5' 6" (1.676 m)   Wt 71.7 kg (158 lb)   SpO2 97%   Breastfeeding No   BMI 25.50 kg/m²     MENTAL STATUS EXAMINATION:  General Appearance: ** {Free Text:94878::"***"} appears stated age, well " "developed and nourished, adequately groomed and appropriately dressed, in no acute distress  Behavior: ** {Free Text:68357::"***"} normal; cooperative; reasonably friendly, pleasant, and polite; appropriate eye-contact; under good behavioral control  Involuntary Movements and Motor Activity: ** {Free Text:03573::"***"} no abnormal involuntary movements noted, no psychomotor agitation or retardation  Gait and Station: ** {Free Text:57246::"***"} unable to assess - patient lying down or seated  Speech and Language: ** {Free Text:91449::"***"} conversational, spontaneous, speaks and understands English proficiently  Mood: "***"  Affect: ** {Free Text:09602::"***"} reactive, mood congruent  Thought Process and Associations: ** {Free Text:09430::"***"} linear and goal-directed, with no loosening of associations  Thought Content and Perceptions: ** {Free Text:51986::"***"} no suicidal or homicidal ideation, no evidence of psychosis  Sensorium: ** {Free Text:56746::"***"} drowsy  Recent and Remote Memory: ** {Free Text:69343::"***"} grossly intact, no significant impairments noted  Attention and Concentration: ** {Free Text:94322::"***"} attentive, not readily distractible  Fund of Knowledge: ** {Free Text:49348::"***"} grossly intact, used appropriate vocabulary, no significant deficits noted  Insight: ** {Free Text:22922::"***"} intact, demonstrates awareness of illness  Judgment: ** {Free Text:68482::"***"} intact, behavior is adequate/appropriate given the circumstances      RISK MANAGEMENT:     I[]I Y  I[x]I N  I[]I U  I[]I A  Suicidal Ideation/Behavior: ** {Specifiers:41739}  I[]I Y  I[x]I N  I[]I U  I[]I A  Homicidal Ideation/Behavior: **  I[]I Y  I[x]I N  I[]I U  I[]I A  Violence: **  I[]I Y  I[x]I N  I[]I U  I[]I A  Self-Injurious Behavior: **    The patient is deemed to be a reliable and factually accurate historian.    I[]I Y  I[x]I N  I[]I U  I[]I A  I[]I N/A  Minimization of Risk Parameters Suspected/Evident: " "**  I[]I Y  I[x]I N  I[]I U  I[]I A  I[]I N/A  Exaggeration of Risk Parameters Suspected/Evident: **  ***    [] Y  [x] N  Danger to Self:   [] Y  [x] N  Danger to Others:   [] Y  [x] N  Grave Disability:       In cases of emergency, daily coverage provided by Acute/ER Psych MD, NP, PA, or SW, with contact numbers located in [facility] On Call Schedule.    Sunshine Monae  Department of Psychiatry  [facility]      KEY:     I[]I Y = Yes / Present / Endorses  I[]I N = No / Absent / Denies  I[]I U = Unknown / Unable to Assess / Unwilling to Participate  I[]I A = Ambiguity Exists / Accuracy Uncertain  I[]I D = Denial or Minimization is Suspected/Evident  I[]I N/A = Non-Applicable    CHART REVIEW:     Available documentation has been reviewed, and pertinent elements of the chart have been incorporated into this evaluation where appropriate.    The patient's last Epic encounter in the psychiatry department was on: Visit date not found  The patient's first Epic encounter in the psychiatry department was on:      LA/MS  AWARE  Site reviewed - { Options:56561::" "}  ***    ADVICE AND COUNSELING:     [x] In cases of emergencies (e.g. SI/HI resulting in danger to self or others, functioning deteriorates to the level of grave disability), call 911 or [phone], or present to the emergency department for immediate assistance.  [x] Individuals should not operate a motor vehicle or heavy machinery if effects of medications or underlying symptoms/condition impair the ability to safely do so.    Alcohol, Tobacco, and Drug Counseling, as well as resources, has been provided, as warranted.     Shared medical decision making and informed consent are the hallmark and bedrock of good clinical care, and as such have been employed and obtained, respectively, to the degree possible.      Risk Mitigation Strategies, Harm Reduction Techniques, and Safety Netting are important interventions that can reduce acute and " chronic risk, and as such have been employed to the degree possible.    Prescription Drug Management entails the review, recommendation, or consideration without recommendation of medications, and as such was employed during the encounter.    Additional Psychoeducation has been provided, as warranted.    Discussed, to the extent possible, diagnosis, risks and benefits of proposed treatment vs alternative treatments vs no treatment, potential side effects of these treatments and the inherent unpredictability of treatment. {Ability to Consent:52814}    Written material*** has been provided to supplement, augment, and reinforce any discussions and interventions, via the AVS or other pre-printed handouts, as warranted.      DIAGNOSTIC TESTING:     The chart was reviewed for recent diagnostic procedures and investigations, and pertinent results are noted below.     Glu 100  4/22/2024  Li *   *  TSH *   *    HgA1c *   *  VPA *   *   FT4 *   *    Na 123 (L)  4/22/2024  CLZ *   *  WBC 10.23  4/22/2024    Cr 1.5 (H)  4/22/2024  ANC 6.6; 64.6;   4/22/2024   Hgb 7.9 (L)  4/22/2024     BUN 19  4/22/2024  Trop I 0.018  4/21/2024  HCT 21.9 (L)  4/22/2024     GFR 44.9 (A)  4/22/2024   CPK *   *  PLT 55 (L)  4/22/2024     Alb 2.1 (L)  4/22/2024   PRL *   *  B12 *   *     T Bili 20.6 (H)  4/22/2024  Chol *   *  B9 *   *      4/22/2024  TGs *   *  B1 *   *     (H)  4/22/2024  HDL *   *  Vit D *   *     ALT 40  4/22/2024  LDL *   *  HIV *   *     INR 1.9 (H)  4/22/2024  Lilian *   *   Hep C Non-reactive  4/20/2024    GGT *   *  Lip 52  4/20/2024  RPR *   *     (H)  4/22/2024   NH4 68 (H)  4/20/2024  UPT *   *      PETH *   *  THC *   *    ETOH *   *  LUIS *   *    EtG *   *  AMP *   *    ALC *   *  OPI *   *    BZO *   *  MTD *   *     BAR *   *  BUP *   *    PCP *   *  FEN *   *     Results for orders placed or performed during the hospital encounter of 04/20/24    EKG 12-lead    Collection Time: 04/20/24 11:39 PM   Result Value Ref Range    QRS Duration 108 ms    [facility] QTC Calculation 514 ms    Narrative    Test Reason : D64.9,    Vent. Rate : 109 BPM     Atrial Rate : 109 BPM     P-R Int : 150 ms          QRS Dur : 108 ms      QT Int : 382 ms       P-R-T Axes : 055 033 045 degrees     QTc Int : 514 ms    Sinus tachycardia  Otherwise normal ECG  No previous ECGs available  Confirmed by Ubaldo NIELSEN MD ([id number]) on 4/21/2024 9:44:48 AM    Referred By: ROGERIO HAIR           Confirmed By:Ubaldo NIELSEN MD       No results found for this or any previous visit.    CONSULTATION:     A diagnostic psychiatric evaluation was performed and responsiveness to treatment was assessed.  {XX-ResponseTX:68291}    Consults    {XX-Courtesy:57144}

## 2024-04-22 NOTE — ASSESSMENT & PLAN NOTE
Patient with known Cirrhosis with Child's class C. Co-morbidities are present and inclusive of portal hypertension and anemia/pancytopenia.  MELD-Na score calculated; MELD 3.0: 34 at 4/22/2024 12:13 PM  MELD-Na: 33 at 4/22/2024 12:13 PM  Calculated from:  Serum Creatinine: 1.5 mg/dL at 4/22/2024 12:13 PM  Serum Sodium: 124 mmol/L (Using min of 125 mmol/L) at 4/22/2024 12:13 PM  Total Bilirubin: 20.1 mg/dL at 4/22/2024 12:13 PM  Serum Albumin: 2.4 g/dL at 4/22/2024 12:13 PM  INR(ratio): 1.9 at 4/22/2024  8:54 AM  Age at listing (hypothetical): 38 years  Sex: Female at 4/22/2024 12:13 PM      Continue chronic meds. Etiology likely ETOH. Will avoid any hepatotoxic meds, and monitor CBC/CMP/INR for synthetic function.     For plan please see liver failure

## 2024-04-22 NOTE — NURSING
Nigel at Pharmacy notified regarding critical Vanc through level of 30.3. To hold the 0300 dose per pharmacy.

## 2024-04-22 NOTE — ASSESSMENT & PLAN NOTE
Pt with hx of alcohol use disorder and recently diagnosed alcohol liver cirrhosis. Lab work remarkable for elevated INR and Plts 42, PT 25.8 INR 2.5 Fibrinogen 105.  - S/p 2 units prbc, 1 unit plts, and 1 unit cryo on admission    Plan  - Plts goal > 50K  - CBC q6 hrs  - Daily PT/INR

## 2024-04-22 NOTE — ASSESSMENT & PLAN NOTE
Carola Tovar is a 38 y.o. female with history of alcohol use disorder who presents with alcoholic hepatitis superimposed on decompensated cirrhosis. Relapsed last year after 4 year period of sobriety. Did drink despite knowledge of cirrhosis discovered last year after relapse. MELD 33 with significant hyponatremia but fortunately not encephalopathic and with intact renal function. Concerned for history of relapse and ongoing drinking despite knowledge of liver disease. GI consulted for melena.      Problem List:  Decompensated alcoholic cirrhosis  Alcoholic hepatitis  Melena  Hyponatremia  PB     Recommendations:  - She is currently not a candidate for liver transplant evaluation as she has continued to consume alcohol despite knowledge of her liver diease. Additionally, she does not have insurance which is a significant barrier.   - For hyponatremia: no free water. Only milk or boost/ensure. Would fluid restrict to 1 L.   - Nephrology consult given worsening kidney function and hyponatremia.   - Follow up PETH.   - Addiction psych consult  - Timing of EGD per GI. Continue IV PPI, octreotide, and CTX  - Low salt, high protein diet  - No accessible pocket of ascites on US per primary team. Can hold off on paracentesis.  - Lactulose titrated to 2-3 BMs per day  - Daily CBC, CMP, INR.

## 2024-04-22 NOTE — PROGRESS NOTES
VANCOMYCIN DOSING BY PHARMACY DISCONTINUATION NOTE    Carola Tovar is a 38 y.o. female who had been consulted for vancomycin dosing.    The pharmacy consult for vancomycin dosing has been discontinued.     Vancomycin Dosing by Pharmacy Consult will sign-off. Please reconsult if necessary. Thank you for allowing us to participate in this patient's care.     Brielle Kent, Pharm.D.,[facility]  Phone: [phone]

## 2024-04-22 NOTE — SUBJECTIVE & OBJECTIVE
Subjective:     Interval History:   Told RN this morning she feels like she is withdrawing from alcohol.     Objective:     Vital Signs (Most Recent):  Temp: 98.8 °F (37.1 °C) (04/22/24 1156)  Pulse: 93 (04/22/24 1156)  Resp: 17 (04/22/24 1156)  BP: (!) 93/50 (04/22/24 1156)  SpO2: (!) 88 % (04/22/24 1156) Vital Signs (24h Range):  Temp:  [98.4 °F (36.9 °C)-99.8 °F (37.7 °C)] 98.8 °F (37.1 °C)  Pulse:  [] 93  Resp:  [17-19] 17  SpO2:  [83 %-97 %] 88 %  BP: ()/(43-66) 93/50     Weight: 71.7 kg (158 lb) (04/22/24 0815)  Body mass index is 25.5 kg/m².      Intake/Output Summary (Last 24 hours) at 4/22/2024 1210  Last data filed at 4/22/2024 0532  Gross per 24 hour   Intake 1137.83 ml   Output 150 ml   Net 987.83 ml       Lines/Drains/Airways       Peripheral Intravenous Line  Duration                  Peripheral IV - Single Lumen 04/20/24 1322 Posterior;Right Hand 1 day         Peripheral IV - Single Lumen 04/20/24 1708 Anterior;Proximal;Right Forearm 1 day         Peripheral IV - Single Lumen 04/20/24 2200 20 G Left Antecubital 1 day                     Physical Exam  Vitals reviewed.   Constitutional:       General: She is not in acute distress.     Appearance: She is ill-appearing.   HENT:      Head: Normocephalic.   Eyes:      General: No scleral icterus.     Extraocular Movements: Extraocular movements intact.   Cardiovascular:      Rate and Rhythm: Normal rate.   Pulmonary:      Effort: Pulmonary effort is normal.   Skin:     Coloration: Skin is jaundiced.   Neurological:      Mental Status: She is alert. Mental status is at baseline.          Significant Labs:  All pertinent lab results from the last 24 hours have been reviewed.

## 2024-04-22 NOTE — CONSULTS
274}  INITIAL VISIT: PSYCHIATRY  Addiction Psychiatry Consultation Service  Pre-Transplant Evaluation      ASSESSMENT AND PLAN:     DIAGNOSES & PROBLEMS:  Alcohol Use Disorder, Severe  R/O encephalopathy  Hx complicated withdrawal (delirium tremens)      In Summary:  Patient is a 39 yo F with hx of AUD and complicated alcohol withdrawal (delirium tremens) who presented to [facility] in [place] with a one month hx of scleral icterus and found to be in acute on chronic liver failure and was transferred to [facility]. UDS/BAL not obtained on admission. PETH pending. Addiction Psychiatry consulted for Liver Transplant Evaluation. Patient evaluated at bedside and appears to have fluctuating sensorium and some discrepancies in reported timeline of diagnosis of cirrhosis and recent period of alcohol use. Discrepancies and fluctuating sensorium possibly related to encephalopathy. She endorses recent use of alcohol despite knowledge of deteriorating liver function but reports she is motivated to stop using alcohol at this time. She reports she is agreeable to involvement in twelve step programs as well as with formal treatment in the form of IOP. At this time, detox via scheduled benzo taper does not appear to be indicated based on clinical presentation and VS monitoring. Given unclear report of last drink timeline, Ok to continue PRN ativan and CIWA monitoring. Patient counseled on cessation of alcohol use, need for lifeline sobriety. Encouragement, motivational interviewing, and resources provided.    Transplant Suitability:  From a psychiatric perspective, this patient presents as a modifiably high risk for transplant.    Plan:  - Scheduled medication for detox not indicated at this time  - Ok to continue PRN Ativan for VS parameters     -- DISPO ###  With reasonable medical certainty, based on history, chart review, available collateral information, and a present-state examination:  - the patient does not currently meet the criteria  for psychiatric hospitalization  - the need for psychiatric hospitalization is currently NOT anticipated upon stabilization of acute medical condition  -- LEGAL STATUS ###  - PEC is not indicated  -- ADDICTION ###  ALCOHOL (OR SEDATIVE-HYPNOTIC) WITHDRAWAL MANAGEMENT:  - impending alcohol (or sedative-hypnotic) withdrawal cannot be ruled out at this time, therefore in an abundance of caution would recommend initiating detox protocol, including supportive measures,frequent monitoring of vitals and CIWA-Ar, and use of PRN benzodiazepines to minimize risk of developing complicated withdrawal (e.g., seizures, delirium)  - provide supportive care as warranted: e.g., fluid and electrolyte replacement, vitamin repletion (thiamine, folic acid, multivitamin), adequate caloric support  - routine monitoring of PETH levels to assess for maintenance of sobriety  - recommend patient enroll in addiction rehab program after discharge and medical stabilization  - counseled on full abstinence from alcohol and substances of abuse (illicit and prescription)  - full engagement in 12 step (or equivalent) recovery program(s), including meeting attendance and acquisition/maintenance of sponsor  - relapse prevention and motivational interviewing provided  - provided resources for various addiction rehabilitation options as part of aftercare planning       Will sign off at this time. Thank you for the consult. Addiction Psychiatry is available should additional need for assistance arise.    PRESENTATION:     Carola Tovar presents with the following chief complaint: alcohol and/or drug addiction and pre-transplant evaluation    Per Chart:  Per H&P:   Ms. Tovar is a 39 yo F with alcohol abuse disorder who presented to [facility] ED with jaundice. Pt is originally from [place], but have spent the last 1.5 months in [place] visiting a friend. For the past month she has started noticing yellow discoloration in her eyes and skin. Pt reports that she  "used to drink 48 oz of vodka daily, but for the past week she has taper down to only 16 oz per day. Last drink was yesterday 04/19. She also reports multiple episodes of withdrawal in the past, but no seizure episodes. She also report dysuria and increased urinary frequency. Denies headaches, visual changes, SOB, cough, chest pain, palpitation, nausea, vomiting, abdominal pain, diarrhea, constipation.      At the OSH ED the patient was afebrile tachycardic to 120 with /42. Lab work remarkable for Hb/Hct 7.4/21.0, INR 2.57, Na 120, K 2.5, Mg 0.7. Tbili of 24. U/A nitrite positive. Given IV CTX, Mg and K replacements. Hep studies negative. CT A/P shows cirrhosis and portal HTN, gastric diffuse bowel wall thickening and jose-pancreatic fat stranding. Pt was transferred to [facility] for higher level of care. Pt admitted to hospital medicine for further management of acute on chronic liver failure and hepatology was consulted.     Per Hepatology Consult:   "Visiting friends in [place] for the past month. Lives in [place]. Her boyfriend has been with her in [place]. Reports she also has family support at home in [place]. Regarding alcohol history, she had a period of sobriety for 4 years but relapsed about 9-10 months ago due to life stressors. Had previously established sobriety after living in [facility] and participating in rehab program there. Lately has been drinking 3-4 16 oz malt beverages daily. Has history of hallucinations during withdrawal several years ago, so she presented due to jaundice and concern for withdrawal as she attempts to quit drinking. Reports she was told she had cirrhosis at some point last year after she restarted drinking, and that she was told to stop."      Per chart review:  UDS, BAL not obtained on admission. PETH pending.    Per Patient:  Patient initially interviewed by Medical Student and found to be alert and awake. Patient later interviewed by Resident and found to be drowsy during the " "interview. She reports hx of alcohol use disorder with previous detox and withdrawal complicated by AVH 4-5 years ago. She reports a 4 year period of sobriety following hospitalization for DT's in which she was involved with an IOP, NA, and lived in a correction house in [place]. She reports re-initiation of alcohol use 6 months ago and cites she thought she "could have just one drink." During the past 6 months, she had been consuming 4-5 bottles of 16oz, 8% vodka "Smirnoff Ice" daily. She reports that two weeks ago, she reduced her alcohol intake to 1 x 16oz bottle of 8% vodka daily. She denies symptoms of withdrawal during this time. She initially reports last drink was 6-7 days ago when asked by Medical Student then later reports it was the day of admission but that she did not have a full 16 oz drink at the time. She denies currently experiencing any symptoms of withdrawal but notes nausea. She reports that three weeks ago, she started experiencing fatigue, claudication when walking from the couch to the kitchen, and notes her skin was yellow which prompted her to present to ED. Patient reports she is unclear of when she was first diagnosed with cirrhosis but that it was maybe approximately 8 years ago shortly after diagnosis of fatty liver disease. She reports the subject of liver transplant has just been broached for the first time during this admission. She reports she lives in [place] with her boyfriend, who she reports does not consume alcohol and she considers a good support system for her. She reports plans to return to [place] to live with her boyfriend.  Patient denies any past psychiatric history including inpatient or outpatient treatment and Psychiatric ROS negative except for fatigue and difficulty concentrating. Denies SI/HI/AVH or paranoia.    Collateral:   Daljit Fernandez (Significant other)  [phone]       REVIEW OF SYSTEMS:  I[]I Patient denies any pertinent ROS, and none is known.  I[]I " Patient unable or unwilling to provide any ROS.    [] Y  [x] N  sleep disturbance: denies   [] Y  [x] N  appetite/weight change: denies   [x] Y  [] N  fatigue/anergia: Globally: observed, moderate, current  Positive for: sedation, lapsing into sleep, sleep attacks  [x] Y  [] N  impairment in focus/concentration:  Positive for: diminished ability to think or concentrate, easy distractibility     [] Y  [x] N  depression: denies  [] Y  [x] N  anxiety/worry: denies  [] Y  [x] N  dysregulated mood/behavior: denies  [] Y  [x] N  manic symptomatology: denies  [] Y  [x] N  psychosis:denies    A pertinent medical review of systems was performed with the following notable findings: nausea    CURRENT PSYCHOTROPIC REGIMEN:  I[x]I Y  I[]I N  I[]I U    Ativan 2 mg PO Every 4 hours PRN for Withdrawal, signs/symptoms of withdrawal (SBP>160, DBP>100, HR>100, diaphoresis, tremulous) CIWA > 8       ADDICTION:     I[x]I Y  I[]I N  I[]I U  I[]I Current  I[]I Former  Nicotine Use: 2-3 cigarettes per day for 25 years  I[x]I Y  I[]I N  I[]I U  I[x]I Current  I[]I Former  Alcohol Use:  4 years sober, 6 months ago resumed alcohol use 4-5 x 16oz, 8% vodka, 2 weeks ago reduced consumption 1 x 16oz, 8% vodka qD   I[x]I Y  I[]I N  I[]I U  I[x]I Current  I[x]I Former  Alcohol Misuse/Abuse:   I[]I Y  I[x]I N  I[]I U  I[]I Current  I[]I Former  Illicit Drug Use/Misuse/Abuse:   I[]I Y  I[x]I N  I[]I U  I[]I Current  I[]I Former  Misuse/Abuse of Rx Medications:   I[]I Cannabis  I[]I Cocaine  I[]I Heroin  I[]I Meth  I[]I Opioids  I[]I Stimulants  I[]I Benzos  I[]I Other:     I[]I N/A  I[]I U  Substance(s) of Choice: Alcohol, Vodka  I[]I N/A  I[]I U  Substance(s) Used Currently/Recently: Alcohol, Vodka  I[]I N/A  I[]I U  Alcohol Consumption: exceeds recommended healthy limits, excessive, daily or near daily  I[]I N/A  I[x]I U  Last Drink: initially reports 6-7 days ago then 3 days ago  I[x]I N/A  I[]I U  Last Drug  "Use: denies  I[]I N/A  I[]I U  Duration of Sobriety/Abstinence: longest period of sobriety: 4 years    I[x]I Y  I[]I N  I[]I U  Hx of Detox: 4-5 years ago  I[x]I Y  I[]I N  I[]I U  Hx of Rehab: IOP 4 yrs ago  I[]I Y  I[x]I N  I[]I U  Hx of IVDU:   I[]I Y  I[x]I N  I[]I U  Hx of Accidental Overdose:   I[]I Y  I[x]I N  I[]I U  Hx of DUI:   I[x]I Y  I[]I N  I[]I U  Hx of Complicated Withdrawal (i.e. Seizures and/or Delirium Tremens): DT's  I[]I Y  I[x]I N  I[]I U  Hx of Known/Suspected Substance-Induced Psychiatric Disorder:   I[]I Y  I[x]I N  I[]I U  Hx of Medication Assisted Treatment:   I[x]I Y  I[]I N  I[]I U  Hx of Twelve Step Program (or Equivalent) Involvement: NA  I[]I Y  I[x]I N  I[]I U  Currently Exhibits Signs of Intoxication:   I[]I Y  I[x]I N  I[]I U  Currently Exhibits Signs of Withdrawal:   I[]I Y  I[x]I N  I[]I U  Currently Active in Recovery:   I[x]I Y  I[]I N  I[]I U  Social Support: Boyfriend  I[]I Y  I[x]I N  I[]I U  Spouse/Partner Consumption:     I[]I N/A  I[x]I Y  I[]I N  I[]I U  Acknowledges/Accepts Addiction:   I[]I N/A  I[x]I Y  I[]I N  I[]I U  Advised to Quit/Cut Back:   I[]I N/A  I[x]I Y  I[]I N  I[]I U  Alcohol/Drug Cessation ("Wants to Quit"): Interested in Quitting, Advised to Quit , Assistance Provided, Encouragement Provided, Motivational Interviewing Provided, Resources Provided  I[]I N/A  I[x]I Y  I[]I N  I[]I U  Motivation to Pursue Treatment: Moderate, Agreeable, Willing to engage in twelve step program and IOP  I[]I N/A  I[]I Y  I[x]I N  I[]I U  Tobacco Cessation ("Wants to Quit"): uninterested in quitting or cutting back, encouragement provided, resources provided    ADDITIONAL CONSIDERATIONS FOR TRANSPLANT:     I[]I N/A  I[x]I Y  I[]I N  I[]I U  I[]I A  Awareness of Biomedical Complications:   I[]I N/A  I[x]I Y  I[]I N  I[]I U  I[]I A  Understands Need for Lifetime Sobriety:   I[]I N/A  I[x]I Y  I[]I N  I[]I U  I[]I A  Able to Adhere to Treatment Plan:     Date First Informed of " Impending Organ Failure: unclear, reports approx. 8 years ago, per hepatology documentation 1 year ago.  Continued to Drink/Use Despite Knowledge of Organ Damage: yes, endorses alcohol consumption within the past 6 mos  When Was the Subject of Transplantation First Broached: this current admission  Patient Made the Following Lifestyle Adjustments and How: N/A  Does the Patient Accept/Understand the Connection Between Substance Use and Subsequent Organ Failure: Yes  Understands Need for Lifetime Medication: Yes  Understands Need for Lifetime Sobriety: Yes  View/Acceptance of Twelve Step (or Equivalent) Programs: agreeable to engagement in IOP and Twelve Step Program    DSM-5-TR SUBSTANCE USE DISORDER CRITERIA:     -- Impaired Control:  I[x]I Y  I[]I N  I[]I U  I[]I A  I[]I D  Often take in larger amounts or over a longer period of time than was intended:   I[]I Y  I[]I N  I[]I U  I[]I A  I[x]I D  Persistent desire or unsuccessful efforts to cut down or control use:   I[x]I Y  I[]I N  I[]I U  I[]I A  I[]I D  Great deal of time spent in activities necessary to obtain substance, use, or recover from effects:   I[x]I Y  I[]I N  I[]I U  I[]I A  I[]I D  Craving/strong desire for substance or urge to use:   -- Social Impairment:  I[]I Y  I[]I N  I[]I U  I[]I A  I[x]I D  Use resulting in failure to fulfill major role obligations at home, work or school:   I[]I Y  I[]I N  I[]I U  I[]I A  I[x]I D  Social, occupational, recreational activities decreased because of use:   I[]I Y  I[]I N  I[]I U  I[]I A  I[x]I D  Continued use despite having persistent or recurrent social or interpersonal problems caused or exacerbated by the substance:   -- Risky Use:  I[x]I Y  I[]I N  I[]I U  I[]I A  I[]I D  Recurrent use in situations in which it is physically hazardous:   I[x]I Y  I[]I N  I[]I U  I[]I A  I[]I D  Use despite physical or psychological problems that are likely to have been caused or exacerbated by the substance:   --  Neuroadaptation:  I[]I Y  I[]I N  I[]I U  I[]I A  I[x]I D  Tolerance, as defined by either of the following: (1) a need for markedly increased amounts of substance to achieve intoxication or desired effect.  -OR- (2) a markedly diminished effect with continued use of the same amount of substance:   I[x]I Y  I[]I N  I[]I U  I[]I A  I[]I D  Withdrawal, as manifested by either of the following: (1) the characteristic withdrawal syndrome for substance.  -OR- (2) substance is taken to relieve or avoid withdrawal symptoms:   -- Mild (1-3), Moderate (4-5), Severe (?6)    I[]I N/A  I[x]I Y  I[]I N  I[]I U  I[]I A  I[]I D  Active Substance Use Disorder:       HISTORY:     I[x]I Patient denies any history, and none is known.  I[]I Patient unable or unwilling to provide any history.    I[]I Y  I[x]I N  I[]I U  Psychiatric Diagnoses:  I[]I Y  I[x]I N  I[]I U  Current Psychiatric Provider (if Applicable):   I[]I Y  I[x]I N  I[]I U  Hx of Psychiatric Hospitalization:   I[]I Y  I[x]I N  I[]I U  Hx of Outpatient Psychiatric Treatment (psychiatry/psychotherapy):   I[]I Y  I[x]I N  I[]I U  Psychotropic Trials  I[]I Y  I[x]I N  I[]I U  Prior Suicide Attempts:   I[]I Y  I[x]I N  I[]I U  Hx of Suicidal Ideation:   I[]I Y  I[x]I N  I[]I U  Hx of Homicidal Ideation:   I[]I Y  I[x]I N  I[]I U  Hx of Self-Injurious Behavior (Non-Suicidal):   I[]I Y  I[x]I N  I[]I U  Hx of Violence:   I[]I Y  I[x]I N  I[]I U  Documented Hx of Malingering:     FAMILY HISTORY:  I[]I Y  I[]I N  I[x]I U        I[]I Y  I[x]I N  I[]I U  Hx of Trauma/Neglect:   I[]I Y  I[x]I N  I[]I U  Hx of Physical Abuse:   I[]I Y  I[x]I N  I[]I U  Hx of Sexual Abuse:   I[x]I Y  I[]I N  I[]I U  Happy Childhood:   I[]I Y  I[x]I N  I[]I U  Significant Developmental Delay/Disability:   I[]I Y  I[x]I N  I[]I U  GED/High School Diploma or Beyond: highest level of eduation 11th grade   I[]I Y  I[x]I N  I[]I U  Currently Employed:   I[]I Y  I[]I N  I[x]I U  On or Applying for  Disability:   I[x]I Y  I[]I N  I[]I U  Functions Independently:   I[]I Y  I[x]I N  I[]I U  Financially Stable: Supported by boyfriend  I[x]I Y  I[]I N  I[]I U  Domiciled:   I[]I Y  I[x]I N  I[]I U  Lives Alone: with boyfriend  I[x]I Y  I[]I N  I[]I U  Intact Support System: reports boyfriend and neighbor  I[x]I Y  I[]I N  I[]I U  Heterosexual/Cisgender:   I[x]I Y  I[]I N  I[]I U  Currently in a Romantic Relationship:   I[]I Y  I[]I N  I[x]I U  Ever :   I[x]I Y  I[]I N  I[]I U  Children/Dependents: 4 children live with their father in [place]   I[x]I Y  I[]I N  I[]I U  Orthodox/Spiritual:   I[]I Y  I[x]I N  I[]I U   History:   I[]I Y  I[x]I N  I[]I U  Current Legal Issues:   I[]I Y  I[x]I N  I[]I U  Past Charges/Convictions:   I[]I Y  I[x]I N  I[]I U  Hx of Incarceration:   I[]I Y  I[x]I N  I[]I U  Access to a Gun?: denies  NOTE: patient counseled on gun safety, including safe storage.  NOTE: patient counseled on inherent risks associated with gun ownership.    I[]I Y  I[]I N  I[]I U  Hx of Seizure:   I[]I Y  I[]I N  I[]I U  Hx of Significant Head Injury (e.g., Loss of Consciousness, Concussion, Coma):   I[]I Y  I[]I N  I[]I U  Medical History & Diagnoses:     The patient's past medical history has been reviewed and updated as appropriate within the electronic medical record system.    Liver failure without hepatic coma    Alcohol use disorder    Hyponatremia    Hypomagnesemia    Coagulopathy    Alcoholic cirrhosis    Acute blood loss anemia    Acute cystitis    UGIB (upper gastrointestinal bleed)     Scheduled and PRN Medications: The electronic chart was reviewed and updated as appropriate.  See Medcard for details.    Current Facility-Administered Medications:     albumin human 25% bottle 25 g, 25 g, Intravenous, BID, Theresa Alvarez MD, Stopped at 04/22/24 1007    cefTRIAXone (ROCEPHIN) 2 g in sodium chloride 0.9 % 100 mL IVPB (MB+), 2 g, Intravenous, Q24H, Theresa Alvarez MD, Stopped  at 04/22/24 0157    dextrose 10% bolus 125 mL 125 mL, 12.5 g, Intravenous, PRN, Kboi Villarreal MD    dextrose 10% bolus 250 mL 250 mL, 25 g, Intravenous, PRN, Kobi Villarreal MD    folic acid tablet 1 mg, 1 mg, Oral, Daily, Kobi Villarreal MD, 1 mg at 04/22/24 0840    glucagon (human recombinant) injection 1 mg, 1 mg, Intramuscular, PRN, Kobi Villarreal MD    glucose chewable tablet 16 g, 16 g, Oral, PRN, Kobi Villarreal MD    glucose chewable tablet 24 g, 24 g, Oral, PRN, Kobi Villarreal MD    lactulose 20 gram/30 mL solution Soln 15 g, 15 g, Oral, BID, Kobi Villarreal MD, 15 g at 04/22/24 0839    LORazepam tablet 2 mg, 2 mg, Oral, Q4H PRN, West, Antonio, DO, 2 mg at 04/21/24 2040    midodrine tablet 10 mg, 10 mg, Oral, TID, Theresa Alvarez MD, 10 mg at 04/22/24 0840    multivitamin tablet, 1 tablet, Oral, Daily, Kobi Villarreal MD, 1 tablet at 04/22/24 0840    mupirocin 2 % ointment, , Nasal, BID, Theresa Alvarez MD, Given at 04/22/24 0841    naloxone 0.4 mg/mL injection 0.02 mg, 0.02 mg, Intravenous, PRN, Kobi Villarreal MD    octreotide (SANDOSTATIN) 500 mcg in sodium chloride 0.9% 100 mL infusion, 50 mcg/hr, Intravenous, Continuous, West, Antonio, DO, Last Rate: 10 mL/hr at 04/22/24 0640, 50 mcg/hr at 04/22/24 0640    pantoprazole injection 40 mg, 40 mg, Intravenous, BID, Antonio Colindres DO, 40 mg at 04/22/24 0839    potassium chloride SA CR tablet 40 mEq, 40 mEq, Oral, Q4H, Theresa Alvarez MD, 40 mEq at 04/22/24 0839    sodium chloride 0.9% flush 10 mL, 10 mL, Intravenous, Q12H PRN, Kobi Villarreal MD    thiamine tablet 100 mg, 100 mg, Oral, Daily, Theresa Alvarez MD, 100 mg at 04/22/24 0840    Allergies:  Sulfa (sulfonamide antibiotics)    PSYCHOSOCIAL FACTORS:  Stressors (Biopsychosocial, Cultural and Environmental): physical  "health  Functioning Relationships: good relationship with spouse or significant other    STRENGTHS AND LIABILITIES:   Strength: Patient accepts guidance/feedback.  Strength: Patient is accepting of treatment.  Strength: Patient is kind.  Liability: Patient has poor health.  Liability: Patient is acutely decompensated.  Liability: Patient is in active addiction.    Additional Relevant History, As Applicable:       EXAMINATION:     BP (!) 107/56 (BP Location: Left arm, Patient Position: Lying)   Pulse 94   Temp 98.4 °F (36.9 °C) (Oral)   Resp 17   Ht 5' 6" (1.676 m)   Wt 71.7 kg (158 lb)   SpO2 97%   Breastfeeding No   BMI 25.50 kg/m²     MENTAL STATUS EXAMINATION:  General Appearance:  appears stated age, well developed and nourished, adequately groomed and appropriately dressed, in no acute distress, lying in bed, jaundiced, scleral icterus  Behavior: * normal; cooperative; reasonably friendly, pleasant, and polite; appropriate eye-contact; under good behavioral control  Involuntary Movements and Motor Activity:  no abnormal involuntary movements noted, no psychomotor agitation or retardation, no tics, no tremors, no akathisia, no dystonia, no evidence of tardive dyskinesia  Gait and Station:  unable to assess - patient lying down or seated  Speech and Language: conversational, spontaneous, speaks and understands English proficiently, intact; normal rate, rhythm, volume, tone, and pitch; conversational, spontaneous, and coherent; speaks and understands English proficiently and fluently; repeats words and phrases, no word finding difficulties are noted  Mood: "okay"  Affect:  normal, euthymic, reactive, full-range, mood-congruent, appropriate to situation and context  Thought Process and Associations:  intact; linear, goal-directed, organized, and logical; no loosening of associations noted  Thought Content and Perceptions: * no suicidal or homicidal ideation, no auditory or visual hallucinations, no paranoid " ideation, no ideas of reference, no evidence of delusions or psychosis  Sensorium:  drowsy, waxing and waning, oriented fully (to person, place, time and situation)  Recent and Remote Memory:  mild impairments noted, recent memory impaired, remote memory impaired  Attention and Concentration: easily distractible, able to spell WORLD forwards and backwards  Fund of Knowledge:  intact, correctly identifies the , incorrectly identifies the last five Presidents of the [place] (in order)  Insight:  intact, demonstrates awareness of illness and situation  Judgment:  intact, behavior is adequate/appropriate to the circumstances, compliant with health provider's recommendations and instructions      RISK MANAGEMENT:     I[]I Y  I[x]I N  I[]I U  I[]I A  Suicidal Ideation/Behavior: no passive ideation, no active ideation, no plan, no means, no intent  I[]I Y  I[x]I N  I[]I U  I[]I A  Homicidal Ideation/Behavior:  I[]I Y  I[x]I N  I[]I U  I[]I A  Violence:  I[]I Y  I[x]I N  I[]I U  I[]I A  Self-Injurious Behavior:    The patient is deemed to be a historian of unknown reliability and accuracy, with evidence of hepatic encephalopathy.    I[]I Y  I[x]I N  I[]I U  I[]I A  I[]I N/A  Minimization of Risk Parameters Suspected/Evident:  I[]I Y  I[x]I N  I[]I U  I[]I A  I[]I N/A  Exaggeration of Risk Parameters Suspected/Evident:    [] Y  [] N  Danger to Self:   [] Y  [] N  Danger to Others:   [] Y  [] N  Grave Disability:       In cases of emergency, daily coverage provided by Acute/ER Psych MD, NP, PA, or SW, with contact numbers located in [facility] On Call Schedule.    Indu Paul MD  Department of Psychiatry  [facility]      KEY:     I[]I Y = Yes / Present / Endorses  I[]I N = No / Absent / Denies  I[]I U = Unknown / Unable to Assess / Unwilling to Participate  I[]I A = Ambiguity Exists / Accuracy Uncertain  I[]I D = Denial or Minimization is  Suspected/Evident  I[]I N/A = Non-Applicable    CHART REVIEW:     Available documentation has been reviewed, and pertinent elements of the chart have been incorporated into this evaluation where appropriate.    The patient's last Epic encounter in the psychiatry department was on: Visit date not found  The patient's first Epic encounter in the psychiatry department was on:      [place]  AWARE  Site reviewed - No entries are noted.      ADVICE AND COUNSELING:     [x] In cases of emergencies (e.g. SI/HI resulting in danger to self or others, functioning deteriorates to the level of grave disability), call 911 or [phone], or present to the emergency department for immediate assistance.  [x] Individuals should not operate a motor vehicle or heavy machinery if effects of medications or underlying symptoms/condition impair the ability to safely do so.    Alcohol, Tobacco, and Drug Counseling, as well as resources, has been provided, as warranted.     Shared medical decision making and informed consent are the hallmark and bedrock of good clinical care, and as such have been employed and obtained, respectively, to the degree possible.      Risk Mitigation Strategies, Harm Reduction Techniques, and Safety Netting are important interventions that can reduce acute and chronic risk, and as such have been employed to the degree possible.    Prescription Drug Management entails the review, recommendation, or consideration without recommendation of medications, and as such was employed during the encounter.    Additional Psychoeducation has been provided, as warranted.    Discussed, to the extent possible, diagnosis, risks and benefits of proposed treatment vs alternative treatments vs no treatment, potential side effects of these treatments and the inherent unpredictability of treatment. The patient expresses understanding of the above and displays the capacity to agree with this treatment given said understanding. Patient also  agrees that, currently, the benefits outweigh the risks and consents to treatment at this time.     Written material has been provided to supplement, augment, and reinforce any discussions and interventions, via the AVS or other pre-printed handouts, as warranted.      DIAGNOSTIC TESTING:     The chart was reviewed for recent diagnostic procedures and investigations, and pertinent results are noted below.     Glu 100  4/22/2024  Li *   *  TSH *   *    HgA1c *   *  VPA *   *   FT4 *   *    Na 123 (L)  4/22/2024  CLZ *   *  WBC 10.23  4/22/2024    Cr 1.5 (H)  4/22/2024  ANC 6.6; 64.6;   4/22/2024   Hgb 7.9 (L)  4/22/2024     BUN 19  4/22/2024  Trop I 0.018  4/21/2024  HCT 21.9 (L)  4/22/2024     GFR 44.9 (A)  4/22/2024   CPK *   *  PLT 55 (L)  4/22/2024     Alb 2.1 (L)  4/22/2024   PRL *   *  B12 *   *     T Bili 20.6 (H)  4/22/2024  Chol *   *  B9 *   *      4/22/2024  TGs *   *  B1 *   *     (H)  4/22/2024  HDL *   *  Vit D *   *     ALT 40  4/22/2024  LDL *   *  HIV *   *     INR 2.3 (H)  4/21/2024  Lilian *   *   Hep C Non-reactive  4/20/2024    GGT *   *  Lip 52  4/20/2024  RPR *   *     (H)  4/22/2024   NH4 68 (H)  4/20/2024  UPT *   *      PETH *   *  THC *   *    ETOH *   *  LUIS *   *    EtG *   *  AMP *   *    ALC *   *  OPI *   *    BZO *   *  MTD *   *     BAR *   *  BUP *   *    PCP *   *  FEN *   *     Results for orders placed or performed during the hospital encounter of 04/20/24   EKG 12-lead    Collection Time: 04/20/24 11:39 PM   Result Value Ref Range    QRS Duration 108 ms    [facility] QTC Calculation 514 ms    Narrative    Test Reason : D64.9,    Vent. Rate : 109 BPM     Atrial Rate : 109 BPM     P-R Int : 150 ms          QRS Dur : 108 ms      QT Int : 382 ms       P-R-T Axes : 055 033 045 degrees     QTc Int : 514 ms    Sinus tachycardia  Otherwise normal ECG  No previous ECGs available  Confirmed by Ubaldo NIELSEN MD ([id number]) on  4/21/2024 9:44:48 AM    Referred By: ROGERIO HAIR           Confirmed By:Ubaldo NIELSEN MD       No results found for this or any previous visit.    CONSULTATION:     A diagnostic psychiatric evaluation was performed and responsiveness to treatment was assessed.  The patient demonstrates adequate ability/capacity to respond to treatment.    Inpatient consult to Psychiatry  Consult performed by: Indu Paul MD  Consult ordered by: Kobi Villarreal MD          - The case was discussed and care was coordinated with the consulting clinician, including clinical impression, assessment, and treatment recommendations.

## 2024-04-22 NOTE — ASSESSMENT & PLAN NOTE
Hyponatremia    38 year old female with history of alcohol use disorder presenting initially to [facility] with jaundice found to be in acute liver failure [place] [facility] [place]. Complications include acute cystitis as well as concern for upper GI bleed. Nephrology consulted for up-trending creatinine and hyponatremia. In terms of her PB the patient's Cr was at presumed baseline of 0.7 on 4/20 and has up-trended while admitted to 1.5 on 4/22. Given timing of Cr elevation concern for offending agent given while admitted. She was on vancomycin with a supratherapeutic vanc trough on 4/22 (vanc since discontinued). Additionally, she received IV contrast w/ CTA on 4/20. No other nephrotoxic medications identified. Additional etiologies include pre-renal (as evidenced by urine Na < 10). Her MAPs have been > 65 since admission, do not suspect ischemic injury however she may have low effective circulatory volume if HRS is present. Bilirubin induced nephropathy also on differential. Would recommend IV albumin x 2 days to evaluate for HRS. If no improvement in Cr then favor HRS as contributing etiology.    In terms of hyponatremia Na improving to 124 on 4/22 from 120 on 4/20. Suspect secondary to liver dysfunction. Would recommend 1.5L fluid restriction.    -- Agree with holding diuresis while evaluating for HRS  -- Continue IV albumin 25 g BID x 2 days; if no improvement then HRS more likely  -- Trend Cr daily  -- Avoid nephrotoxic medications and IV contrast   -- Agree with stopping vancomycin  -- Fluid restriction of 1L daily for hyponatremia  -- Trend Na daily  -- IF no improvement in Cr then will evaluate urine sediment

## 2024-04-22 NOTE — PROGRESS NOTES
Pharmacokinetic Assessment Follow Up: IV Vancomycin    Vancomycin serum concentration assessment(s):    The trough level was drawn correctly and can be used to guide therapy at this time. The measurement is above the desired definitive target range of 15 to 20 mcg/mL.    Vancomycin Regimen Plan:    Discontinue the scheduled vancomycin regimen and re-dose when the random level is less than 20 mcg/mL, next level to be drawn at 0430 with morning labs on 04/23/24.    Drug levels (last 3 results):  Recent Labs   Lab Result Units 04/22/24  0306   Vancomycin-Trough ug/mL 30.3*       Pharmacy will continue to follow and monitor vancomycin.    Please contact pharmacy at extension [phone] for questions regarding this assessment.    Thank you for the consult,   Shayla Bean       Patient brief summary:  Carola Tovar is a 38 y.o. female initiated on antimicrobial therapy with IV Vancomycin for treatment of sepsis        Drug Allergies:   Review of patient's allergies indicates:   Allergen Reactions    Sulfa (sulfonamide antibiotics) Hives       Actual Body Weight:   71.8 kg    Renal Function:   Estimated Creatinine Clearance: 50.6 mL/min (A) (based on SCr of 1.5 mg/dL (H)).,     Dialysis Method (if applicable):  N/A    CBC (last 72 hours):  Recent Labs   Lab Result Units 04/20/24  1437 04/20/24  1947 04/21/24  0442 04/21/24  1114 04/21/24  1748 04/22/24  0059   WBC K/uL 12.47 10.71 9.95 10.71 11.54 11.42   Hemoglobin g/dL 6.9* 6.2* 8.6* 8.3* 8.5* 7.8*   Hematocrit % 19.1* 17.5* 23.9* 23.2* 22.7* 21.2*   Platelets K/uL 42* 60* 53* 54* 53* 49*   Gran % % 76.2* 74.0* 71.7 71.0 72.0  --    Lymph % % 7.4* 9.2* 9.8* 8.4* 8.5*  --    Mono % % 14.6 14.4 15.2* 17.2* 16.8*  --    Eosinophil % % 0.7 1.1 1.7 1.7 1.2  --    Basophil % % 0.2 0.2 0.5 0.5 0.3  --    Differential Method  Automated Automated Automated Automated Automated  --        Metabolic Panel (last 72 hours):  Recent Labs   Lab Result Units 04/20/24  1436 04/20/24  2660  04/21/24  0443 04/21/24  1114 04/21/24  1447 04/21/24  1748 04/21/24 2050 04/22/24  0059   Sodium mmol/L 120* 122* 118* 120* 121* 120*  --  120*   Sodium, Urine mmol/L  --   --   --   --   --   --  <10*  --    Potassium mmol/L 2.7* 2.9* 3.8 3.6 3.3* 3.2*  --  2.8*   Chloride mmol/L 77* 78* 79* 81* 82* 83*  --  82*   CO2 mmol/L 21* 22* 26 24 27 26  --  28   Glucose mg/dL 89 104 114* 123* 127* 111*  --  119*   Glucose, UA   --   --   --   --   --   --  Negative  --    BUN mg/dL 11 12 13 16 15 16  --  18   Creatinine mg/dL 0.7 0.8 0.8 1.1 1.2 1.3  --  1.5*   Albumin g/dL 2.4*  --  2.3* 2.3*  --  2.3*  --  2.2*   Total Bilirubin mg/dL 21.7*  --  22.1* 23.9*  --  23.1*  --  21.8*   Alkaline Phosphatase U/L 161*  --  151* 140*  --  143*  --  129   AST U/L 133*  --  112* 114*  --  107*  --  110*   ALT U/L 47*  --  41 42  --  37  --  40   Magnesium mg/dL 1.7  --  2.2  --  2.2  --   --   --    Phosphorus mg/dL 3.5  --  2.5*  --  2.3*  --   --   --        Vancomycin Administrations:  vancomycin given in the last 96 hours                     vancomycin 1,250 mg in sodium chloride 0.9% 250 mL IVPB (Vial-Mate) (mg) 1,250 mg New Bag 04/21/24 1615     1,250 mg New Bag  0325    vancomycin 1,500 mg in sodium chloride 0.9% 250 mL IVPB (Vial-Mate) (mg) 1,500 mg New Bag 04/20/24 1530                    Microbiologic Results:  Microbiology Results (last 7 days)       Procedure Component Value Units Date/Time    Urine culture [[id number]] Collected: 04/21/24 2050    Order Status: No result Specimen: Urine Updated: 04/21/24 2242    Blood culture [[id number]] Collected: 04/20/24 1539    Order Status: Completed Specimen: Blood from Peripheral, Lower Arm, Left Updated: 04/21/24 1812     Blood Culture, Routine No Growth to date      No Growth to date    Blood culture [[id number]] Collected: 04/20/24 1527    Order Status: Completed Specimen: Blood from Peripheral, Upper Arm, Left Updated: 04/21/24 1812     Blood Culture, Routine No Growth to  date      No Growth to date

## 2024-04-22 NOTE — DISCHARGE INSTRUCTIONS
REFERRAL RECOMMENDATIONS FOR ALCOHOL USE DISORDER      12 STEP PROGRAMS (and similar):     Alcoholics Anonymous (local)  [x] [phone]  [x] www.[place].org for schedules for in-person and online meetings  [x] There are AA meetings throughout the day all over town  [x] AA costs nothing to attend; they pass a basket for donations but this is not required    Alcoholics Anonymous Online Intergroup (national)  [x] www.aa-intergroup.org  [x] Good resource for large, nation-wide meetings  [x] Can also attend smaller, local meetings in other cities  [x] Countless meetings all day and all night  [x] AA costs nothing to attend; they pass a basket for donations but this is not required    Flying Sober - 24/7 zoom meetings for women and coed - sign on anytime, anywhere!  https://CanopisobHeroic/80-1-gmjnuorq/    Online Intergroup of AA - 121 Open AA [place] Meeting - 24/7 zoom meetings  https://aa-intergroup.org/meetings/    LOOKING FOR AN ALTERNATIVE TO 12 STEP PROGRAMS - check out:  SMART Recovery: https://www.smartrecovery.org/about-us  Matt Recovery: https://recoverydharma.org      DETOX UNITS (USUALLY 5-7 DAYS):     [facility] [place]: [address], [place]  [phone], call first to ensure bed availability    [facility]: [address] [place]  [phone], Option 1, call first to ensure bed availability    [place] [facility]: [facility] [address] Almonte, [place]  [phone] (intake by appointment only)    [facility]: [address], [place]  [phone]      INTENSIVE OUTPATIENT PROGRAMS:     [facility] RECOVERY PROGRAM (formerly known as the ABU)  [x] [phone], Option 2  [x] [address] 4th Floor, [place] [address]  [x] https://www.ochsner.org/services/[facility]-recovery-program  [x] [facility] delivers comprehensive and collaborative treatment for alcohol and substance use disorders.  Excellent program for working professionals or  anyone else seeking recovery.  [x] Requires insurance approval prior to starting program, call number above for more information.  [x] Intensive Outpatient Rehabilitation Program - M-F 9am-3pm - daily groups with psychologists and social workers, sessions with MDs 3x per week   [x] Ambulatory detox and dual diagnosis available    [facility] Intensive Outpatient Program  [x] [phone]  [x] [address] [facility] (the clinic not on [facility])  [x] Call number above for more info and to check insurance requirements    [facility]  [address] [place], [place] [address] [phone]    [facility]:  [address], [address] [place], [place] [address]?, [phone]  [address] [facility]?, [place], [place] [address]?, [phone]    RESIDENTIAL REHABS (USUALLY 28 DAYS):     [facility]: [address], [phone]    [facility]: [address] , [facility]  [phone] (intake by appointment only)    [facility] (men only) [address] [facility], [phone]    [facility] (Female only) [address] [facility], [phone]    [facility]: [address], [facility], men's program [phone], women's program [phone]    [facility]: [address], [facility], [phone]    [facility]: [address] [place], [place], [phone]    [facility]: Men only, [phone], [address] [place] [facility]: [address], [place], [place], [phone]    [facility]: [facility] [place], [place],  [phone]  New Location: [address] [place] [address] [address], [place] [address], [phone]    [facility]:   ?[address]?[place], [place] [address]?[phone]    [facility]: [address], [place] [address], [phone]    [place]: [place], [phone]     [facility]: [place], [place], [phone]    [address]: Emmanuel  ANTONIO Posada, [phone]    [facility]: [employer] [place], [place], [phone]    [facility] [place]: [place], [place], [phone]    [facility] [place]: [address], [place] [address], [phone]    COMMUNITY ADDICTION CLINICS:     [facility]: [address], [address] [place], [phone] -or- [address] [place] [address]    [facility] [place]: [address] [facility], [place]  [address]     [facility]: Clinics [phone]; Crisis [phone]    [facility]: [address] [place] [address]    [facility]: [address] [place] [address]    [facility]: [address] [place] [address],    [facility]: 2nd Floor [address] [place] [address]    [facility]: [address] [facility] [place] [address]    [facility], [address] [place] [address]    [facility]: [address] [place] [address], [place] [phone]  (serves youth 16-23 years old)    [facility]: [facility] [phone]    [facility]: [address] [facility], [place] [phone]    [facility]: [address], [place] [phone]    [facility]: [address] [facility], [address], [phone]     [facility]: [address] [facility], [phone], [phone]    RESOURCES IN OTHER [facility]:     [facility]: [address] [place], [phone], [phone]    [facility]: [address] [facility] [address], [address], [phone]    [facility], [address] [place] [address], [phone]    [facility]: [address], [place], [phone]    [facility]  "[facility], [address] [facility], [phone] ([facility])    [facility], [address] [facility], [phone] ([facility])    [facility], [address], [place], [phone] ([place] [place], [facility])    [facility], [address], [place], [phone] ([facility])    [facility] Hotline [phone], [phone]    [facility], [address] [facility], [place] [facility], [address] [facility], [address], [place] [facility], [address] [facility], [facility], [address] [facility] [address] [facility], [address], [facility], [address], [address] [phone]    [facility], [address] [facility] [phone]    [facility], [address] [facility], [phone]    [facility] for Addiction Recovery, [address] [place] [address], [phone]      Swiss SPEAKING (en español):     Información de la reunión de Alcohólicos Anónimos  [facility] [place] [facility], 10:00 am  Habla español  Esta reunión está abierta y cualquiera puede asistir.    Portuguese speaking Alcoholics anonymous meetings:  El "Harpal [place] AA Skype" es un harpal on line de Alcohólicos Anónimos en español. El harpal es barbara, gratuito y virtual a través de Skype Audio. El harpal funciona mediante kenan llamada grupal de voz, por lo que no se utiliza la videollamada, ni se pueden bill las imágenes o rostros de los participantes. Hace master años y medio abrimos el primer Harpal de AA por [place] en [place], charis actualmente asisten personas desde [place], [place], [place], [place], [place], [place], [place], [place], [place], [place], " [place] y [place], entre otros.    El linda es muy útil para los alcohólicos que residen en lugares donde no se celebran reuniones de AA, o residen en lugares donde las reuniones de AA son un número limitado de días a la semana, o para aquellos compañeros que se hayan de viaje o que, por cualquier motivo, se hayan convalecientes y no pueden desplazarse. Todos los días nos reuniones a las 21:00 (hora española)    Podéis obtener más información sobre el linda y khurram sesiones en la págXIFIN web [email or web address]      MENTAL HEALTH/ADDICTIVE DISORDERS:     AA ([phone]), NA ([phone])   National Suicide Prevention Lifeline- Call 1-424.969.9006 Available 24 hours [facility] [phone]; Crisis Line [phone] - Call for options A-F:  Intensive Outpatient Treatment/ Day programs   [facility], please contact   [facility], please contact [phone] or [phone] to speak with an admissions counselor.  [facility] Group (Methadone Maintenance)   [address] [place] [address], [phone]  [facility] [place] [address] [phone]  [facility], [address] [place] [address], [phone]  [facility] [phone]  [place] [facility] [place] please contact [phone]  [facility], [facility] [facility], 4th floor [place]TONIO [place] [address] Phone: [phone]   [facility]  [place] Office: [address] [address], [phone]  [place] Office: [address] [facility] [address], [place] [address], [phone]  [place] Office: [address] [facility] [place] [address] [phone]    Outpatient Substance Abuse Treatment   Behavioral Health Group (Methadone Maintenance)   [facility] [place] [address], [phone]  [address] [place] [address] [phone]  [facility], [address] [place] [address], [phone]  [facility]  [place] Office: [address] [address], [phone]  [place] Office: [address] [facility], [address], [place] [address], [phone]  [place] Office: [address] [place] [address] [phone]  [facility], [address] [place] [address] [phone]   [facility], [address] [facility], [address], [phone]  [facility], [address] [place], [place] [phone]    Residential Substance Abuse Treatment   [facility] [address] [facility], [phone]  [facility], [address] [facility], [phone]  [facility] (men only) [address] [place] [address], [phone]  [facility] at the [facility] (women only) [address] [place] [address] [phone]  [facility], [address] [place] [address] [phone]  formerly [facility] (women only), intakes at [address] [facility], [phone]  [facility] (7-day program, $100, [address], [phone], [phone], [phone])  [facility] (Men only, [phone]), [address], Fran (Vets*/Non-Vets)  [facility] (Men only, $400/month program fee) [address] [facility] Jane [place], [phone]  [facility] (Women over age 39 only), [address], [phone]    Out of Area:    [place], [address] [place] [address] 36, [place], [place] [phone])  [facility] (men only), [address] [place] [place] [address], [phone]  [facility], [address] [place], [place] ([phone])  [address] [place], [place] ([phone])  [facility], [address] [place] [address], [phone]  [facility] (Women only, has to have mental illness, can be homeless or substance abuser), [phone]      [place] RESOURCES:     [place] [facility] Crisis Response  Team:    Region 12 ([place], [place], [place], and [facility]) ([facility])  [phone]      Outpatient Mental Health & Addiction Clinic Resources for both [facility]:    [facility] Mental Healthcare Resources  Website: www.[facility].org  Main Number: [phone]    [facility] ([facility])  [address] ([address] [facility]) [place] [address]  [phone]    [facility] ([facility])  [address] ([address] [facility]) [place] [address]  [phone]    [facility]  [address] ([address]) [place] [address]  [phone]    [facility]  [address] ([address] [facility] [place] [place] [address]  [phone]      Addiction Treatment Resources for both [facility]:    [facility] (Detox, Residential, PHP, IOP, and Aftercare Programs)  [address] [place], [place] [address] [phone]    [facility] (Residential, IOP, Transitional Living, and Aftercare Programs)  [address] [facility] [place] [address]  [phone]    [facility] (Inpatient, Residential, Detox, IOP, Outpatient, and Aftercare Programs)  [address] [place] [address] [phone] or toll free at [phone]      Outpatient Mental Health Psychotherapy Resources for both [facility]:    Alma Byers, ROLANDAW  [address]  [place] [address]  [phone]  Specialties: Depression, Anxiety, and Life Transitions    Kaci Whaley, PhD  [address]  [place] [address]  [phone]  Specialties: Testing and Evaluation, Education and Learning Disabilities, and ADHD    Deb Sarmienot, ROLANDAW [facility] [address] [place] [address]  [phone]  Specialties:  Obsessive-Compulsive (OCD), Depression, and Relationship Issues    Ria Hurley LPC [address] [place] [address]  [phone]  Specialties: Trauma & PTSD, Mood Disorders, and Anxiety    Ria Traylor, PhD, [facility] [address] [place] [address], [place] [address] [phone]  Specialties: Family Conflict, Child, and Relationship Issues    Bisi Reilly LPC Counseling [place], [place] [address] [phone]  Specialties: Anxiety, Depression, and Anger Management        IN CASE OF SUICIDAL THINKING, call the National Suicide Hotline Number: 988    988 Suicide & Crisis Lifeline: 988 , 2-5360-001-871-TALK (1990)  Provides 24/7, free and confidential support for people in distress, prevention and crisis resources for you or your loved ones, and best practices for professionals.    Call, text or chat.  https://988Hungerstation.comline.org

## 2024-04-22 NOTE — SUBJECTIVE & OBJECTIVE
Interval History: Pt appears ill this morning. States she is feeling very confused, and is tearful on exam. A&O x 4. Is not in pain. Has been receiving prn Ativan. Nephrology consulted per hepatology recommendations    Review of Systems  Objective:     Vital Signs (Most Recent):  Temp: 98.8 °F (37.1 °C) (04/22/24 1156)  Pulse: 93 (04/22/24 1156)  Resp: 17 (04/22/24 1156)  BP: (!) 93/50 (04/22/24 1156)  SpO2: (!) 88 % (04/22/24 1156) Vital Signs (24h Range):  Temp:  [98.4 °F (36.9 °C)-99.8 °F (37.7 °C)] 98.8 °F (37.1 °C)  Pulse:  [] 93  Resp:  [17-19] 17  SpO2:  [83 %-97 %] 88 %  BP: ()/(43-66) 93/50     Weight: 71.7 kg (158 lb)  Body mass index is 25.5 kg/m².    Intake/Output Summary (Last 24 hours) at 4/22/2024 1406  Last data filed at 4/22/2024 0532  Gross per 24 hour   Intake 1137.83 ml   Output 150 ml   Net 987.83 ml         Physical Exam  Vitals reviewed.   Constitutional:       General: She is not in acute distress.  HENT:      Head: Normocephalic and atraumatic.   Eyes:      General: Scleral icterus present.      Extraocular Movements: Extraocular movements intact.      Pupils: Pupils are equal, round, and reactive to light.   Cardiovascular:      Rate and Rhythm: Normal rate and regular rhythm.      Heart sounds: No murmur heard.  Pulmonary:      Effort: Pulmonary effort is normal. No respiratory distress.      Breath sounds: No wheezing or rales.   Abdominal:      General: Abdomen is flat. There is distension.      Palpations: Abdomen is soft.      Tenderness: There is no abdominal tenderness.   Musculoskeletal:         General: No swelling or tenderness. Normal range of motion.      Cervical back: Normal range of motion.      Right lower leg: No edema.      Left lower leg: No edema.   Skin:     General: Skin is warm and dry.      Coloration: Skin is jaundiced.      Findings: Bruising present.      Comments: Spider angioma in the chest    Neurological:      General: No focal deficit present.       Mental Status: She is alert and oriented to person, place, and time.      Comments: Asterixis   Psychiatric:      Comments: Tearful             Significant Labs: All pertinent labs within the past 24 hours have been reviewed.    Significant Imaging: I have reviewed all pertinent imaging results/findings within the past 24 hours.

## 2024-04-22 NOTE — PLAN OF CARE
Pt AAOx4, no c/o of pain reported. Pt is jaundiced. Pt is on her menstrual cycle, noted some blood and clot in her urine. Kept NPO after midnight for EGD in the morning per order. Octreotide gtt infusing. VSS, no acute distress noted. POC on going.    Problem: Adult Inpatient Plan of Care  Goal: Plan of Care Review  Outcome: Ongoing, Progressing  Goal: Absence of Hospital-Acquired Illness or Injury  Outcome: Ongoing, Progressing  Goal: Optimal Comfort and Wellbeing  Outcome: Ongoing, Progressing  Goal: Readiness for Transition of Care  Outcome: Ongoing, Progressing

## 2024-04-22 NOTE — PROGRESS NOTES
[facility] - Telemetry Stepdown  Gastroenterology  Progress Note    Patient Name: Carola Tovar  MRN: [id number]  Admission Date: 4/20/2024  Hospital Length of Stay: 2 days  Code Status: Full Code   Attending Provider: Theresa Alvarez MD  Consulting Provider: Tamara Perry MD  Primary Care Physician: No, Primary Doctor  Principal Problem: Decompensated hepatic cirrhosis        Subjective:     Interval History:   Told RN this morning she feels like she is withdrawing from alcohol.     Objective:     Vital Signs (Most Recent):  Temp: 98.8 °F (37.1 °C) (04/22/24 1156)  Pulse: 93 (04/22/24 1156)  Resp: 17 (04/22/24 1156)  BP: (!) 93/50 (04/22/24 1156)  SpO2: (!) 88 % (04/22/24 1156) Vital Signs (24h Range):  Temp:  [98.4 °F (36.9 °C)-99.8 °F (37.7 °C)] 98.8 °F (37.1 °C)  Pulse:  [] 93  Resp:  [17-19] 17  SpO2:  [83 %-97 %] 88 %  BP: ()/(43-66) 93/50     Weight: 71.7 kg (158 lb) (04/22/24 0815)  Body mass index is 25.5 kg/m².      Intake/Output Summary (Last 24 hours) at 4/22/2024 1210  Last data filed at 4/22/2024 0532  Gross per 24 hour   Intake 1137.83 ml   Output 150 ml   Net 987.83 ml       Lines/Drains/Airways       Peripheral Intravenous Line  Duration                  Peripheral IV - Single Lumen 04/20/24 1322 Posterior;Right Hand 1 day         Peripheral IV - Single Lumen 04/20/24 1708 Anterior;Proximal;Right Forearm 1 day         Peripheral IV - Single Lumen 04/20/24 2200 20 G Left Antecubital 1 day                     Physical Exam  Vitals reviewed.   Constitutional:       General: She is not in acute distress.     Appearance: She is ill-appearing.   HENT:      Head: Normocephalic.   Eyes:      General: No scleral icterus.     Extraocular Movements: Extraocular movements intact.   Cardiovascular:      Rate and Rhythm: Normal rate.   Pulmonary:      Effort: Pulmonary effort is normal.   Skin:     Coloration: Skin is jaundiced.   Neurological:      Mental Status: She is alert. Mental status is at  baseline.          Significant Labs:  All pertinent lab results from the last 24 hours have been reviewed.      Assessment/Plan:     GI  * Decompensated hepatic cirrhosis  Carola Tovar is a 38 y.o. female with history of alcohol use disorder who presents with alcoholic hepatitis superimposed on decompensated cirrhosis. Relapsed last year after 4 year period of sobriety. Did drink despite knowledge of cirrhosis discovered last year after relapse. MELD 33 with significant hyponatremia but fortunately not encephalopathic and with intact renal function. Concerned for history of relapse and ongoing drinking despite knowledge of liver disease. GI consulted for melena.      Problem List:  Decompensated alcoholic cirrhosis  Alcoholic hepatitis  Melena  Hyponatremia  PB     Recommendations:  - She is currently not a candidate for liver transplant evaluation as she has continued to consume alcohol despite knowledge of her liver diease. Additionally, she does not have insurance which is a significant barrier.   - For hyponatremia: no free water. Only milk or boost/ensure. Would fluid restrict to 1 L.   - Nephrology consult given worsening kidney function and hyponatremia.   - Follow up PETH.   - Addiction psych consult.  - Timing of EGD per GI. Continue IV PPI, octreotide, and CTX  - Low salt, high protein diet  - No accessible pocket of ascites on US per primary team. Can hold off on paracentesis.  - Lactulose titrated to 2-3 BMs per day  - Daily CBC, CMP, INR.    Thank you for your consult. I will follow-up with patient. Please contact us if you have any additional questions.    Tamara Perry MD  Gastroenterology Fellow  [facility] - Telemetry Stepdown

## 2024-04-22 NOTE — HPI
The patient is a 38 year old female with a history of alcohol use disorder who presented to an OSH in [place] with jaundice and scleral icterus. She consumed about 48 oz of vodka daily, decreased to 16 oz per day. Her last drink was 4/19/2024. At the OSH she was found to be in acute liver failure. She was transferred to [facility] for evaluation for liver transplant. She was admitted to hospital medicine. Her UA was concerning for 1+ protein, 3+ blood, 2+ bilirubin, > 100 RBC, 78 WBC. She was started on Vancomycin + ceftriaxone and found to have an elevated vanc trough on 4/22 to 30.3. Additionally she is being evaluated by GI given concern for upper GI bleed due to reported melena. CTA showing: Geographic areas of hypoattenuation involving the left greater than right kidneys on nonenhanced/precontrast series, nonspecific. Findings are nonspecific and of uncertain etiology, could reflect sequela of concentrated urine or metabolic derangement. Nephrology was consulted 4/22 due to up-trending creatinine and persistent hyponatremia. Concern for HRS vs alternative etiology of PB.    The patient reports recent NSAID use. She states that she had dysuria and urinary frequency prior to admission but clarifies that the volume of her urine also decreased during the days prior to admission. The urine became dark in color as well prior to admit.

## 2024-04-22 NOTE — SUBJECTIVE & OBJECTIVE
No past medical history on file.    No past surgical history on file.    Review of patient's allergies indicates:   Allergen Reactions    Sulfa (sulfonamide antibiotics) Hives     Current Facility-Administered Medications   Medication Dose Route Frequency Provider Last Rate Last Admin    albumin human 25% bottle 25 g  25 g Intravenous BID Theresa Alvarez MD   Stopped at 04/22/24 1007    cefTRIAXone (ROCEPHIN) 2 g in sodium chloride 0.9 % 100 mL IVPB (MB+)  2 g Intravenous Q24H Theresa Alvarez MD   Stopped at 04/22/24 0157    dextrose 10% bolus 125 mL 125 mL  12.5 g Intravenous PRN Kobi Villarreal MD        dextrose 10% bolus 250 mL 250 mL  25 g Intravenous PRN Kobi Villarreal MD        folic acid tablet 1 mg  1 mg Oral Daily Kobi Villarreal MD   1 mg at 04/22/24 0840    glucagon (human recombinant) injection 1 mg  1 mg Intramuscular PRN Kobi Villarreal MD        glucose chewable tablet 16 g  16 g Oral PRN Kobi Villarreal MD        glucose chewable tablet 24 g  24 g Oral PRN Kobi Villarreal MD        lactulose 20 gram/30 mL solution Soln 15 g  15 g Oral BID Kobi Villarreal MD   15 g at 04/22/24 0839    LORazepam tablet 2 mg  2 mg Oral Q4H PRN Antonio Colindres DO   2 mg at 04/22/24 1116    midodrine tablet 10 mg  10 mg Oral TID Theresa Alvarez MD   10 mg at 04/22/24 0840    multivitamin tablet  1 tablet Oral Daily Kobi Villarreal MD   1 tablet at 04/22/24 0840    mupirocin 2 % ointment   Nasal BID Theresa Alvarez MD   Given at 04/22/24 0841    naloxone 0.4 mg/mL injection 0.02 mg  0.02 mg Intravenous PRN Kobi Villarreal MD        octreotide (SANDOSTATIN) 500 mcg in sodium chloride 0.9% 100 mL infusion  50 mcg/hr Intravenous Continuous [place], [place], DO 10 mL/hr at 04/22/24 0640 50 mcg/hr at 04/22/24 0640    pantoprazole injection 40 mg  40 mg Intravenous BID [place],  DO Antonio   40 mg at 04/22/24 0839    potassium chloride SA CR tablet 40 mEq  40 mEq Oral Q4H Theresa Alvarez MD   40 mEq at 04/22/24 0839    sodium chloride 0.9% flush 10 mL  10 mL Intravenous Q12H PRN Kobi Villarreal MD        thiamine tablet 100 mg  100 mg Oral Daily Theresa Alvarez MD   100 mg at 04/22/24 0840     Family History    None       Tobacco Use    Smoking status: Not on file    Smokeless tobacco: Not on file   Substance and Sexual Activity    Alcohol use: Not on file    Drug use: Not on file    Sexual activity: Not on file     Review of Systems   Constitutional:  Positive for activity change and fatigue. Negative for chills and fever.   HENT:  Negative for congestion and rhinorrhea.    Eyes:  Negative for pain and visual disturbance.   Respiratory:  Negative for cough and wheezing.    Cardiovascular:  Negative for chest pain, palpitations and leg swelling.   Gastrointestinal:  Positive for abdominal distention and blood in stool. Negative for constipation, diarrhea, nausea and vomiting.   Genitourinary:  Positive for decreased urine volume and dysuria. Negative for flank pain and hematuria.   Musculoskeletal:  Negative for back pain and myalgias.   Skin:  Positive for color change. Negative for wound.   Neurological:  Negative for dizziness, speech difficulty and headaches.   Psychiatric/Behavioral:  Positive for confusion. Negative for agitation. The patient is not nervous/anxious.      Objective:     Vital Signs (Most Recent):  Temp: 98.8 °F (37.1 °C) (04/22/24 1156)  Pulse: 93 (04/22/24 1156)  Resp: 17 (04/22/24 1156)  BP: (!) 93/50 (04/22/24 1156)  SpO2: (!) 88 % (04/22/24 1156) Vital Signs (24h Range):  Temp:  [98.4 °F (36.9 °C)-99.8 °F (37.7 °C)] 98.8 °F (37.1 °C)  Pulse:  [] 93  Resp:  [17-19] 17  SpO2:  [83 %-97 %] 88 %  BP: ()/(43-66) 93/50     Weight: 71.7 kg (158 lb) (04/22/24 0815)  Body mass index is 25.5 kg/m².  Body surface area is 1.83 meters  squared.    I/O last 3 completed shifts:  In: 1690.3 [P.O.:250; I.V.:332.7; Blood:552.5; IV Piggyback:555.2]  Out: 150 [Urine:150]     Physical Exam  Vitals reviewed.   Constitutional:       General: She is not in acute distress.  HENT:      Head: Normocephalic and atraumatic.   Eyes:      General: Scleral icterus present.      Extraocular Movements: Extraocular movements intact.      Pupils: Pupils are equal, round, and reactive to light.   Cardiovascular:      Rate and Rhythm: Normal rate and regular rhythm.      Heart sounds: No murmur heard.  Pulmonary:      Effort: Pulmonary effort is normal. No respiratory distress.      Breath sounds: No wheezing or rales.   Abdominal:      General: Abdomen is flat. There is distension.      Palpations: Abdomen is soft.      Tenderness: There is no abdominal tenderness.   Musculoskeletal:         General: No swelling or tenderness. Normal range of motion.      Cervical back: Normal range of motion.      Right lower leg: No edema.      Left lower leg: No edema.   Skin:     General: Skin is warm and dry.      Coloration: Skin is jaundiced.      Findings: Bruising present.      Comments: Telangiectasias on chest   Neurological:      Mental Status: She is alert and oriented to person, place, and time. Mental status is at baseline.   Psychiatric:      Comments: Tearful          Significant Labs:  CBC:   Recent Labs   Lab 04/22/24  0730 04/22/24  1213   WBC 10.23 8.90   RBC 2.02* 2.00*   HGB 7.9* 7.6*   HCT 21.9* 20.9*   PLT 55*  --    * 105*   MCH 39.1* 38.0*   MCHC 36.1* 36.4*     Recent Labs   Lab 04/21/24 2050   COLORU Yellow   SPECGRAV >1.030*   PHUR 6.0   PROTEINUA 1+*   BACTERIA Many*   NITRITE Negative   LEUKOCYTESUR Trace*   HYALINECASTS 0     All labs within the past 24 hours have been reviewed.    Significant Imaging:  CT: Reviewed

## 2024-04-22 NOTE — ASSESSMENT & PLAN NOTE
Pt presenting to [facility] [place] for acute on chronic liver failure. Has a history of alcohol use disorder. Physical exam remarkable for right CVA tenderness, jaundice, sclera icterus. Reports having melena on her stool. Hgb 6.9 and plts 42.     CTA negative for any intraabdominal bleeding.  Cirrhotic configuration liver with stigmata of portal venous hypertension and moderate volume abdominopelvic ascites. Relatively diffuse colonic wall thickening with submucosal edema could relate to portal colopathy.    Plan  - GI consulted, appreciate recs  - Trend Hgb q6hrs. Transfuse for Hgb <7, unless otherwise indicated  - Maintain IV access with 2 large bore Ivs  - Intravascular resuscitation/support with IVFs   - Hold all NSAIDs and anticoagulants, unless contraindicated  - Bolus IV pantoprazole 80mg followed by 40mg BID  - Please correct any coagulopathy with platelets and FFP for goal of platelets >50K and INR <2.0  - Please notify GI team if there is significant change in patient's clinical status

## 2024-04-23 LAB
ALBUMIN SERPL BCP-MCNC: 2.3 G/DL (ref 3.5–5.2)
ALP SERPL-CCNC: 124 U/L (ref 55–135)
ALP SERPL-CCNC: 136 U/L (ref 55–135)
ALP SERPL-CCNC: 147 U/L (ref 55–135)
ALT SERPL W/O P-5'-P-CCNC: 38 U/L (ref 10–44)
ALT SERPL W/O P-5'-P-CCNC: 38 U/L (ref 10–44)
ALT SERPL W/O P-5'-P-CCNC: 39 U/L (ref 10–44)
ANION GAP SERPL CALC-SCNC: 11 MMOL/L (ref 8–16)
ANION GAP SERPL CALC-SCNC: 11 MMOL/L (ref 8–16)
ANION GAP SERPL CALC-SCNC: 8 MMOL/L (ref 8–16)
AST SERPL-CCNC: 107 U/L (ref 10–40)
AST SERPL-CCNC: 109 U/L (ref 10–40)
AST SERPL-CCNC: 115 U/L (ref 10–40)
BACTERIA UR CULT: ABNORMAL
BASOPHILS # BLD AUTO: 0.06 K/UL (ref 0–0.2)
BASOPHILS NFR BLD: 0.7 % (ref 0–1.9)
BILIRUB SERPL-MCNC: 17.7 MG/DL (ref 0.1–1)
BILIRUB SERPL-MCNC: 18 MG/DL (ref 0.1–1)
BILIRUB SERPL-MCNC: 18.6 MG/DL (ref 0.1–1)
BNP SERPL-MCNC: 1054 PG/ML (ref 0–99)
BUN SERPL-MCNC: 14 MG/DL (ref 6–20)
BUN SERPL-MCNC: 15 MG/DL (ref 6–20)
BUN SERPL-MCNC: 15 MG/DL (ref 6–20)
CALCIUM SERPL-MCNC: 7.7 MG/DL (ref 8.7–10.5)
CALCIUM SERPL-MCNC: 7.9 MG/DL (ref 8.7–10.5)
CALCIUM SERPL-MCNC: 8 MG/DL (ref 8.7–10.5)
CHLORIDE SERPL-SCNC: 95 MMOL/L (ref 95–110)
CO2 SERPL-SCNC: 26 MMOL/L (ref 23–29)
CO2 SERPL-SCNC: 28 MMOL/L (ref 23–29)
CO2 SERPL-SCNC: 30 MMOL/L (ref 23–29)
CREAT SERPL-MCNC: 1 MG/DL (ref 0.5–1.4)
CREAT SERPL-MCNC: 1 MG/DL (ref 0.5–1.4)
CREAT SERPL-MCNC: 1.2 MG/DL (ref 0.5–1.4)
CREAT UR-MCNC: 179 MG/DL (ref 15–325)
DIFFERENTIAL METHOD BLD: ABNORMAL
EOSINOPHIL # BLD AUTO: 0.1 K/UL (ref 0–0.5)
EOSINOPHIL # BLD AUTO: 0.1 K/UL (ref 0–0.5)
EOSINOPHIL # BLD AUTO: 0.2 K/UL (ref 0–0.5)
EOSINOPHIL NFR BLD: 1.1 % (ref 0–8)
EOSINOPHIL NFR BLD: 1.5 % (ref 0–8)
EOSINOPHIL NFR BLD: 1.7 % (ref 0–8)
ERYTHROCYTE [DISTWIDTH] IN BLOOD BY AUTOMATED COUNT: ABNORMAL % (ref 11.5–14.5)
EST. GFR  (NO RACE VARIABLE): 58.7 ML/MIN/1.73 M^2
EST. GFR  (NO RACE VARIABLE): >60 ML/MIN/1.73 M^2
EST. GFR  (NO RACE VARIABLE): >60 ML/MIN/1.73 M^2
GLUCOSE SERPL-MCNC: 103 MG/DL (ref 70–110)
GLUCOSE SERPL-MCNC: 116 MG/DL (ref 70–110)
GLUCOSE SERPL-MCNC: 78 MG/DL (ref 70–110)
HCT VFR BLD AUTO: 21.4 % (ref 37–48.5)
HCT VFR BLD AUTO: 22.8 % (ref 37–48.5)
HCT VFR BLD AUTO: 23.7 % (ref 37–48.5)
HGB BLD-MCNC: 7.3 G/DL (ref 12–16)
HGB BLD-MCNC: 7.9 G/DL (ref 12–16)
HGB BLD-MCNC: 8 G/DL (ref 12–16)
IMM GRANULOCYTES # BLD AUTO: 0.11 K/UL (ref 0–0.04)
IMM GRANULOCYTES NFR BLD AUTO: 1.2 % (ref 0–0.5)
IMM GRANULOCYTES NFR BLD AUTO: 1.3 % (ref 0–0.5)
IMM GRANULOCYTES NFR BLD AUTO: 1.3 % (ref 0–0.5)
INFLUENZA A, MOLECULAR: NOT DETECTED
INFLUENZA B, MOLECULAR: NOT DETECTED
INR PPP: 1.9 (ref 0.8–1.2)
LYMPHOCYTES # BLD AUTO: 0.8 K/UL (ref 1–4.8)
LYMPHOCYTES # BLD AUTO: 0.9 K/UL (ref 1–4.8)
LYMPHOCYTES # BLD AUTO: 1.1 K/UL (ref 1–4.8)
LYMPHOCYTES NFR BLD: 10.4 % (ref 18–48)
LYMPHOCYTES NFR BLD: 11.5 % (ref 18–48)
LYMPHOCYTES NFR BLD: 9 % (ref 18–48)
MAGNESIUM SERPL-MCNC: 1.9 MG/DL (ref 1.6–2.6)
MCH RBC QN AUTO: 38 PG (ref 27–31)
MCH RBC QN AUTO: 38.3 PG (ref 27–31)
MCH RBC QN AUTO: 39.8 PG (ref 27–31)
MCHC RBC AUTO-ENTMCNC: 33.3 G/DL (ref 32–36)
MCHC RBC AUTO-ENTMCNC: 34.1 G/DL (ref 32–36)
MCHC RBC AUTO-ENTMCNC: 35.1 G/DL (ref 32–36)
MCV RBC AUTO: 112 FL (ref 82–98)
MCV RBC AUTO: 113 FL (ref 82–98)
MCV RBC AUTO: 115 FL (ref 82–98)
MITOCHONDRIA AB TITR SER IF: NORMAL {TITER}
MONOCYTES # BLD AUTO: 1.6 K/UL (ref 0.3–1)
MONOCYTES # BLD AUTO: 1.8 K/UL (ref 0.3–1)
MONOCYTES # BLD AUTO: 1.9 K/UL (ref 0.3–1)
MONOCYTES NFR BLD: 18.8 % (ref 4–15)
MONOCYTES NFR BLD: 20.2 % (ref 4–15)
MONOCYTES NFR BLD: 20.3 % (ref 4–15)
NEUTROPHILS # BLD AUTO: 5.8 K/UL (ref 1.8–7.7)
NEUTROPHILS # BLD AUTO: 5.9 K/UL (ref 1.8–7.7)
NEUTROPHILS # BLD AUTO: 6 K/UL (ref 1.8–7.7)
NEUTROPHILS NFR BLD: 65.3 % (ref 38–73)
NEUTROPHILS NFR BLD: 67 % (ref 38–73)
NEUTROPHILS NFR BLD: 67.3 % (ref 38–73)
NRBC BLD-RTO: 0 /100 WBC
PATIENT COLLECTION POSITION: NORMAL PG/ML
PHOSPHATE SERPL-MCNC: 2.3 MG/DL (ref 2.7–4.5)
PLATELET # BLD AUTO: 43 K/UL (ref 150–450)
PLATELET # BLD AUTO: 44 K/UL (ref 150–450)
PLATELET # BLD AUTO: 54 K/UL (ref 150–450)
PMV BLD AUTO: 10.4 FL (ref 9.2–12.9)
PMV BLD AUTO: 10.5 FL (ref 9.2–12.9)
PMV BLD AUTO: 10.7 FL (ref 9.2–12.9)
POTASSIUM SERPL-SCNC: 3.6 MMOL/L (ref 3.5–5.1)
POTASSIUM SERPL-SCNC: 3.7 MMOL/L (ref 3.5–5.1)
POTASSIUM SERPL-SCNC: 4 MMOL/L (ref 3.5–5.1)
PROT SERPL-MCNC: 4.9 G/DL (ref 6–8.4)
PROT SERPL-MCNC: 5 G/DL (ref 6–8.4)
PROT SERPL-MCNC: 5.1 G/DL (ref 6–8.4)
PROTHROMBIN TIME: 19.8 SEC (ref 9–12.5)
RBC # BLD AUTO: 1.92 M/UL (ref 4–5.4)
RBC # BLD AUTO: 2.01 M/UL (ref 4–5.4)
RBC # BLD AUTO: 2.06 M/UL (ref 4–5.4)
RENIN PLAS-CCNC: 202.8 PG/ML
RSV AG BY MOLECULAR METHOD: NOT DETECTED
SARS-COV-2 RNA RESP QL NAA+PROBE: NOT DETECTED
SODIUM SERPL-SCNC: 132 MMOL/L (ref 136–145)
SODIUM SERPL-SCNC: 133 MMOL/L (ref 136–145)
SODIUM SERPL-SCNC: 134 MMOL/L (ref 136–145)
SODIUM UR-SCNC: 10 MMOL/L (ref 20–250)
WBC # BLD AUTO: 8.64 K/UL (ref 3.9–12.7)
WBC # BLD AUTO: 8.68 K/UL (ref 3.9–12.7)
WBC # BLD AUTO: 9.23 K/UL (ref 3.9–12.7)

## 2024-04-23 PROCEDURE — 94799 UNLISTED PULMONARY SVC/PX: CPT

## 2024-04-23 PROCEDURE — 82570 ASSAY OF URINE CREATININE: CPT

## 2024-04-23 PROCEDURE — 80053 COMPREHEN METABOLIC PANEL: CPT | Mod: 91

## 2024-04-23 PROCEDURE — 36415 COLL VENOUS BLD VENIPUNCTURE: CPT | Performed by: STUDENT IN AN ORGANIZED HEALTH CARE EDUCATION/TRAINING PROGRAM

## 2024-04-23 PROCEDURE — 94761 N-INVAS EAR/PLS OXIMETRY MLT: CPT

## 2024-04-23 PROCEDURE — 36415 COLL VENOUS BLD VENIPUNCTURE: CPT | Mod: XB

## 2024-04-23 PROCEDURE — 63600175 PHARM REV CODE 636 W HCPCS: Performed by: STUDENT IN AN ORGANIZED HEALTH CARE EDUCATION/TRAINING PROGRAM

## 2024-04-23 PROCEDURE — 63600175 PHARM REV CODE 636 W HCPCS

## 2024-04-23 PROCEDURE — 25000003 PHARM REV CODE 250

## 2024-04-23 PROCEDURE — 83880 ASSAY OF NATRIURETIC PEPTIDE: CPT | Performed by: STUDENT IN AN ORGANIZED HEALTH CARE EDUCATION/TRAINING PROGRAM

## 2024-04-23 PROCEDURE — 85025 COMPLETE CBC W/AUTO DIFF WBC: CPT | Mod: 91 | Performed by: STUDENT IN AN ORGANIZED HEALTH CARE EDUCATION/TRAINING PROGRAM

## 2024-04-23 PROCEDURE — 82239 BILE ACIDS TOTAL: CPT | Performed by: INTERNAL MEDICINE

## 2024-04-23 PROCEDURE — 99233 SBSQ HOSP IP/OBS HIGH 50: CPT | Mod: ,,, | Performed by: INTERNAL MEDICINE

## 2024-04-23 PROCEDURE — 27100171 HC OXYGEN HIGH FLOW UP TO 24 HOURS

## 2024-04-23 PROCEDURE — 83735 ASSAY OF MAGNESIUM: CPT | Performed by: STUDENT IN AN ORGANIZED HEALTH CARE EDUCATION/TRAINING PROGRAM

## 2024-04-23 PROCEDURE — 20600001 HC STEP DOWN PRIVATE ROOM

## 2024-04-23 PROCEDURE — C9113 INJ PANTOPRAZOLE SODIUM, VIA: HCPCS

## 2024-04-23 PROCEDURE — 84244 ASSAY OF RENIN: CPT | Performed by: INTERNAL MEDICINE

## 2024-04-23 PROCEDURE — 25000003 PHARM REV CODE 250: Performed by: STUDENT IN AN ORGANIZED HEALTH CARE EDUCATION/TRAINING PROGRAM

## 2024-04-23 PROCEDURE — 99900035 HC TECH TIME PER 15 MIN (STAT)

## 2024-04-23 PROCEDURE — 85610 PROTHROMBIN TIME: CPT | Performed by: STUDENT IN AN ORGANIZED HEALTH CARE EDUCATION/TRAINING PROGRAM

## 2024-04-23 PROCEDURE — S4991 NICOTINE PATCH NONLEGEND: HCPCS

## 2024-04-23 PROCEDURE — 84300 ASSAY OF URINE SODIUM: CPT

## 2024-04-23 PROCEDURE — 84100 ASSAY OF PHOSPHORUS: CPT | Performed by: STUDENT IN AN ORGANIZED HEALTH CARE EDUCATION/TRAINING PROGRAM

## 2024-04-23 RX ORDER — IBUPROFEN 200 MG
1 TABLET ORAL DAILY
Status: DISCONTINUED | OUTPATIENT
Start: 2024-04-23 | End: 2024-05-02 | Stop reason: HOSPADM

## 2024-04-23 RX ORDER — POTASSIUM CHLORIDE 20 MEQ/1
40 TABLET, EXTENDED RELEASE ORAL ONCE
Status: COMPLETED | OUTPATIENT
Start: 2024-04-23 | End: 2024-04-23

## 2024-04-23 RX ORDER — FUROSEMIDE 10 MG/ML
40 INJECTION INTRAMUSCULAR; INTRAVENOUS ONCE
Status: COMPLETED | OUTPATIENT
Start: 2024-04-23 | End: 2024-04-23

## 2024-04-23 RX ORDER — FUROSEMIDE 10 MG/ML
20 INJECTION INTRAMUSCULAR; INTRAVENOUS ONCE
Status: DISCONTINUED | OUTPATIENT
Start: 2024-04-23 | End: 2024-04-23

## 2024-04-23 RX ORDER — TALC
6 POWDER (GRAM) TOPICAL ONCE
Status: COMPLETED | OUTPATIENT
Start: 2024-04-23 | End: 2024-04-24

## 2024-04-23 RX ADMIN — LACTULOSE 15 G: 20 SOLUTION ORAL at 10:04

## 2024-04-23 RX ADMIN — THIAMINE HCL TAB 100 MG 100 MG: 100 TAB at 10:04

## 2024-04-23 RX ADMIN — POTASSIUM CHLORIDE 40 MEQ: 1500 TABLET, EXTENDED RELEASE ORAL at 06:04

## 2024-04-23 RX ADMIN — OCTREOTIDE ACETATE 50 MCG/HR: 500 INJECTION, SOLUTION INTRAVENOUS; SUBCUTANEOUS at 07:04

## 2024-04-23 RX ADMIN — PANTOPRAZOLE SODIUM 40 MG: 40 INJECTION, POWDER, FOR SOLUTION INTRAVENOUS at 08:04

## 2024-04-23 RX ADMIN — FUROSEMIDE 40 MG: 10 INJECTION, SOLUTION INTRAVENOUS at 10:04

## 2024-04-23 RX ADMIN — POTASSIUM & SODIUM PHOSPHATES POWDER PACK 280-160-250 MG 2 PACKET: 280-160-250 PACK at 04:04

## 2024-04-23 RX ADMIN — MIDODRINE HYDROCHLORIDE 10 MG: 5 TABLET ORAL at 08:04

## 2024-04-23 RX ADMIN — PANTOPRAZOLE SODIUM 40 MG: 40 INJECTION, POWDER, FOR SOLUTION INTRAVENOUS at 10:04

## 2024-04-23 RX ADMIN — OCTREOTIDE ACETATE 50 MCG/HR: 500 INJECTION, SOLUTION INTRAVENOUS; SUBCUTANEOUS at 06:04

## 2024-04-23 RX ADMIN — MIDODRINE HYDROCHLORIDE 10 MG: 5 TABLET ORAL at 10:04

## 2024-04-23 RX ADMIN — POTASSIUM & SODIUM PHOSPHATES POWDER PACK 280-160-250 MG 2 PACKET: 280-160-250 PACK at 06:04

## 2024-04-23 RX ADMIN — CEFTRIAXONE 2 G: 2 INJECTION, POWDER, FOR SOLUTION INTRAMUSCULAR; INTRAVENOUS at 01:04

## 2024-04-23 RX ADMIN — THERA TABS 1 TABLET: TAB at 10:04

## 2024-04-23 RX ADMIN — FOLIC ACID 1 MG: 1 TABLET ORAL at 10:04

## 2024-04-23 RX ADMIN — MUPIROCIN: 20 OINTMENT TOPICAL at 08:04

## 2024-04-23 RX ADMIN — NICOTINE 1 PATCH: 14 PATCH, EXTENDED RELEASE TRANSDERMAL at 10:04

## 2024-04-23 RX ADMIN — LACTULOSE 15 G: 20 SOLUTION ORAL at 04:04

## 2024-04-23 RX ADMIN — MIDODRINE HYDROCHLORIDE 10 MG: 5 TABLET ORAL at 04:04

## 2024-04-23 RX ADMIN — POTASSIUM & SODIUM PHOSPHATES POWDER PACK 280-160-250 MG 2 PACKET: 280-160-250 PACK at 10:04

## 2024-04-23 RX ADMIN — MUPIROCIN: 20 OINTMENT TOPICAL at 10:04

## 2024-04-23 NOTE — ASSESSMENT & PLAN NOTE
Pt with hx of alcohol use disorder presenting to [facility] [place] with acute on chronic liver failure. Reports last drink was on 04/19/24. Also has a hx of alcohol withdrawal with no seizure activity.     Alcohol Withdrawal precautions:  - Vitals q4h while awake  - CIWA monitoring  - Lorazepam PRN if 2 criteria are met: Systolic BP>160, Diastolic BP >110 or Pulse >110  - Start Vitamin supplementation- Thiamine, Folic acid, Vit. B12, and Multivitamin qd

## 2024-04-23 NOTE — TREATMENT PLAN
GI Treatment Plan    Carola Tovar is a 38 y.o. female admitted to hospital 4/20/2024 (Hospital Day: 4) due to Decompensated hepatic cirrhosis.     Interval History  Rapidly increasing oxygen requirements overnight 2/2 volume overload from IV albumin.     Objective  Temp:  [97.6 °F (36.4 °C)-100.3 °F (37.9 °C)] 100.3 °F (37.9 °C) (04/23 1154)  Pulse:  [] 95 (04/23 1154)  BP: ()/(48-71) 108/65 (04/23 1154)  Resp:  [17-32] 20 (04/23 1154)  SpO2:  [88 %-99 %] 97 % (04/23 1154)    Laboratory    Recent Labs   Lab 04/22/24  1814 04/22/24  2250 04/23/24  0747   HGB 8.5* 8.1* 7.3*       Lab Results   Component Value Date    WBC 9.23 04/23/2024    HGB 7.3 (L) 04/23/2024    HCT 21.4 (L) 04/23/2024     (H) 04/23/2024    PLT 54 (L) 04/23/2024       Lab Results   Component Value Date     (L) 04/23/2024    K 4.0 04/23/2024    CL 95 04/23/2024    CO2 26 04/23/2024    BUN 15 04/23/2024    CREATININE 1.0 04/23/2024    CALCIUM 7.9 (L) 04/23/2024    ANIONGAP 11 04/23/2024       Lab Results   Component Value Date    ALT 38 04/23/2024     (H) 04/23/2024    ALKPHOS 147 (H) 04/23/2024    BILITOT 17.7 (H) 04/23/2024       Lab Results   Component Value Date    INR 1.9 (H) 04/23/2024    INR 1.9 (H) 04/22/2024    INR 2.3 (H) 04/21/2024     MELD 3.0: 29 at 4/23/2024  5:05 AM  MELD-Na: 27 at 4/23/2024  5:05 AM  Calculated from:  Serum Creatinine: 1.0 mg/dL at 4/23/2024  5:05 AM  Serum Sodium: 132 mmol/L at 4/23/2024  5:05 AM  Total Bilirubin: 17.7 mg/dL at 4/23/2024  5:05 AM  Serum Albumin: 2.3 g/dL at 4/23/2024  5:05 AM  INR(ratio): 1.9 at 4/23/2024  5:05 AM  Age at listing (hypothetical): 38 years  Sex: Female at 4/23/2024  5:05 AM    Assessment/Plan:   Decompensated hepatic cirrhosis  Carola Tovar is a 38 y.o. female with history of alcohol use disorder who presents with alcoholic hepatitis superimposed on decompensated cirrhosis. Relapsed last year after 4 year period of sobriety. Did drink despite knowledge of  cirrhosis discovered last year after relapse. Concerned for history of relapse and ongoing drinking despite knowledge of liver disease. GI consulted for melena. Given hyponatremia and worsening renal function, Nephrology consulted. They recommended continuing IV albumin, with improvement in renal function but now with rapidly increasing oxygen requirements as of 4/22, in which a rapid response was called. CXR with evidence of volume overload. IV diuretics initiated. GI holding off on EGD until oxygen requirements improve.      Problem List:  Decompensated alcoholic cirrhosis  Alcoholic hepatitis  Melena  Hyponatremia-improving  PB-improving  Acute hypoxemic respiratory failure      Recommendations:  -She is currently not a candidate for liver transplant evaluation as she has continued to consume alcohol despite knowledge of her liver diease. Additionally, she does not have insurance which is a significant barrier. Currently, her oxygen requirements are significant and would also be another barrier for transplant.   -Hyponatremia improving. Not need to continue restricting to 1 L.   -Diuresis management per primary team.  -Nephrology following for PB.   -Follow up PETH.   -Addiction psych consult.  -Timing of EGD per GI.   -Lactulose titrated to 2-3 BMs per day  -Daily CBC, CMP, INR.  -Plan of care was discussed with primary team.  -We are signing-off. Please call with any questions.    Thank you for involving us in the care of Carola Tovar. Please call with any additional questions, concerns or changes in the patient's clinical status.    Tamara Perry MD  Gastroenterology Fellow

## 2024-04-23 NOTE — ASSESSMENT & PLAN NOTE
[place] [facility] [place] [facility] [place]. Has a history of alcohol use disorder. Physical exam remarkable for right CVA tenderness, jaundice, sclera icterus. Reports having melena on her stool. Hgb 6.9 and plts 42.     CTA negative for any intraabdominal bleeding.  Cirrhotic configuration liver with stigmata of portal venous hypertension and moderate volume abdominopelvic ascites. Relatively diffuse colonic wall thickening with submucosal edema could relate to portal colopathy.    Plan  - GI consulted, appreciate recs  - Trend Hgb q6hrs. Transfuse for Hgb <7, unless otherwise indicated  - Maintain IV access with 2 large bore Ivs  - Intravascular resuscitation/support with IVFs   - Hold all NSAIDs and anticoagulants, unless contraindicated  - Bolus IV pantoprazole 80mg followed by 40mg BID  - Continue Octreotide and CTX  - Please correct any coagulopathy with platelets and FFP for goal of platelets >50K and INR <2.0  - Please notify GI team if there is significant change in patient's clinical status

## 2024-04-23 NOTE — CODE/ RAPID DOCUMENTATION
"Medical Emergency Team Follow Up Note   Critical Care Medicine    CC: Decompensated hepatic cirrhosis  Date: 04/23/2024  Admit Date: 4/20/2024  Hospital Length of Stay: 3  MRN: [id number]  The patient location is: Bed 8098/8098 A  Dx: Decompensated hepatic cirrhosis  Code Status: Full Code   Chart Reviewed: 04/23/2024, 11:13 AM      Medical Emergency Team Follow Up Note Day 1    SUBJECTIVE:     HPI:  Carola Tovar has no past medical history on file.    Significant Events: Follow up  for evaluation of respiratory distress        OBJECTIVE:     Physical Exam  Constitutional:       Appearance: She is ill-appearing.   Eyes:      General: Scleral icterus present.   Cardiovascular:      Rate and Rhythm: Normal rate.      Pulses: Normal pulses.   Pulmonary:      Effort: No respiratory distress.      Comments: On 10 L HFNC  Skin:     Coloration: Skin is jaundiced.   Neurological:      Mental Status: She is alert.         Last VS: BP (!) 113/58 (BP Location: Left arm, Patient Position: Lying)   Pulse 81   Temp 97.6 °F (36.4 °C) (Oral)   Resp 20   Ht 5' 6" (1.676 m)   Wt 71.7 kg (158 lb)   SpO2 98%   Breastfeeding No   BMI 25.50 kg/m²     24H Vital Sign Range:    Temp:  [97.6 °F (36.4 °C)-100.3 °F (37.9 °C)]   Pulse:  []   Resp:  [17-20]   BP: ()/(48-71)   SpO2:  [88 %-99 %]     Level of Consciousness (AVPU): alert      Intake/Output Summary (Last 24 hours) at 4/23/2024 1113  Last data filed at 4/23/2024 0439  Gross per 24 hour   Intake 38.05 ml   Output 1250 ml   Net -1211.95 ml       Recent Labs     04/22/24  1814 04/22/24  2004 04/22/24  2250 04/23/24  0747   WBC 8.80  --  9.26 9.23   HGB 8.5*  --  8.1* 7.3*   HCT 23.4* 25* 23.2* 21.4*   PLT 55*  --  57* 54*       Recent Labs     04/21/24  1447 04/21/24  1748 04/22/24  0730 04/22/24  1213 04/22/24  1814 04/22/24  2250 04/23/24  0505   *   < > 123*   < > 125* 129* 132*   K 3.3*   < > 3.0*   < > 3.3* 3.5 4.0   CL 82*   < > 85*   < > 87* 92* 95   CO2 27 "   < > 28   < > 26 27 26   BUN 15   < > 19   < > 17 16 15   CREATININE 1.2   < > 1.5*   < > 1.3 1.2 1.0   *   < > 100   < > 180* 116* 78   PHOS 2.3*  --  2.2*  --   --   --  2.3*   MG 2.2  --  2.1  --   --   --  1.9    < > = values in this interval not displayed.        Recent Labs     04/22/24 2004   PH 7.529*   PCO2 34.2*   PO2 59*   HCO3 28.5*   POCSATURATED 93   BE 6*        Lab Results   Component Value Date    LACTATE 1.9 04/21/2024    LACTATE 2.6 (H) 04/21/2024    LACTATE 8.4 (HH) 04/20/2024         ASSESSMENT AND PLAN :       Acute Hypoxemic Respiratory Failure  Patient with Hypoxic Respiratory failure which is Acute.  she is not on home oxygen. Supplemental oxygen was provided and noted- Oxygen Concentration (%):  [] 60     Signs/symptoms of respiratory failure include- tachypnea and increased work of breathing. Contributing diagnoses includes -  fluid volume excess  Labs and images were reviewed. Patient Has recent ABG, which has been reviewed. Will treat underlying causes and adjust management of respiratory failure as follows-    --Oxygen requirements improved after lasix x2, weaned to 10 L bubble flow  --Goal SpO2 >90%  --Fluid restrict, continued diuresis      I spent a total of 35 minutes on the day of the visit.This includes face to face time and non-face to face time preparing to see the patient (eg, review of tests), obtaining and/or reviewing separately obtained history, documenting clinical information in the electronic or other health record, independently interpreting results and communicating results to the patient/family/caregiver, or care coordinator.     Smiley SANTIAGO-BARRINGTONP  Pulmonary Critical Care Medicine  04/23/2024  11:25 AM

## 2024-04-23 NOTE — NURSING
Pt O2 stat were in low 80's with 3L O2 NC,,RN placed O2 to 5L and pt O2 malia to high 80's, pt was repositioned and O2 stilled remained unchanged,non re breather was placed.@  15L !00%  Pt began to stat in 90's and Rapid and MD  were called.

## 2024-04-23 NOTE — PROGRESS NOTES
[facility] - Telemetry [facility] Medicine  Progress Note    Patient Name: Carola Tovar  MRN: [id number]  Patient Class: IP- Inpatient   Admission Date: 4/20/2024  Length of Stay: 3 days  Attending Physician: Theresa Alvarez MD  Primary Care Provider: Roro, Primary Doctor        Subjective:     Principal Problem:Decompensated hepatic cirrhosis        HPI:  Ms. Tovar is a 37 yo F with alcohol abuse disorder who presented to OSH ED with jaundice. Pt is originally from [place], but have spent the last 1.5 months in [place] visiting a friend. For the past month she has started noticing yellow discoloration in her eyes and skin. Pt reports that she used to drink 48 oz of vodka daily, but for the past week she has taper down to only 16 oz per day. Last drink was yesterday 04/19. She also reports multiple episodes of withdrawal in the past, but no seizure episodes. She also report dysuria and increased urinary frequency. Denies headaches, visual changes, SOB, cough, chest pain, palpitation, nausea, vomiting, abdominal pain, diarrhea, constipation.     At the OSH ED the patient was afebrile tachycardic to 120 with /42. Lab work remarkable for Hb/Hct 7.4/21.0, INR 2.57, Na 120, K 2.5, Mg 0.7. Tbili of 24. U/A nitrite positive. Given IV CTX, Mg and K replacements. Hep studies negative. CT A/P shows cirrhosis and portal HTN, gastric diffuse bowel wall thickening and jose-pancreatic fat stranding. Pt was transferred to [facility] for higher level of care. Pt admitted to hospital medicine for further management of acute on chronic liver failure and hepatology was consulted.     Overview/Hospital Course:  Pt admitted to hospital medicine for acute on chronic liver failure. GI and Hepatology consulted. Hgb 6.9 pt received 2 units of PRBC. Plts low and elevated INR and PT, cryo, plts and vit k given. Low calcium, calcium gluconate given. US liver showing ascites, Reversal of flow of the main portal, right portal, and left  portal veins, which can be seen with liver disease. Hepatomegaly with heterogeneous echotexture. CTA negative for any intraabdominal bleeding.  Cirrhotic configuration liver with stigmata of portal venous hypertension and moderate volume abdominopelvic ascites. Relatively diffuse colonic wall thickening with submucosal edema could relate to portal colopathy. Sodium trending down to 118, pt AOX4. Critical care consulted, pt stable to continue under hospital medicine services. Initiated on octreotide, midodrine, and albumin. Hepatology consulted for transplant evaluation, deemed not a candidate. Treated with prn ativan for alcohol withdraws. Developed worsening PB concerning for HRS, nephrology consulted. Patient did not tolerate albumin challenge which makes HRS less likely. Patient increased oxygen requirements night to 30L HFNC from 2L NC. MICU recommended continued diuresis. Oxygen requirements improved with spot diuresis Lasix 40 x2. Hepatology did not think she was a candidate for liver transplant due to her active alcohol use despite knowing of her liver disease. Her EGD was delayed on 4/23 due to worsening oxygen requirements overnight.       Interval History: EGD delayed due to hypoxia. She is not a liver transplant candidate. With a MELD of 31, it would be reasonable to introduce palliative care as an alternative.          Review of Systems  Objective:     Vital Signs (Most Recent):  Temp: 100.3 °F (37.9 °C) (04/23/24 1154)  Pulse: 95 (04/23/24 1154)  Resp: 20 (04/23/24 1154)  BP: 108/65 (04/23/24 1154)  SpO2: 97 % (04/23/24 1154) Vital Signs (24h Range):  Temp:  [97.6 °F (36.4 °C)-100.3 °F (37.9 °C)] 100.3 °F (37.9 °C)  Pulse:  [] 95  Resp:  [17-32] 20  SpO2:  [88 %-99 %] 97 %  BP: ()/(48-71) 108/65     Weight: 71.7 kg (158 lb)  Body mass index is 25.5 kg/m².    Intake/Output Summary (Last 24 hours) at 4/23/2024 1303  Last data filed at 4/23/2024 0439  Gross per 24 hour   Intake 38.05 ml    Output 1250 ml   Net -1211.95 ml         Physical Exam  Constitutional:       Appearance: She is ill-appearing.   HENT:      Head: Normocephalic and atraumatic.   Eyes:      General: Scleral icterus present.   Cardiovascular:      Rate and Rhythm: Normal rate and regular rhythm.   Pulmonary:      Effort: No respiratory distress.      Breath sounds: No wheezing.      Comments: On HFNC  Abdominal:      General: There is distension.      Palpations: Abdomen is soft.      Comments: Asterixis of R hand   Skin:     Coloration: Skin is jaundiced.   Neurological:      Mental Status: She is alert and oriented to person, place, and time.      Comments: Lethargic, delayed responses           Significant Labs: All pertinent labs within the past 24 hours have been reviewed.    Significant Imaging: I have reviewed all pertinent imaging results/findings within the past 24 hours.     Assessment/Plan:      * Decompensated hepatic cirrhosis  Pt with a hx of alcohol use disorder presenting from OSH for complains of jaundice. She drinks 48 oz of vodka daily, however has been trying to taper down. Has a history of alcohol withdrawal in the past, but no seizures. As reported per transfer note lab work at OSH was remarkable for  Hb/Hct 7.4/21.0, INR 2.57, Na 120, K 2.5, Mg 0.7. Tbili of 24. . Hep studies negative. CT A/P shows cirrhosis and portal HTN, gastric diffuse bowel wall thickening and jose-pancreatic fat stranding. Physical exam remarkable for sclera icterus, jaundice, asterixis and abdominal distention. US abdomen did not showed a viable pocket for paracentesis.   - US liver showing ascites, Reversal of flow of the main portal, right portal, and left portal veins, which can be seen with liver disease. Hepatomegaly with heterogeneous echotexture.   - CTA negative for any intraabdominal bleeding.  Cirrhotic configuration liver with stigmata of portal venous hypertension and moderate volume abdominopelvic ascites. Relatively  diffuse colonic wall thickening with submucosal edema could relate to portal colopathy.  - Hepatology consulted. Not a candidate for liver transplant at this time given active alcohol use    Plan  - Continue CTX for SBP PPX  - Continue PPI until EGD  - Continue Octreotide   - F/u PeTH, anti sm muscle, anti mitochondrial abs   - Continue lactulose for goal 3-4 BMs per day   - No pocket for paracentesis   - Addiction psych consulted        UGIB (upper gastrointestinal bleed)  Pt presenting to [facility] for acute on chronic liver failure. Has a history of alcohol use disorder. Physical exam remarkable for right CVA tenderness, jaundice, sclera icterus. Reports having melena on her stool. Hgb 6.9 and plts 42.     CTA negative for any intraabdominal bleeding.  Cirrhotic configuration liver with stigmata of portal venous hypertension and moderate volume abdominopelvic ascites. Relatively diffuse colonic wall thickening with submucosal edema could relate to portal colopathy.    Plan  - GI consulted, appreciate recs  - Trend Hgb q6hrs. Transfuse for Hgb <7, unless otherwise indicated  - Maintain IV access with 2 large bore Ivs  - Intravascular resuscitation/support with IVFs   - Hold all NSAIDs and anticoagulants, unless contraindicated  - Bolus IV pantoprazole 80mg followed by 40mg BID  - Continue Octreotide and CTX  - Please correct any coagulopathy with platelets and FFP for goal of platelets >50K and INR <2.0  - Please notify GI team if there is significant change in patient's clinical status      Acute cystitis  Pt presenting from OSH for concerns of liver failure. Pt reports dysuria and urinary frequency. UA at OSH remarkable for nitrite positive. Given IV CTX.    Plan  - UA and urine culture  - continue rocephin       Acute blood loss anemia  Patient's anemia is currently uncontrolled. Has not received any PRBCs to date. Etiology likely d/t chronic disease due to Chronic liver disease  Current CBC reviewed-   Lab Results    Component Value Date    HGB 7.6 (L) 04/22/2024    HCT 20.9 (L) 04/22/2024     - s/p 2 PRBC  - Type and screen   - Transfusing one unit of blood  - Monitor serial CBC and transfuse if patient becomes hemodynamically unstable, symptomatic or H/H drops below 7/21.      Alcoholic cirrhosis  Patient with known Cirrhosis with Child's class C. Co-morbidities are present and inclusive of portal hypertension and anemia/pancytopenia.  MELD-Na score calculated; MELD 3.0: 31 at 4/23/2024 11:31 AM  MELD-Na: 28 at 4/23/2024 11:31 AM  Calculated from:  Serum Creatinine: 1.2 mg/dL at 4/23/2024 11:31 AM  Serum Sodium: 133 mmol/L at 4/23/2024 11:31 AM  Total Bilirubin: 18.0 mg/dL at 4/23/2024 11:31 AM  Serum Albumin: 2.3 g/dL at 4/23/2024 11:31 AM  INR(ratio): 1.9 at 4/23/2024  5:05 AM  Age at listing (hypothetical): 38 years  Sex: Female at 4/23/2024 11:31 AM      Continue chronic meds. Etiology likely ETOH. Will avoid any hepatotoxic meds, and monitor CBC/CMP/INR for synthetic function.     For plan please see liver failure    Coagulopathy  Pt with hx of alcohol use disorder and recently diagnosed alcohol liver cirrhosis. Lab work remarkable for elevated INR and Plts 42, PT 25.8 INR 2.5 Fibrinogen 105.  - S/p 2 units prbc, 1 unit plts, and 1 unit cryo on admission    Plan  - Plts goal > 50K  - CBC q6 hrs  - Daily PT/INR    Hyponatremia  Patient has hyponatremia which is uncontrolled,We will aim to correct the sodium by 4-6mEq in 24 hours. We will monitor sodium Daily. The hyponatremia is due to Cirrhosis. We will obtain the following studies: Urine sodium, urine osmolality, serum osmolality. We will treat the hyponatremia with Fluid restriction of:  1.5 liter per day. The patient's sodium results have been reviewed and are listed below.  Recent Labs   Lab 04/22/24  1213   *     Improving with fluid restriction and low sodium diet.   Midodrine, octreotide, albumin infusion   - F/u nephrology recommendations    Alcohol use  disorder  Pt with hx of alcohol use disorder presenting [place] [facility] with acute on chronic liver failure. Reports last drink was on 04/19/24. Also has a hx of alcohol withdrawal with no seizure activity.     Alcohol Withdrawal precautions:  - Vitals q4h while awake  - CIWA monitoring  - Lorazepam PRN if 2 criteria are met: Systolic BP>160, Diastolic BP >110 or Pulse >110  - Start Vitamin supplementation- Thiamine, Folic acid, Vit. B12, and Multivitamin qd        VTE Risk Mitigation (From admission, onward)           Ordered     IP VTE LOW RISK PATIENT  Once         04/20/24 1347     Place sequential compression device  Until discontinued         04/20/24 1347                    Discharge Planning   KARSON: 4/30/2024     Code Status: Full Code   Is the patient medically ready for discharge?:     Reason for patient still in hospital (select all that apply): Patient trending condition  Discharge Plan A: Home with family                  Sakina Delong DO  Department of Hospital Medicine   [facility] - Telemetry Stepdown

## 2024-04-23 NOTE — CARE UPDATE
RAPID RESPONSE NURSE PROACTIVE ROUNDING NOTE       Time of Visit:     Admit Date: 2024  LOS: 2  Code Status: Full Code   Date of Visit: 2024  : 1985  Age: 38 y.o.  Sex: female  Race: Unknown  Bed: 8098/8098 A:   MRN: [id number]  Was the patient discharged from an ICU this admission? No   Was the patient discharged from a PACU within last 24 hours? No   Did the patient receive conscious sedation/general anesthesia in last 24 hours? No  Was the patient in the ED within the past 24 hours? No  Was the patient on NIPPV within the past 24 hours? No   Attending Physician: Theresa Alvarez MD  Primary Service: [facility] MED 5   Time spent at the bedside: 45 - 60 min    SITUATION    Notified by bedside RN via phone call.  Reason for alert: Hypoxia  Called to evaluate the patient for Respiratory    BACKGROUND     Why is the patient in the hospital?: Decompensated hepatic cirrhosis    Patient has no past medical history on file.    Last Vitals:  Temp: 99.7 °F (37.6 °C) (2043)  Pulse: 98 (2043)  Resp: 17 (1909)  BP: 125/59 (2010)  SpO2: 95 % (2043)    24 Hours Vitals Range:  Temp:  [98.4 °F (36.9 °C)-99.8 °F (37.7 °C)]   Pulse:  []   Resp:  [17-19]   BP: ()/(43-71)   SpO2:  [83 %-98 %]     Labs:  Recent Labs     24  0730 24  1213 24  18124   WBC 10.23 8.90 8.80  --    HGB 7.9* 7.6* 8.5*  --    HCT 21.9* 20.9* 23.4* 25*   PLT 55* 44* 55*  --        Recent Labs     24  0443 24  1114 24  1447 24  1748 24  0730 24  1213 24   *   < > 121*   < > 123* 124* 125*   K 3.8   < > 3.3*   < > 3.0* 3.1* 3.3*   CL 79*   < > 82*   < > 85* 86* 87*   CO2 26   < > 27   < > 28 27 26   BUN 13   < > 15   < > 19 18 17   CREATININE 0.8   < > 1.2   < > 1.5* 1.5* 1.3   *   < > 127*   < > 100 141* 180*   PHOS 2.5*  --  2.3*  --  2.2*  --   --    MG 2.2  --  2.2  --  2.1  --   --     < > = values in  this interval not displayed.        Recent Labs     04/22/24 2004   PH 7.529*   PCO2 34.2*   PO2 59*   HCO3 28.5*   POCSATURATED 93   BE 6*        ASSESSMENT    On arrival, pt supine in bed. Pt with shallow breathing, RR 32. On 15 L 100% NRBM, O2 sat 99%. Lungs diminished to bilateral uppers and diffuse fine crackles to bilateral bases.  Pt states she has had some increased WOB throughout the day, but this episode came on fairly quickly.  ABG showing hypoxia with PO2 59. Chest xray completed, showing worsening pulmonary edema. Pt placed initiall on Airvo 20 L 60%, o2 sat only 92%, increased to 70%.  Primary team was called to bedside to eval pt and decision made to give lasix 40 IVP, monitor UOP over next few hours and re-dose with Lasix if UOP is not robust and if pt is still requiring Airvo. Airvo adjusted to 30L 60%. O2 sat 96%.   Physical Exam  Constitutional:       Appearance: She is toxic-appearing.   Eyes:      Comments: Scleral juandice bilateral   Pulmonary:      Effort: Tachypnea and accessory muscle usage present.      Breath sounds: Examination of the right-upper field reveals decreased breath sounds. Examination of the left-upper field reveals decreased breath sounds. Examination of the right-middle field reveals rales. Examination of the right-lower field reveals rales. Examination of the left-lower field reveals rales. Decreased breath sounds and rales present.   Abdominal:      General: There is distension.   Skin:     Coloration: Skin is jaundiced.   Neurological:      Mental Status: She is lethargic.      Motor: Weakness present.        Latest Reference Range & Units 04/22/24 20:04 04/22/24 20:05   POC PH 7.35 - 7.45  7.529 (H)    POC PCO2 35 - 45 mmHg 34.2 (L)    POC PO2 80 - 100 mmHg 59 (LL)    POC HCO3 24 - 28 mmol/L 28.5 (H)    Sodium, Blood Gas 136 - 145 mmol/L 127 (L)    Potassium, Blood Gas 3.5 - 5.1 mmol/L 3.3 (L)    POC SATURATED O2 95 - 100 % 93    Sample  ARTERIAL ARTERIAL   POC TCO2 23  - 27 mmol/L 30 (H)    POC Ionized Calcium 1.06 - 1.42 mmol/L 1.05 (L)    POC Hematocrit 36 - 54 %PCV 25 (L)    POC BE -2 to 2 mmol/L 6 (H)    Flow  5    DelSys  Nasal Can NRB   Site  RR RR   Mode  SPONT SPONT   POC Lactate 0.36 - 1.25 mmol/L  2.64 (H)       The patient was seen for Respiratory problem. Staff concerns included tachypnea, new onset of difficulty breathing, oxygen saturation < 90% despite supplemental oxygen, increased WOB, and increased oxygen requirements. The following interventions were performed: supplemental oxygen, POCT arterial blood gas , portable chest x-ray, continuous pulse ox monitoring, continuous cardiac monitoring continued, and Lasix 40 mg IVP.    RECOMMENDATIONS    Cxray, ABG, Lasix 40 IVP, AIRVO, Hold PM dose Albumin    PROVIDER ESCALATION    Yes/No  Yes    Orders received and case discussed with Dr. Vazquez .    Disposition: Remain in room 8098.    FOLLOW-UP    Bedside Dylan GUY. Digna Fuentes RN  updated on plan of care. Instructed to call the Rapid Response Nurse, Elma Osborn RN at [phone] for additional questions or concerns.

## 2024-04-23 NOTE — ASSESSMENT & PLAN NOTE
Pt with a hx of alcohol use disorder presenting from OSH for complains of jaundice. She drinks 48 oz of vodka daily, however has been trying to taper down. Has a history of alcohol withdrawal in the past, but no seizures. As reported per transfer note lab work at OSH was remarkable for  Hb/Hct 7.4/21.0, INR 2.57, Na 120, K 2.5, Mg 0.7. Tbili of 24. . Hep studies negative. CT A/P shows cirrhosis and portal HTN, gastric diffuse bowel wall thickening and jose-pancreatic fat stranding. Physical exam remarkable for sclera icterus, jaundice, asterixis and abdominal distention. US abdomen did not showed a viable pocket for paracentesis.   - US liver showing ascites, Reversal of flow of the main portal, right portal, and left portal veins, which can be seen with liver disease. Hepatomegaly with heterogeneous echotexture.   - CTA negative for any intraabdominal bleeding.  Cirrhotic configuration liver with stigmata of portal venous hypertension and moderate volume abdominopelvic ascites. Relatively diffuse colonic wall thickening with submucosal edema could relate to portal colopathy.  - Hepatology consulted. Not a candidate for liver transplant at this time given active alcohol use    Plan  - Continue CTX for SBP PPX  - Continue PPI until EGD  - Continue Octreotide   - F/u PeTH, anti sm muscle, anti mitochondrial abs   - Continue lactulose for goal 3-4 BMs per day   - No pocket for paracentesis   - Addiction psych consulted  - Not a candidate for liver transplant

## 2024-04-23 NOTE — SUBJECTIVE & OBJECTIVE
Interval History: EGD delayed due to hypoxia. She is not a liver transplant candidate. With a MELD of 31, it would be reasonable to introduce palliative care as an alternative.          Review of Systems  Objective:     Vital Signs (Most Recent):  Temp: 100.3 °F (37.9 °C) (04/23/24 1154)  Pulse: 95 (04/23/24 1154)  Resp: 20 (04/23/24 1154)  BP: 108/65 (04/23/24 1154)  SpO2: 97 % (04/23/24 1154) Vital Signs (24h Range):  Temp:  [97.6 °F (36.4 °C)-100.3 °F (37.9 °C)] 100.3 °F (37.9 °C)  Pulse:  [] 95  Resp:  [17-32] 20  SpO2:  [88 %-99 %] 97 %  BP: ()/(48-71) 108/65     Weight: 71.7 kg (158 lb)  Body mass index is 25.5 kg/m².    Intake/Output Summary (Last 24 hours) at 4/23/2024 1303  Last data filed at 4/23/2024 0439  Gross per 24 hour   Intake 38.05 ml   Output 1250 ml   Net -1211.95 ml         Physical Exam  Constitutional:       Appearance: She is ill-appearing.   HENT:      Head: Normocephalic and atraumatic.   Eyes:      General: Scleral icterus present.   Cardiovascular:      Rate and Rhythm: Normal rate and regular rhythm.   Pulmonary:      Effort: No respiratory distress.      Breath sounds: No wheezing.      Comments: On HFNC  Abdominal:      General: There is distension.      Palpations: Abdomen is soft.      Comments: Asterixis of R hand   Skin:     Coloration: Skin is jaundiced.   Neurological:      Mental Status: She is alert and oriented to person, place, and time.      Comments: Lethargic, delayed responses           Significant Labs: All pertinent labs within the past 24 hours have been reviewed.    Significant Imaging: I have reviewed all pertinent imaging results/findings within the past 24 hours.

## 2024-04-23 NOTE — PLAN OF CARE
Paged by Rapid Response at 8:20pm. Pt had become hypoxic and had increase O2 requirements (comfort flow 20L/65%). Stat CXR ordered with clear evidence of worsening pulmonary edema and pleural effusion. Suspect due to fluid resuscitation and albumin. Lasix 40 mg IV x1 ordered and instructed to hold PM albumin. Will defer to day team regarding further albumin dosing.     Bambi Vazquez MD

## 2024-04-23 NOTE — PLAN OF CARE
Problem: Adult Inpatient Plan of Care  Goal: Plan of Care Review  Outcome: Not Progressing  Goal: Patient-Specific Goal (Individualized)  Outcome: Not Progressing  Goal: Absence of Hospital-Acquired Illness or Injury  Outcome: Not Progressing  Goal: Optimal Comfort and Wellbeing  Outcome: Not Progressing  Goal: Readiness for Transition of Care  Outcome: Not Progressing     Problem: Fluid and Electrolyte Imbalance (Acute Kidney Injury/Impairment)  Goal: Fluid and Electrolyte Balance  Outcome: Not Progressing     Problem: Oral Intake Inadequate (Acute Kidney Injury/Impairment)  Goal: Optimal Nutrition Intake  Outcome: Not Progressing     Problem: Renal Function Impairment (Acute Kidney Injury/Impairment)  Goal: Effective Renal Function  Outcome: Not Progressing

## 2024-04-23 NOTE — SUBJECTIVE & OBJECTIVE
Interval History: Worsened oxygenation overnight. Albumin infusion discontinued, lasix 40 mg IV given. Patient's Cr improved this AM to 1.0. Urine microscopy with few granular casts indicating component of ATN despite improving Cr.    Review of patient's allergies indicates:   Allergen Reactions    Sulfa (sulfonamide antibiotics) Hives     Current Facility-Administered Medications   Medication Dose Route Frequency Provider Last Rate Last Admin    cefTRIAXone (ROCEPHIN) 2 g in sodium chloride 0.9 % 100 mL IVPB (MB+)  2 g Intravenous Q24H Theresa Alvarez MD   Stopped at 04/23/24 0215    dextrose 10% bolus 125 mL 125 mL  12.5 g Intravenous PRN Kobi Villarreal MD        dextrose 10% bolus 250 mL 250 mL  25 g Intravenous PRN Kobi Villarreal MD        folic acid tablet 1 mg  1 mg Oral Daily Kobi Villarreal MD   1 mg at 04/23/24 1050    glucagon (human recombinant) injection 1 mg  1 mg Intramuscular PRN Kobi Villarreal MD        glucose chewable tablet 16 g  16 g Oral PRN Kobi Villarreal MD        glucose chewable tablet 24 g  24 g Oral PRN Kobi Villarreal MD        lactulose 20 gram/30 mL solution Soln 15 g  15 g Oral TID Yessica Garcia, DO   15 g at 04/23/24 1049    LORazepam tablet 2 mg  2 mg Oral Q4H PRN Yessica Garcia,         melatonin tablet 6 mg  6 mg Oral Once Bambi Vazquez MD        midodrine tablet 10 mg  10 mg Oral TID Theresa Alvarez MD   10 mg at 04/23/24 1050    multivitamin tablet  1 tablet Oral Daily Kobi Villarreal MD   1 tablet at 04/23/24 1050    mupirocin 2 % ointment   Nasal BID Theresa Alvarez MD   Given at 04/23/24 1052    naloxone 0.4 mg/mL injection 0.02 mg  0.02 mg Intravenous PRN Kobi Villarreal MD        nicotine 14 mg/24 hr 1 patch  1 patch Transdermal Daily Sakina Delong DO   1 patch at 04/23/24 1051    octreotide (SANDOSTATIN) 500 mcg in sodium chloride  0.9% 100 mL infusion  50 mcg/hr Intravenous Continuous [place], [place], DO 10 mL/hr at 04/23/24 0656 50 mcg/hr at 04/23/24 0656    pantoprazole injection 40 mg  40 mg Intravenous BID Antonio Colindres DO   40 mg at 04/23/24 1051    potassium, sodium phosphates 280-160-250 mg packet 2 packet  2 packet Oral QID (AC & HS) Yessica Garcia DO   2 packet at 04/23/24 1050    sodium chloride 0.9% flush 10 mL  10 mL Intravenous Q12H PRN Kobi Villarreal MD        thiamine tablet 100 mg  100 mg Oral Daily Theresa Alvarez MD   100 mg at 04/23/24 1050       Objective:     Vital Signs (Most Recent):  Temp: 97.6 °F (36.4 °C) (04/23/24 1018)  Pulse: 81 (04/23/24 1018)  Resp: (!) 32 (04/23/24 1018)  BP: (!) 113/58 (04/23/24 1018)  SpO2: 98 % (04/23/24 1113) Vital Signs (24h Range):  Temp:  [97.6 °F (36.4 °C)-100.3 °F (37.9 °C)] 97.6 °F (36.4 °C)  Pulse:  [] 81  Resp:  [17-32] 32  SpO2:  [88 %-99 %] 98 %  BP: ()/(48-71) 113/58     Weight: 71.7 kg (158 lb) (04/22/24 0815)  Body mass index is 25.5 kg/m².  Body surface area is 1.83 meters squared.    I/O last 3 completed shifts:  In: 1587.2 [P.O.:250; I.V.:400.2; IV Piggyback:937]  Out: 1400 [Urine:1400]     Physical Exam  Vitals reviewed.   Constitutional:       General: She is not in acute distress.  HENT:      Head: Normocephalic and atraumatic.   Eyes:      General: Scleral icterus present.      Extraocular Movements: Extraocular movements intact.      Pupils: Pupils are equal, round, and reactive to light.   Cardiovascular:      Rate and Rhythm: Normal rate and regular rhythm.      Heart sounds: No murmur heard.  Pulmonary:      Effort: Pulmonary effort is normal. No respiratory distress.      Breath sounds: No wheezing or rales.      Comments: Breathing comfortably on non-rebreather  Abdominal:      General: Abdomen is flat. There is distension.      Palpations: Abdomen is soft.      Tenderness: There is no abdominal tenderness.   Musculoskeletal:          General: No swelling or tenderness. Normal range of motion.      Cervical back: Normal range of motion.      Right lower leg: No edema.      Left lower leg: No edema.   Skin:     General: Skin is warm and dry.      Coloration: Skin is jaundiced.      Findings: Bruising present.      Comments: Telangiectasias on chest   Neurological:      Mental Status: She is alert and oriented to person, place, and time. Mental status is at baseline.       Significant Labs:  CMP:   Recent Labs   Lab 04/23/24  0505   GLU 78   CALCIUM 7.9*   ALBUMIN 2.3*   PROT 5.0*   *   K 4.0   CO2 26   CL 95   BUN 15   CREATININE 1.0   ALKPHOS 147*   ALT 38   *   BILITOT 17.7*     All labs within the past 24 hours have been reviewed.     Significant Imaging:  X-Ray: Reviewed

## 2024-04-23 NOTE — PROGRESS NOTES
[facility] - Telemetry Stepdown  Nephrology  Progress Note    Patient Name: Carola Tovar  MRN: [id number]  Admission Date: 4/20/2024  Hospital Length of Stay: 3 days  Attending Provider: Theresa Alvarez MD   Primary Care Physician: Roro, Primary Doctor  Principal Problem:Decompensated hepatic cirrhosis    Subjective:     HPI: The patient is a 38 year old female with a history of alcohol use disorder who presented to an OSH in [place] with jaundice and scleral icterus. She consumed about 48 oz of vodka daily, decreased to 16 oz per day. Her last drink was 4/19/2024. At the OSH she was found to be in acute liver failure. [place] [facility] [place] She was admitted to hospital medicine. Her UA was concerning for 1+ protein, 3+ blood, 2+ bilirubin, > 100 RBC, 78 WBC. She was started on Vancomycin + ceftriaxone and found to have an elevated vanc trough on 4/22 to 30.3. Additionally she is being evaluated by GI given concern for upper GI bleed due to reported melena. CTA showing: Geographic areas of hypoattenuation involving the left greater than right kidneys on nonenhanced/precontrast series, nonspecific. Findings are nonspecific and of uncertain etiology, could reflect sequela of concentrated urine or metabolic derangement. Nephrology was consulted 4/22 due to up-trending creatinine and persistent hyponatremia. Concern for HRS vs alternative etiology of PB.    The patient reports recent NSAID use. She states that she had dysuria and urinary frequency prior to admission but clarifies that the volume of her urine also decreased during the days prior to admission. The urine became dark in color as well prior to admit.    Interval History: Worsened oxygenation overnight. Albumin infusion discontinued, lasix 40 mg IV given. Patient's Cr improved this AM to 1.0. Urine microscopy with few granular casts indicating component of ATN despite improving Cr.    Review of patient's allergies  indicates:   Allergen Reactions    Sulfa (sulfonamide antibiotics) Hives     Current Facility-Administered Medications   Medication Dose Route Frequency Provider Last Rate Last Admin    cefTRIAXone (ROCEPHIN) 2 g in sodium chloride 0.9 % 100 mL IVPB (MB+)  2 g Intravenous Q24H Theresa Alvarez MD   Stopped at 04/23/24 0215    dextrose 10% bolus 125 mL 125 mL  12.5 g Intravenous PRN Kobi Villarreal MD        dextrose 10% bolus 250 mL 250 mL  25 g Intravenous PRN Kobi Villarreal MD        folic acid tablet 1 mg  1 mg Oral Daily Kobi Villarreal MD   1 mg at 04/23/24 1050    glucagon (human recombinant) injection 1 mg  1 mg Intramuscular PRN Kobi Villarreal MD        glucose chewable tablet 16 g  16 g Oral PRN Kobi Villarreal MD        glucose chewable tablet 24 g  24 g Oral PRN Kobi Villarreal MD        lactulose 20 gram/30 mL solution Soln 15 g  15 g Oral TID Yessica Garcia DO   15 g at 04/23/24 1049    LORazepam tablet 2 mg  2 mg Oral Q4H PRN Yessica Garcia DO        melatonin tablet 6 mg  6 mg Oral Once Bambi Vazquez MD        midodrine tablet 10 mg  10 mg Oral TID Theresa Alvarez MD   10 mg at 04/23/24 1050    multivitamin tablet  1 tablet Oral Daily Kobi Villarreal MD   1 tablet at 04/23/24 1050    mupirocin 2 % ointment   Nasal BID Theresa Alvarez MD   Given at 04/23/24 1052    naloxone 0.4 mg/mL injection 0.02 mg  0.02 mg Intravenous PRN Kobi Villarreal MD        nicotine 14 mg/24 hr 1 patch  1 patch Transdermal Daily Sakina Delong DO   1 patch at 04/23/24 1051    octreotide (SANDOSTATIN) 500 mcg in sodium chloride 0.9% 100 mL infusion  50 mcg/hr Intravenous Continuous West, [place], DO 10 mL/hr at 04/23/24 0656 50 mcg/hr at 04/23/24 0656    pantoprazole injection 40 mg  40 mg Intravenous BID Antonio Colindres DO   40 mg at 04/23/24 1051    potassium, sodium phosphates  280-160-250 mg packet 2 packet  2 packet Oral QID (AC & HS) Yessica Garcia, DO   2 packet at 04/23/24 1050    sodium chloride 0.9% flush 10 mL  10 mL Intravenous Q12H PRN Kobi Villarreal MD        thiamine tablet 100 mg  100 mg Oral Daily Theresa Alvarez MD   100 mg at 04/23/24 1050       Objective:     Vital Signs (Most Recent):  Temp: 97.6 °F (36.4 °C) (04/23/24 1018)  Pulse: 81 (04/23/24 1018)  Resp: (!) 32 (04/23/24 1018)  BP: (!) 113/58 (04/23/24 1018)  SpO2: 98 % (04/23/24 1113) Vital Signs (24h Range):  Temp:  [97.6 °F (36.4 °C)-100.3 °F (37.9 °C)] 97.6 °F (36.4 °C)  Pulse:  [] 81  Resp:  [17-32] 32  SpO2:  [88 %-99 %] 98 %  BP: ()/(48-71) 113/58     Weight: 71.7 kg (158 lb) (04/22/24 0815)  Body mass index is 25.5 kg/m².  Body surface area is 1.83 meters squared.    I/O last 3 completed shifts:  In: 1587.2 [P.O.:250; I.V.:400.2; IV Piggyback:937]  Out: 1400 [Urine:1400]     Physical Exam  Vitals reviewed.   Constitutional:       General: She is not in acute distress.  HENT:      Head: Normocephalic and atraumatic.   Eyes:      General: Scleral icterus present.      Extraocular Movements: Extraocular movements intact.      Pupils: Pupils are equal, round, and reactive to light.   Cardiovascular:      Rate and Rhythm: Normal rate and regular rhythm.      Heart sounds: No murmur heard.  Pulmonary:      Effort: Pulmonary effort is normal. No respiratory distress.      Breath sounds: No wheezing or rales.      Comments: Breathing comfortably on non-rebreather  Abdominal:      General: Abdomen is flat. There is distension.      Palpations: Abdomen is soft.      Tenderness: There is no abdominal tenderness.   Musculoskeletal:         General: No swelling or tenderness. Normal range of motion.      Cervical back: Normal range of motion.      Right lower leg: No edema.      Left lower leg: No edema.   Skin:     General: Skin is warm and dry.      Coloration: Skin is jaundiced.       Findings: Bruising present.      Comments: Telangiectasias on chest   Neurological:      Mental Status: She is alert and oriented to person, place, and time. Mental status is at baseline.       Significant Labs:  CMP:   Recent Labs   Lab 04/23/24  0505   GLU 78   CALCIUM 7.9*   ALBUMIN 2.3*   PROT 5.0*   *   K 4.0   CO2 26   CL 95   BUN 15   CREATININE 1.0   ALKPHOS 147*   ALT 38   *   BILITOT 17.7*     All labs within the past 24 hours have been reviewed.     Significant Imaging:  X-Ray: Reviewed  Assessment/Plan:     Renal/  PB (acute kidney injury)  Hyponatremia    38 year old female with history of alcohol use disorder presenting initially to OSH with jaundice found to be in acute liver failure tranferred to [facility] [place] liver [place] evaluation. Complications include acute cystitis as well as concern for upper GI bleed. Nephrology consulted for up-trending creatinine and hyponatremia. In terms of her PB the patient's Cr was at presumed baseline of 0.7 on 4/20 and has up-trended while admitted to 1.5 on 4/22. Given timing of Cr elevation concern for offending agent given while admitted. She was on vancomycin with a supratherapeutic vanc trough on 4/22 (vanc since discontinued). Additionally, she received IV contrast w/ CTA on 4/20. No other nephrotoxic medications identified. Additional etiologies include pre-renal (as evidenced by urine Na < 10). Her MAPs have been > 65 since admission, do not suspect ischemic injury however she may have low effective circulatory volume if HRS is present. Bilirubin induced nephropathy also on differential. Would recommend IV albumin x 2 days to evaluate for HRS. If no improvement in Cr then favor HRS as contributing etiology.    In terms of hyponatremia Na improving to 132 on 4/23. Suspect secondary to liver dysfunction. Would recommend 1L fluid restriction.    4/23: Cr improving to 1.0. Suspect secondary to multifactorial insult from vancomycin and contrast  load. Low suspicion for HRS and patient unable to complete albumin infusion trial due to hypervolemia.  Urine sediment 4/23 with few granular casts indicating some component of ATN.    -- Recommend spot diuresis per primary, goal net negative 500cc  -- Okay to stop IV albumin, HRS less likely  -- Trend Cr daily  -- Avoid nephrotoxic medications and IV contrast   -- Agree with stopping vancomycin  -- Fluid restriction of 1L daily for hyponatremia  -- Trend Na daily      Thank you for your consult. I will follow-up with patient. Please contact us if you have any additional questions.    Jose Eduardo Olsen MD  Nephrology  [facility] - Telemetry Stepdown

## 2024-04-23 NOTE — ASSESSMENT & PLAN NOTE
Hyponatremia    38 year old female with history of alcohol use disorder presenting initially to OSH with jaundice found to be in acute liver failure tranferred [place] [facility] [place] evaluation. Complications include acute cystitis as well as concern for upper GI bleed. Nephrology consulted for up-trending creatinine and hyponatremia. In terms of her PB the patient's Cr was at presumed baseline of 0.7 on 4/20 and has up-trended while admitted to 1.5 on 4/22. Given timing of Cr elevation concern for offending agent given while admitted. She was on vancomycin with a supratherapeutic vanc trough on 4/22 (vanc since discontinued). Additionally, she received IV contrast w/ CTA on 4/20. No other nephrotoxic medications identified. Additional etiologies include pre-renal (as evidenced by urine Na < 10). Her MAPs have been > 65 since admission, do not suspect ischemic injury however she may have low effective circulatory volume if HRS is present. Bilirubin induced nephropathy also on differential. Would recommend IV albumin x 2 days to evaluate for HRS. If no improvement in Cr then favor HRS as contributing etiology.    In terms of hyponatremia Na improving to 132 on 4/23. Suspect secondary to liver dysfunction. Would recommend 1L fluid restriction.    4/23: Cr improving to 1.0. Suspect secondary to multifactorial insult from vancomycin and contrast load. Low suspicion for HRS and patient unable to complete albumin infusion trial due to hypervolemia.  Urine sediment 4/23 with few granular casts indicating some component of ATN.    -- Recommend spot diuresis per primary, goal net negative 500cc  -- Okay to stop IV albumin, HRS less likely  -- Trend Cr daily  -- Avoid nephrotoxic medications and IV contrast   -- Agree with stopping vancomycin  -- Fluid restriction of 1L daily for hyponatremia  -- Trend Na daily

## 2024-04-23 NOTE — PLAN OF CARE
Problem: Adult Inpatient Plan of Care  Goal: Plan of Care Review  Outcome: Progressing  Goal: Patient-Specific Goal (Individualized)  Outcome: Progressing  Goal: Absence of Hospital-Acquired Illness or Injury  Outcome: Progressing  Goal: Optimal Comfort and Wellbeing  Outcome: Progressing  Goal: Readiness for Transition of Care  Outcome: Progressing     Problem: Fluid and Electrolyte Imbalance (Acute Kidney Injury/Impairment)  Goal: Fluid and Electrolyte Balance  Outcome: Progressing     Problem: Oral Intake Inadequate (Acute Kidney Injury/Impairment)  Goal: Optimal Nutrition Intake  Outcome: Progressing     Problem: Renal Function Impairment (Acute Kidney Injury/Impairment)  Goal: Effective Renal Function  Outcome: Progressing     Problem: Skin Injury Risk Increased  Goal: Skin Health and Integrity  Outcome: Progressing

## 2024-04-23 NOTE — CARE UPDATE
RAPID RESPONSE NURSE PROACTIVE ROUNDING NOTE       Time of Visit: 1045    Admit Date: 2024  LOS: 3  Code Status: Full Code   Date of Visit: 2024  : 1985  Age: 38 y.o.  Sex: female  Race: Unknown  Bed: 8098/8098 A:   MRN: [id number]  Was the patient discharged from an ICU this admission? No   Was the patient discharged from a PACU within last 24 hours? No   Did the patient receive conscious sedation/general anesthesia in last 24 hours? No  Was the patient in the ED within the past 24 hours? No  Was the patient on NIPPV within the past 24 hours? No   Attending Physician: Theresa Alvarez MD  Primary Service: [facility] MED 5   Time spent at the bedside: < 15 min    SITUATION    Notified by previous RRN during handoff.  Reason for alert: increased oxygen requirements  Called to evaluate the patient for Respiratory    BACKGROUND     Why is the patient in the hospital?: Decompensated hepatic cirrhosis    Patient has no past medical history on file.    Last Vitals:  Temp: 97.6 °F (36.4 °C) ( 1018)  Pulse: 81 ( 1018)  Resp: 32 ( 1018)  BP: 113/58 ( 1018)  SpO2: 98 % ( 1113)    24 Hours Vitals Range:  Temp:  [97.6 °F (36.4 °C)-100.3 °F (37.9 °C)]   Pulse:  []   Resp:  [17-32]   BP: ()/(48-71)   SpO2:  [88 %-99 %]     Labs:  Recent Labs     24  0747   WBC 8.80  --  9.26 9.23   HGB 8.5*  --  8.1* 7.3*   HCT 23.4* 25* 23.2* 21.4*   PLT 55*  --  57* 54*       Recent Labs     24  1447 24  1748 24  0730 24  1213 24  0505   *   < > 123*   < > 125* 129* 132*   K 3.3*   < > 3.0*   < > 3.3* 3.5 4.0   CL 82*   < > 85*   < > 87* 92* 95   CO2 27   < > 28   < > 26 27 26   BUN 15   < > 19   < > 17 16 15   CREATININE 1.2   < > 1.5*   < > 1.3 1.2 1.0   *   < > 100   < > 180* 116* 78   PHOS 2.3*  --  2.2*  --   --   --  2.3*   MG 2.2  --  2.1  --   --   --  1.9     < > = values in this interval not displayed.        Recent Labs     04/22/24 2004   PH 7.529*   PCO2 34.2*   PO2 59*   HCO3 28.5*   POCSATURATED 93   BE 6*        ASSESSMENT    Physical Exam  Vitals reviewed.   Constitutional:       Appearance: She is ill-appearing.   Eyes:      Pupils: Pupils are equal, round, and reactive to light.   Pulmonary:      Effort: Tachypnea present.   Abdominal:      General: There is distension.   Skin:     General: Skin is warm and dry.      Coloration: Skin is jaundiced.      Findings: Bruising present.   Neurological:      Mental Status: She is alert and oriented to person, place, and time.       Ms. Srivastava is currently satting 96% on 10L/HFNC, does not appear to be in any distress.     INTERVENTIONS    The patient was seen for Respiratory problem. Staff concerns included increased oxygen requirements. The following interventions were performed: continuous pulse ox monitoring continued and no additional interventions needed at this time.    RECOMMENDATIONS    - Continue continuous cardiac and pulse ox monitoring  - Strict I&O  - Titrate oxygen to maintain SPO2 >90%  - Maintain IV access  - IF patient's oxygen requirements escalate, please reach out to the rapid response team.     PROVIDER ESCALATION    Yes/No  No    Orders received and case discussed with NA.    Disposition: Remain in room 8098.    FOLLOW-UP    Charge Sienna GUY  updated on plan of care. Instructed to call the Rapid Response Nurse, Yue Diggs RN at [phone] for additional questions or concerns.

## 2024-04-23 NOTE — CONSULTS
[facility] - Telemetry Stepdown  Critical Care Medicine  Consult Note    Patient Name: Carola Tovar  MRN: [id number]  Admission Date: 4/20/2024  Hospital Length of Stay: 2 days  Code Status: Full Code  Attending Physician: Theresa Alvarez MD   Primary Care Provider: Roro, Primary Doctor   Principal Problem: Decompensated hepatic cirrhosis    Inpatient consult to Critical Care Medicine  Consult performed by: Trung Brizuela, NP  Consult ordered by: Bambi Vazquez MD        Subjective:     HPI:  39 yo F with alcohol abuse disorder who presented to OSH ED with jaundice. Pt is originally from [place], but have spent the last 1.5 months in [place] visiting a friend. For the past month she has started noticing yellow discoloration in her eyes and skin. Pt reports that she used to drink 48 oz of vodka daily, but for the past week she has taper down to only 16 oz per day. Last drink was yesterday 04/19. She also reports multiple episodes of withdrawal in the past, but no seizure episodes. She also report dysuria and increased urinary frequency. Denies headaches, visual changes, SOB, cough, chest pain, palpitation, nausea, vomiting, abdominal pain, diarrhea, constipation.      At the OSH ED the patient was afebrile tachycardic to 120 with /42. Lab work remarkable for Hb/Hct 7.4/21.0, INR 2.57, Na 120, K 2.5, Mg 0.7. Tbili of 24. U/A nitrite positive. Given IV CTX, Mg and K replacements. Hep studies negative. CT A/P shows cirrhosis and portal HTN, gastric diffuse bowel wall thickening and jose-pancreatic fat stranding. Pt was transferred to [facility] for higher level of care. Pt admitted to hospital medicine for further management of acute on chronic liver failure and hepatology was consulted.     Rapid response activated overnight 04/22 for increasing oxygen requirements.     Hospital/ICU Course:  No notes on file    No past medical history on file.    No past surgical history on file.    Review of patient's  allergies indicates:   Allergen Reactions    Sulfa (sulfonamide antibiotics) Hives       Family History    None       Tobacco Use    Smoking status: Not on file    Smokeless tobacco: Not on file   Substance and Sexual Activity    Alcohol use: Not on file    Drug use: Not on file    Sexual activity: Not on file      Review of Systems   Constitutional:  Positive for fatigue. Negative for fever.   Respiratory:  Positive for chest tightness. Negative for cough and shortness of breath.    Cardiovascular:  Negative for chest pain.   Gastrointestinal:  Negative for nausea and vomiting.   Neurological:  Positive for weakness. Negative for syncope.   Psychiatric/Behavioral:  Negative for confusion.      Objective:     Vital Signs (Most Recent):  Temp: 99.7 °F (37.6 °C) (04/22/24 2043)  Pulse: 98 (04/22/24 2043)  Resp: 17 (04/22/24 1909)  BP: (!) 125/59 (04/22/24 2010)  SpO2: 96 % (04/22/24 2138) Vital Signs (24h Range):  Temp:  [98.4 °F (36.9 °C)-99.8 °F (37.7 °C)] 99.7 °F (37.6 °C)  Pulse:  [] 98  Resp:  [17-19] 17  SpO2:  [83 %-98 %] 96 %  BP: ()/(43-71) 125/59   Weight: 71.7 kg (158 lb)  Body mass index is 25.5 kg/m².      Intake/Output Summary (Last 24 hours) at 4/22/2024 2304  Last data filed at 4/22/2024 2053  Gross per 24 hour   Intake 1587.18 ml   Output 200 ml   Net 1387.18 ml          Physical Exam  Vitals and nursing note reviewed.   Constitutional:       General: She is not in acute distress.     Appearance: She is ill-appearing.   Eyes:      General: Scleral icterus present.   Cardiovascular:      Rate and Rhythm: Normal rate and regular rhythm.   Pulmonary:      Effort: Pulmonary effort is normal. No respiratory distress.      Breath sounds: Rales present.   Abdominal:      General: There is distension.   Skin:     General: Skin is dry.      Capillary Refill: Capillary refill takes less than 2 seconds.      Coloration: Skin is jaundiced.      Findings: Bruising present.      Comments: Multiple  telangectasia's noted   Neurological:      General: No focal deficit present.      Mental Status: She is alert and oriented to person, place, and time.      GCS: GCS eye subscore is 4. GCS verbal subscore is 5. GCS motor subscore is 6.          Vents:  Oxygen Concentration (%): 60 (04/22/24 2138)  Lines/Drains/Airways       Peripheral Intravenous Line  Duration                  Peripheral IV - Single Lumen 04/20/24 1322 Posterior;Right Hand 2 days         Peripheral IV - Single Lumen 04/20/24 2200 20 G Left Antecubital 2 days                  Significant Labs:    CBC/Anemia Profile:  Recent Labs   Lab 04/22/24  0730 04/22/24  1213 04/22/24  1814 04/22/24 2004   WBC 10.23 8.90 8.80  --    HGB 7.9* 7.6* 8.5*  --    HCT 21.9* 20.9* 23.4* 25*   PLT 55* 44* 55*  --    * 105* 109*  --    RDW SEE COMMENT SEE COMMENT SEE COMMENT  --         Chemistries:  Recent Labs   Lab 04/21/24  0443 04/21/24  1114 04/21/24  1447 04/21/24  1748 04/22/24  0730 04/22/24  1213 04/22/24  1814   *   < > 121*   < > 123* 124* 125*   K 3.8   < > 3.3*   < > 3.0* 3.1* 3.3*   CL 79*   < > 82*   < > 85* 86* 87*   CO2 26   < > 27   < > 28 27 26   BUN 13   < > 15   < > 19 18 17   CREATININE 0.8   < > 1.2   < > 1.5* 1.5* 1.3   CALCIUM 6.5*   < > 7.0*   < > 7.2* 7.4* 7.5*   ALBUMIN 2.3*   < >  --    < > 2.1* 2.4* 2.4*   PROT 5.2*   < >  --    < > 4.8* 4.9* 5.2*   BILITOT 22.1*   < >  --    < > 20.6* 20.1* 20.6*   ALKPHOS 151*   < >  --    < > 130 123 138*   ALT 41   < >  --    < > 40 38 41   *   < >  --    < > 109* 105* 115*   MG 2.2  --  2.2  --  2.1  --   --    PHOS 2.5*  --  2.3*  --  2.2*  --   --     < > = values in this interval not displayed.       All pertinent labs within the past 24 hours have been reviewed.    Significant Imaging: I have reviewed all pertinent imaging results/findings within the past 24 hours.     AB  Recent Labs   Lab 04/22/24 2004   PH 7.529*   PO2 59*   PCO2 34.2*   HCO3 28.5*   BE 6*      Assessment/Plan:     Pulmonary  Acute hypoxemic respiratory failure  Patient with Hypoxic Respiratory failure which is Acute.  she is not on home oxygen. Supplemental oxygen was provided and noted- Oxygen Concentration (%):  [] 60    Signs/symptoms of respiratory failure include- tachypnea and increased work of breathing. Contributing diagnoses includes -  fluid volume excess  Labs and images were reviewed. Patient Has recent ABG, which has been reviewed. Will treat underlying causes and adjust management of respiratory failure as follows-     Rapid response activated overnight 4/22 for increasing oxygen demands.   CXR with increasing pulmonary vascular congestion. Given 1x dose of IV lasix after CXR.     -- Start comfort flow. Increase LPM to give some positive pressure and reduce afterload   -- Wean FiO2 for Spo2 > 92%   -- Continue IV Lasix. Target net negative.   -- Strict I&O   -- Fluid restrict   -- Follow up COVID / RSV/ Flu swab   -- If able to produce sputum, obtain sputum culture   -- Continue Rocephin. Can consider broadening abx if resp status does not improve with CF and IV diuresis and/or COVID/RSV/Flu result        Critical Care Daily Checklist:    A: Awake: RASS Goal/Actual Goal:    Actual:     B: Spontaneous Breathing Trial Performed?     C: SAT & SBT Coordinated?  N/A                      D: Delirium: CAM-ICU     E: Early Mobility Performed? Yes   F: Feeding Goal:    Status:     Current Diet Order   Procedures    Diet NPO      AS: Analgesia/Sedation PRN. Cautious.    T: Thromboembolic Prophylaxis Holding   H: HOB > 300 Yes   U: Stress Ulcer Prophylaxis (if needed) Protonix    G: Glucose Control < 180 goal    B: Bowel Function Stool Occurrence: 1   I: Indwelling Catheter (Lines & Perez) Necessity PIV   D: De-escalation of Antimicrobials/Pharmacotherapies Continue Rocephin. Can consider BSA if resp does not improve with comfort flow     Plan for the day/ETD Resp support. IV diuresis      Code Status:  Family/Goals of Care: Full Code       Critical Care Time: 45 minutes  Critical secondary to Patient has a condition that poses threat to life and bodily function: acute hypoxemic respiratory failure     Plan of care to be discussed with Dr. Herring and attestation to follow.      Critical care was time spent personally by me on the following activities: development of treatment plan with patient or surrogate and bedside caregivers, discussions with consultants, evaluation of patient's response to treatment, examination of patient, ordering and performing treatments and interventions, ordering and review of laboratory studies, ordering and review of radiographic studies, pulse oximetry, re-evaluation of patient's condition. This critical care time did not overlap with that of any other provider or involve time for any procedures.    Thank you for your consult. I will follow-up with patient. Please contact us if you have any additional questions.     Trung Brizuela NP  Critical Care Medicine  [facility] - Telemetry Stepdown

## 2024-04-23 NOTE — ASSESSMENT & PLAN NOTE
Patient with Hypoxic Respiratory failure which is Acute.  she is not on home oxygen. Supplemental oxygen was provided and noted- Oxygen Concentration (%):  [] 60    Signs/symptoms of respiratory failure include- tachypnea and increased work of breathing. Contributing diagnoses includes -  fluid volume excess  Labs and images were reviewed. Patient Has recent ABG, which has been reviewed. Will treat underlying causes and adjust management of respiratory failure as follows-     Rapid response activated overnight 4/22 for increasing oxygen demands.   CXR with increasing pulmonary vascular congestion. Given 1x dose of IV lasix after CXR.     -- Start comfort flow. Increase LPM to give some positive pressure and reduce afterload   -- Wean FiO2 for Spo2 > 92%   -- Continue IV Lasix. Target net negative.   -- Strict I&O   -- Fluid restrict   -- Follow up COVID / RSV/ Flu swab

## 2024-04-23 NOTE — SIGNIFICANT EVENT
The patient came to endo requiring O2 and has increased work of breathing. Since her Hb is relatively stable, we will defer EGD at this time as risks outweigh benefits. Continue to optimize. Please re-consult if active bleeding noted.     Reese Church MD  PGY-V, GI

## 2024-04-23 NOTE — SUBJECTIVE & OBJECTIVE
No past medical history on file.    No past surgical history on file.    Review of patient's allergies indicates:   Allergen Reactions    Sulfa (sulfonamide antibiotics) Hives       Family History    None       Tobacco Use    Smoking status: Not on file    Smokeless tobacco: Not on file   Substance and Sexual Activity    Alcohol use: Not on file    Drug use: Not on file    Sexual activity: Not on file      Review of Systems   Constitutional:  Positive for fatigue. Negative for fever.   Respiratory:  Positive for chest tightness. Negative for cough and shortness of breath.    Cardiovascular:  Negative for chest pain.   Gastrointestinal:  Negative for nausea and vomiting.   Neurological:  Positive for weakness. Negative for syncope.   Psychiatric/Behavioral:  Negative for confusion.      Objective:     Vital Signs (Most Recent):  Temp: 99.7 °F (37.6 °C) (04/22/24 2043)  Pulse: 98 (04/22/24 2043)  Resp: 17 (04/22/24 1909)  BP: (!) 125/59 (04/22/24 2010)  SpO2: 96 % (04/22/24 2138) Vital Signs (24h Range):  Temp:  [98.4 °F (36.9 °C)-99.8 °F (37.7 °C)] 99.7 °F (37.6 °C)  Pulse:  [] 98  Resp:  [17-19] 17  SpO2:  [83 %-98 %] 96 %  BP: ()/(43-71) 125/59   Weight: 71.7 kg (158 lb)  Body mass index is 25.5 kg/m².      Intake/Output Summary (Last 24 hours) at 4/22/2024 2304  Last data filed at 4/22/2024 2053  Gross per 24 hour   Intake 1587.18 ml   Output 200 ml   Net 1387.18 ml          Physical Exam  Vitals and nursing note reviewed.   Constitutional:       General: She is not in acute distress.     Appearance: She is ill-appearing.   Eyes:      General: Scleral icterus present.   Cardiovascular:      Rate and Rhythm: Normal rate and regular rhythm.   Pulmonary:      Effort: Pulmonary effort is normal. No respiratory distress.      Breath sounds: Rales present.   Abdominal:      General: There is distension.   Skin:     General: Skin is dry.      Capillary Refill: Capillary refill takes less than 2 seconds.       Coloration: Skin is jaundiced.      Findings: Bruising present.      Comments: Multiple telangectasia's noted   Neurological:      General: No focal deficit present.      Mental Status: She is alert and oriented to person, place, and time.      GCS: GCS eye subscore is 4. GCS verbal subscore is 5. GCS motor subscore is 6.          Vents:  Oxygen Concentration (%): 60 (04/22/24 2138)  Lines/Drains/Airways       Peripheral Intravenous Line  Duration                  Peripheral IV - Single Lumen 04/20/24 1322 Posterior;Right Hand 2 days         Peripheral IV - Single Lumen 04/20/24 2200 20 G Left Antecubital 2 days                  Significant Labs:    CBC/Anemia Profile:  Recent Labs   Lab 04/22/24  0730 04/22/24  1213 04/22/24  1814 04/22/24 2004   WBC 10.23 8.90 8.80  --    HGB 7.9* 7.6* 8.5*  --    HCT 21.9* 20.9* 23.4* 25*   PLT 55* 44* 55*  --    * 105* 109*  --    RDW SEE COMMENT SEE COMMENT SEE COMMENT  --         Chemistries:  Recent Labs   Lab 04/21/24  0443 04/21/24  1114 04/21/24  1447 04/21/24  1748 04/22/24  0730 04/22/24  1213 04/22/24  1814   *   < > 121*   < > 123* 124* 125*   K 3.8   < > 3.3*   < > 3.0* 3.1* 3.3*   CL 79*   < > 82*   < > 85* 86* 87*   CO2 26   < > 27   < > 28 27 26   BUN 13   < > 15   < > 19 18 17   CREATININE 0.8   < > 1.2   < > 1.5* 1.5* 1.3   CALCIUM 6.5*   < > 7.0*   < > 7.2* 7.4* 7.5*   ALBUMIN 2.3*   < >  --    < > 2.1* 2.4* 2.4*   PROT 5.2*   < >  --    < > 4.8* 4.9* 5.2*   BILITOT 22.1*   < >  --    < > 20.6* 20.1* 20.6*   ALKPHOS 151*   < >  --    < > 130 123 138*   ALT 41   < >  --    < > 40 38 41   *   < >  --    < > 109* 105* 115*   MG 2.2  --  2.2  --  2.1  --   --    PHOS 2.5*  --  2.3*  --  2.2*  --   --     < > = values in this interval not displayed.       All pertinent labs within the past 24 hours have been reviewed.    Significant Imaging: I have reviewed all pertinent imaging results/findings within the past 24 hours.

## 2024-04-23 NOTE — ASSESSMENT & PLAN NOTE
Patient with known Cirrhosis with Child's class C. Co-morbidities are present and inclusive of portal hypertension and anemia/pancytopenia.  MELD-Na score calculated; MELD 3.0: 31 at 4/23/2024 11:31 AM  MELD-Na: 28 at 4/23/2024 11:31 AM  Calculated from:  Serum Creatinine: 1.2 mg/dL at 4/23/2024 11:31 AM  Serum Sodium: 133 mmol/L at 4/23/2024 11:31 AM  Total Bilirubin: 18.0 mg/dL at 4/23/2024 11:31 AM  Serum Albumin: 2.3 g/dL at 4/23/2024 11:31 AM  INR(ratio): 1.9 at 4/23/2024  5:05 AM  Age at listing (hypothetical): 38 years  Sex: Female at 4/23/2024 11:31 AM      Continue chronic meds. Etiology likely ETOH. Will avoid any hepatotoxic meds, and monitor CBC/CMP/INR for synthetic function.     For plan please see liver failure

## 2024-04-23 NOTE — RESPIRATORY THERAPY
RAPID RESPONSE RESPIRATORY THERAPY PROACTIVE ROUNDING NOTE             Time of visit: 0855     Code Status: Full Code   : 1985  Bed: 8098/8098 A:   MRN: [id number]  Time spent at the bedside: < 15 min    SITUATION    Evaluated patient for: HFNC Compliance     BACKGROUND    Patient has no past medical history on file.    24 Hours Vitals Range:  Temp:  [97.6 °F (36.4 °C)-100.3 °F (37.9 °C)]   Pulse:  []   Resp:  [17-32]   BP: ()/(48-71)   SpO2:  [88 %-99 %]     Labs:    Recent Labs     24  1447 24  1748 24  0730 24  1213 24  2250 24  0505 24  1131   *   < > 123*   < > 129* 132* 133*   K 3.3*   < > 3.0*   < > 3.5 4.0 3.6   CL 82*   < > 85*   < > 92* 95 95   CO2 27   < > 28   < > 27 26 30*   BUN 15   < > 19   < > 16 15 14   CREATININE 1.2   < > 1.5*   < > 1.2 1.0 1.2   *   < > 100   < > 116* 78 103   PHOS 2.3*  --  2.2*  --   --  2.3*  --    MG 2.2  --  2.1  --   --  1.9  --     < > = values in this interval not displayed.        Recent Labs     24   PH 7.529*   PCO2 34.2*   PO2 59*   HCO3 28.5*   POCSATURATED 93   BE 6*       ASSESSMENT/INTERVENTIONS    MD at bedside, medications ordered, will follow up with RT    Last VS   Temp: 100.3 °F (37.9 °C) ( 115)  Pulse: 95 ( 1154)  Resp: 20 ( 115)  BP: 108/65 ( 115)  SpO2: 97 % (1154)    Level of Consciousness: Level of Consciousness (AVPU): alert  Respiratory Effort: Respiratory Effort: Unlabored, Shallow Expansion/Accessory Muscle Usage: Expansion/Accessory Muscles/Retractions: no use of accessory muscles, no retractions  All Lung Field Breath Sounds: All Lung Fields Breath Sounds: Anterior:, Lateral:, diminished  O2 Device/Concentration: 30L 60%  Was the O2 device able to be weaned? No  Ambu at bedside:      Active Orders   Respiratory Care    Oxygen Continuous     Frequency: Continuous     Number of Occurrences: Until Specified     Order Questions:      Device  type: High flow      Device: High Flow Nasal Cannula (6 -15 Liters)      LPM: 6-15      Titrate O2 per Oxygen Titration Protocol: Yes      To maintain SpO2 goal of: >= 90%      Notify MD of: Inability to achieve desired SpO2; Sudden change in patient status and requires 20% increase in FiO2; Patient requires >60% FiO2    POCT Venous Blood Gas     Frequency: Once     Number of Occurrences: 1 Occurrences     Order Comments: This test should be used for VBGs.  If using this order for other tests (K, creatinine, HCT, PT/INR, lactate etc)  ONLY do so in the case of an emergency or rapid response.Notify Physician if: see parameters below.         Order Questions:      Component: Blood Gas    Pulse Oximetry Continuous     Frequency: Continuous     Number of Occurrences: Until Specified       RECOMMENDATIONS    We recommend: RRT Recs: Continue POC per primary team.      FOLLOW-UP    Please call back the Rapid Response RT, Hina Tee, RRT at x [phone] for any questions or concerns.

## 2024-04-24 LAB
ABO + RH BLD: NORMAL
ADENOVIRUS: NOT DETECTED
ALBUMIN SERPL BCP-MCNC: 2.2 G/DL (ref 3.5–5.2)
ALP SERPL-CCNC: 123 U/L (ref 55–135)
ALT SERPL W/O P-5'-P-CCNC: 35 U/L (ref 10–44)
ANION GAP SERPL CALC-SCNC: 12 MMOL/L (ref 8–16)
ANION GAP SERPL CALC-SCNC: 12 MMOL/L (ref 8–16)
ANION GAP SERPL CALC-SCNC: 13 MMOL/L (ref 8–16)
ANISOCYTOSIS BLD QL SMEAR: SLIGHT
AST SERPL-CCNC: 87 U/L (ref 10–40)
BACTERIA #/AREA URNS AUTO: ABNORMAL /HPF
BASOPHILS # BLD AUTO: 0.05 K/UL (ref 0–0.2)
BASOPHILS NFR BLD: 0.6 % (ref 0–1.9)
BILE AC SERPL-SCNC: 83 MCMOL/L
BILIRUB SERPL-MCNC: 17.7 MG/DL (ref 0.1–1)
BILIRUB UR QL STRIP: ABNORMAL
BLD GP AB SCN CELLS X3 SERPL QL: NORMAL
BORDETELLA PARAPERTUSSIS (IS1001): NOT DETECTED
BORDETELLA PERTUSSIS (PTXP): NOT DETECTED
BUN SERPL-MCNC: 12 MG/DL (ref 6–20)
BUN SERPL-MCNC: 13 MG/DL (ref 6–20)
BUN SERPL-MCNC: 13 MG/DL (ref 6–20)
CALCIUM SERPL-MCNC: 7.8 MG/DL (ref 8.7–10.5)
CALCIUM SERPL-MCNC: 8.1 MG/DL (ref 8.7–10.5)
CALCIUM SERPL-MCNC: 8.3 MG/DL (ref 8.7–10.5)
CHLAMYDIA PNEUMONIAE: NOT DETECTED
CHLORIDE SERPL-SCNC: 95 MMOL/L (ref 95–110)
CHLORIDE SERPL-SCNC: 96 MMOL/L (ref 95–110)
CHLORIDE SERPL-SCNC: 96 MMOL/L (ref 95–110)
CLARITY UR REFRACT.AUTO: ABNORMAL
CO2 SERPL-SCNC: 26 MMOL/L (ref 23–29)
CO2 SERPL-SCNC: 27 MMOL/L (ref 23–29)
CO2 SERPL-SCNC: 27 MMOL/L (ref 23–29)
COLOR UR AUTO: YELLOW
CORONAVIRUS 229E, COMMON COLD VIRUS: NOT DETECTED
CORONAVIRUS HKU1, COMMON COLD VIRUS: NOT DETECTED
CORONAVIRUS NL63, COMMON COLD VIRUS: NOT DETECTED
CORONAVIRUS OC43, COMMON COLD VIRUS: NOT DETECTED
CREAT SERPL-MCNC: 1.1 MG/DL (ref 0.5–1.4)
CREAT SERPL-MCNC: 1.2 MG/DL (ref 0.5–1.4)
CREAT SERPL-MCNC: 1.3 MG/DL (ref 0.5–1.4)
DIFFERENTIAL METHOD BLD: ABNORMAL
EOSINOPHIL # BLD AUTO: 0.2 K/UL (ref 0–0.5)
EOSINOPHIL NFR BLD: 1.9 % (ref 0–8)
ERYTHROCYTE [DISTWIDTH] IN BLOOD BY AUTOMATED COUNT: ABNORMAL % (ref 11.5–14.5)
EST. GFR  (NO RACE VARIABLE): 53.3 ML/MIN/1.73 M^2
EST. GFR  (NO RACE VARIABLE): 58.7 ML/MIN/1.73 M^2
EST. GFR  (NO RACE VARIABLE): >60 ML/MIN/1.73 M^2
FLUBV RNA NPH QL NAA+NON-PROBE: NOT DETECTED
GLUCOSE SERPL-MCNC: 115 MG/DL (ref 70–110)
GLUCOSE SERPL-MCNC: 131 MG/DL (ref 70–110)
GLUCOSE SERPL-MCNC: 132 MG/DL (ref 70–110)
GLUCOSE UR QL STRIP: NEGATIVE
HCT VFR BLD AUTO: 23.8 % (ref 37–48.5)
HGB BLD-MCNC: 7.8 G/DL (ref 12–16)
HGB UR QL STRIP: ABNORMAL
HPIV1 RNA NPH QL NAA+NON-PROBE: NOT DETECTED
HPIV2 RNA NPH QL NAA+NON-PROBE: NOT DETECTED
HPIV3 RNA NPH QL NAA+NON-PROBE: NOT DETECTED
HPIV4 RNA NPH QL NAA+NON-PROBE: NOT DETECTED
HUMAN METAPNEUMOVIRUS: NOT DETECTED
HYALINE CASTS UR QL AUTO: 12 /LPF
HYPOCHROMIA BLD QL SMEAR: ABNORMAL
IMM GRANULOCYTES # BLD AUTO: 0.12 K/UL (ref 0–0.04)
IMM GRANULOCYTES NFR BLD AUTO: 1.4 % (ref 0–0.5)
INFLUENZA A (SUBTYPES H1,H1-2009,H3): NOT DETECTED
INR PPP: 2 (ref 0.8–1.2)
KETONES UR QL STRIP: NEGATIVE
LEUKOCYTE ESTERASE UR QL STRIP: NEGATIVE
LYMPHOCYTES # BLD AUTO: 0.9 K/UL (ref 1–4.8)
LYMPHOCYTES NFR BLD: 10.3 % (ref 18–48)
MAGNESIUM SERPL-MCNC: 1.6 MG/DL (ref 1.6–2.6)
MCH RBC QN AUTO: 38 PG (ref 27–31)
MCHC RBC AUTO-ENTMCNC: 32.8 G/DL (ref 32–36)
MCV RBC AUTO: 116 FL (ref 82–98)
MICROSCOPIC COMMENT: ABNORMAL
MONOCYTES # BLD AUTO: 1.6 K/UL (ref 0.3–1)
MONOCYTES NFR BLD: 19.1 % (ref 4–15)
MYCOPLASMA PNEUMONIAE: NOT DETECTED
NEUTROPHILS # BLD AUTO: 5.6 K/UL (ref 1.8–7.7)
NEUTROPHILS NFR BLD: 66.7 % (ref 38–73)
NITRITE UR QL STRIP: NEGATIVE
NRBC BLD-RTO: 0 /100 WBC
OVALOCYTES BLD QL SMEAR: ABNORMAL
PH UR STRIP: 6 [PH] (ref 5–8)
PHOSPHATE SERPL-MCNC: 3.2 MG/DL (ref 2.7–4.5)
PLATELET # BLD AUTO: 45 K/UL (ref 150–450)
PLATELET BLD QL SMEAR: ABNORMAL
PMV BLD AUTO: 10.6 FL (ref 9.2–12.9)
POIKILOCYTOSIS BLD QL SMEAR: SLIGHT
POLYCHROMASIA BLD QL SMEAR: ABNORMAL
POTASSIUM SERPL-SCNC: 3.5 MMOL/L (ref 3.5–5.1)
POTASSIUM SERPL-SCNC: 3.5 MMOL/L (ref 3.5–5.1)
POTASSIUM SERPL-SCNC: 3.7 MMOL/L (ref 3.5–5.1)
PROT SERPL-MCNC: 4.8 G/DL (ref 6–8.4)
PROT UR QL STRIP: ABNORMAL
PROTHROMBIN TIME: 21.4 SEC (ref 9–12.5)
RBC # BLD AUTO: 2.05 M/UL (ref 4–5.4)
RBC #/AREA URNS AUTO: 54 /HPF (ref 0–4)
RESPIRATORY INFECTION PANEL SOURCE: NORMAL
RSV RNA NPH QL NAA+NON-PROBE: NOT DETECTED
RV+EV RNA NPH QL NAA+NON-PROBE: NOT DETECTED
SARS-COV-2 RNA RESP QL NAA+PROBE: NOT DETECTED
SODIUM SERPL-SCNC: 134 MMOL/L (ref 136–145)
SODIUM SERPL-SCNC: 134 MMOL/L (ref 136–145)
SODIUM SERPL-SCNC: 136 MMOL/L (ref 136–145)
SP GR UR STRIP: 1.02 (ref 1–1.03)
SPECIMEN OUTDATE: NORMAL
SQUAMOUS #/AREA URNS AUTO: 5 /HPF
URN SPEC COLLECT METH UR: ABNORMAL
WBC # BLD AUTO: 8.41 K/UL (ref 3.9–12.7)

## 2024-04-24 PROCEDURE — 25000003 PHARM REV CODE 250

## 2024-04-24 PROCEDURE — 99900035 HC TECH TIME PER 15 MIN (STAT)

## 2024-04-24 PROCEDURE — 63600175 PHARM REV CODE 636 W HCPCS: Performed by: STUDENT IN AN ORGANIZED HEALTH CARE EDUCATION/TRAINING PROGRAM

## 2024-04-24 PROCEDURE — 27000221 HC OXYGEN, UP TO 24 HOURS

## 2024-04-24 PROCEDURE — 83735 ASSAY OF MAGNESIUM: CPT | Performed by: STUDENT IN AN ORGANIZED HEALTH CARE EDUCATION/TRAINING PROGRAM

## 2024-04-24 PROCEDURE — 85610 PROTHROMBIN TIME: CPT | Performed by: STUDENT IN AN ORGANIZED HEALTH CARE EDUCATION/TRAINING PROGRAM

## 2024-04-24 PROCEDURE — 99232 SBSQ HOSP IP/OBS MODERATE 35: CPT | Mod: ,,, | Performed by: HOSPITALIST

## 2024-04-24 PROCEDURE — 84100 ASSAY OF PHOSPHORUS: CPT | Performed by: STUDENT IN AN ORGANIZED HEALTH CARE EDUCATION/TRAINING PROGRAM

## 2024-04-24 PROCEDURE — 81001 URINALYSIS AUTO W/SCOPE: CPT | Performed by: STUDENT IN AN ORGANIZED HEALTH CARE EDUCATION/TRAINING PROGRAM

## 2024-04-24 PROCEDURE — C9113 INJ PANTOPRAZOLE SODIUM, VIA: HCPCS

## 2024-04-24 PROCEDURE — 87040 BLOOD CULTURE FOR BACTERIA: CPT | Performed by: STUDENT IN AN ORGANIZED HEALTH CARE EDUCATION/TRAINING PROGRAM

## 2024-04-24 PROCEDURE — 36415 COLL VENOUS BLD VENIPUNCTURE: CPT | Performed by: STUDENT IN AN ORGANIZED HEALTH CARE EDUCATION/TRAINING PROGRAM

## 2024-04-24 PROCEDURE — 20600001 HC STEP DOWN PRIVATE ROOM

## 2024-04-24 PROCEDURE — 80048 BASIC METABOLIC PNL TOTAL CA: CPT | Mod: 91,XB

## 2024-04-24 PROCEDURE — 94761 N-INVAS EAR/PLS OXIMETRY MLT: CPT

## 2024-04-24 PROCEDURE — 86850 RBC ANTIBODY SCREEN: CPT | Performed by: STUDENT IN AN ORGANIZED HEALTH CARE EDUCATION/TRAINING PROGRAM

## 2024-04-24 PROCEDURE — 63600175 PHARM REV CODE 636 W HCPCS

## 2024-04-24 PROCEDURE — 80053 COMPREHEN METABOLIC PANEL: CPT | Performed by: STUDENT IN AN ORGANIZED HEALTH CARE EDUCATION/TRAINING PROGRAM

## 2024-04-24 PROCEDURE — 80048 BASIC METABOLIC PNL TOTAL CA: CPT | Mod: XB | Performed by: STUDENT IN AN ORGANIZED HEALTH CARE EDUCATION/TRAINING PROGRAM

## 2024-04-24 PROCEDURE — 82610 CYSTATIN C: CPT

## 2024-04-24 PROCEDURE — 85025 COMPLETE CBC W/AUTO DIFF WBC: CPT | Performed by: STUDENT IN AN ORGANIZED HEALTH CARE EDUCATION/TRAINING PROGRAM

## 2024-04-24 PROCEDURE — 87633 RESP VIRUS 12-25 TARGETS: CPT | Performed by: STUDENT IN AN ORGANIZED HEALTH CARE EDUCATION/TRAINING PROGRAM

## 2024-04-24 PROCEDURE — S4991 NICOTINE PATCH NONLEGEND: HCPCS

## 2024-04-24 PROCEDURE — 25000003 PHARM REV CODE 250: Performed by: STUDENT IN AN ORGANIZED HEALTH CARE EDUCATION/TRAINING PROGRAM

## 2024-04-24 RX ORDER — DEXTROSE MONOHYDRATE 50 MG/ML
INJECTION, SOLUTION INTRAVENOUS CONTINUOUS
Status: ACTIVE | OUTPATIENT
Start: 2024-04-24 | End: 2024-04-24

## 2024-04-24 RX ORDER — IBUPROFEN 400 MG/1
400 TABLET ORAL EVERY 6 HOURS PRN
Status: DISCONTINUED | OUTPATIENT
Start: 2024-04-24 | End: 2024-04-25

## 2024-04-24 RX ORDER — PANTOPRAZOLE SODIUM 40 MG/10ML
40 INJECTION, POWDER, LYOPHILIZED, FOR SOLUTION INTRAVENOUS DAILY
Status: DISCONTINUED | OUTPATIENT
Start: 2024-04-25 | End: 2024-04-29

## 2024-04-24 RX ORDER — BENZONATATE 100 MG/1
200 CAPSULE ORAL 3 TIMES DAILY PRN
Status: DISCONTINUED | OUTPATIENT
Start: 2024-04-24 | End: 2024-05-02 | Stop reason: HOSPADM

## 2024-04-24 RX ORDER — PANTOPRAZOLE SODIUM 40 MG/10ML
40 INJECTION, POWDER, LYOPHILIZED, FOR SOLUTION INTRAVENOUS ONCE
Status: DISCONTINUED | OUTPATIENT
Start: 2024-04-24 | End: 2024-04-24

## 2024-04-24 RX ORDER — HYDROMORPHONE HYDROCHLORIDE 2 MG/1
2 TABLET ORAL EVERY 6 HOURS PRN
Status: DISCONTINUED | OUTPATIENT
Start: 2024-04-24 | End: 2024-04-25

## 2024-04-24 RX ADMIN — MIDODRINE HYDROCHLORIDE 10 MG: 5 TABLET ORAL at 08:04

## 2024-04-24 RX ADMIN — THIAMINE HCL TAB 100 MG 100 MG: 100 TAB at 09:04

## 2024-04-24 RX ADMIN — PANTOPRAZOLE SODIUM 40 MG: 40 INJECTION, POWDER, FOR SOLUTION INTRAVENOUS at 10:04

## 2024-04-24 RX ADMIN — MUPIROCIN: 20 OINTMENT TOPICAL at 08:04

## 2024-04-24 RX ADMIN — IBUPROFEN 400 MG: 400 TABLET ORAL at 11:04

## 2024-04-24 RX ADMIN — THERA TABS 1 TABLET: TAB at 09:04

## 2024-04-24 RX ADMIN — MIDODRINE HYDROCHLORIDE 10 MG: 5 TABLET ORAL at 09:04

## 2024-04-24 RX ADMIN — LACTULOSE 15 G: 20 SOLUTION ORAL at 09:04

## 2024-04-24 RX ADMIN — LACTULOSE 15 G: 20 SOLUTION ORAL at 08:04

## 2024-04-24 RX ADMIN — BENZONATATE 200 MG: 100 CAPSULE ORAL at 06:04

## 2024-04-24 RX ADMIN — FOLIC ACID 1 MG: 1 TABLET ORAL at 09:04

## 2024-04-24 RX ADMIN — MUPIROCIN: 20 OINTMENT TOPICAL at 09:04

## 2024-04-24 RX ADMIN — DEXTROSE MONOHYDRATE: 50 INJECTION, SOLUTION INTRAVENOUS at 10:04

## 2024-04-24 RX ADMIN — CEFTRIAXONE 2 G: 2 INJECTION, POWDER, FOR SOLUTION INTRAMUSCULAR; INTRAVENOUS at 03:04

## 2024-04-24 RX ADMIN — MIDODRINE HYDROCHLORIDE 10 MG: 5 TABLET ORAL at 03:04

## 2024-04-24 RX ADMIN — LACTULOSE 15 G: 20 SOLUTION ORAL at 03:04

## 2024-04-24 RX ADMIN — OCTREOTIDE ACETATE 50 MCG/HR: 500 INJECTION, SOLUTION INTRAVENOUS; SUBCUTANEOUS at 04:04

## 2024-04-24 RX ADMIN — HYDROMORPHONE HYDROCHLORIDE 2 MG: 2 TABLET ORAL at 06:04

## 2024-04-24 RX ADMIN — DEXTROSE MONOHYDRATE: 50 INJECTION, SOLUTION INTRAVENOUS at 06:04

## 2024-04-24 RX ADMIN — NICOTINE 1 PATCH: 14 PATCH, EXTENDED RELEASE TRANSDERMAL at 09:04

## 2024-04-24 RX ADMIN — Medication 6 MG: at 12:04

## 2024-04-24 NOTE — SUBJECTIVE & OBJECTIVE
Interval History: Patient was amenable to palliative in [place] for her end stage liver disease. Fever this afternoon that resolved. Low threshold to escalate antibioics to Zosyn if concerned for sepsis.     Review of Systems  Objective:     Vital Signs (Most Recent):  Temp: 98.4 °F (36.9 °C) (04/24/24 1539)  Pulse: 80 (04/24/24 1539)  Resp: 18 (04/24/24 1539)  BP: (!) 104/57 (04/24/24 1539)  SpO2: 96 % (04/24/24 1539) Vital Signs (24h Range):  Temp:  [98.4 °F (36.9 °C)-100.7 °F (38.2 °C)] 98.4 °F (36.9 °C)  Pulse:  [] 80  Resp:  [18-20] 18  SpO2:  [90 %-98 %] 96 %  BP: ()/(49-61) 104/57     Weight: 71.7 kg (158 lb)  Body mass index is 25.5 kg/m².  No intake or output data in the 24 hours ending 04/24/24 1550      Physical Exam  Constitutional:       Appearance: She is ill-appearing.   HENT:      Head: Normocephalic and atraumatic.   Eyes:      General: Scleral icterus present.      Extraocular Movements: Extraocular movements intact.   Cardiovascular:      Rate and Rhythm: Normal rate and regular rhythm.   Pulmonary:      Effort: No respiratory distress.      Breath sounds: No wheezing.      Comments: Crackles on RLL , on 5 L NC  Abdominal:      General: There is distension.      Palpations: Abdomen is soft.   Skin:     Coloration: Skin is jaundiced.   Neurological:      Mental Status: She is alert and oriented to person, place, and time.      Comments: Lethargic, delayed responses         Significant Labs: All pertinent labs within the past 24 hours have been reviewed.    Significant Imaging: I have reviewed all pertinent imaging results/findings within the past 24 hours.

## 2024-04-24 NOTE — ASSESSMENT & PLAN NOTE
Patient with Hypoxic Respiratory failure which is Acute.  she is not on home oxygen. Supplemental oxygen was provided and noted-      .   Signs/symptoms of respiratory failure include- increased work of breathing. Contributing diagnoses includes - Pleural effusion Labs and images were reviewed.  Will treat underlying causes and adjust management of respiratory failure as follows-     -Spot diuresis with Lasix PRN

## 2024-04-24 NOTE — ASSESSMENT & PLAN NOTE
Hyponatremia    38 year old female with history of alcohol use disorder presenting initially to OSH with jaundice found to be in acute liver failure [place] [facility] [place]. Complications include acute cystitis as well as concern for upper GI bleed. Nephrology consulted for up-trending creatinine and hyponatremia. In terms of her PB the patient's Cr was at presumed baseline of 0.7 on 4/20 and has up-trended while admitted to 1.5 on 4/22. Given timing of Cr elevation concern for offending agent given while admitted. She was on vancomycin with a supratherapeutic vanc trough on 4/22 (vanc since discontinued). Additionally, she received IV contrast w/ CTA on 4/20. No other nephrotoxic medications identified. Additional etiologies include pre-renal (as evidenced by urine Na < 10). Her MAPs have been > 65 since admission, do not suspect ischemic injury however she may have low effective circulatory volume if HRS is present. Bilirubin induced nephropathy also on differential. Would recommend IV albumin x 2 days to evaluate for HRS. If no improvement in Cr then favor HRS as contributing etiology.    In terms of hyponatremia Na improving to 132 on 4/23. Suspect secondary to liver dysfunction. Would recommend 1L fluid restriction.    4/23: Cr improving to 1.0. Suspect secondary to multifactorial insult from vancomycin and contrast load. Low suspicion for HRS and patient unable to complete albumin infusion trial due to hypervolemia.  Urine sediment 4/23 with few granular casts indicating some component of ATN.  4/24: Cr stable at 1.1    -- Recommend spot diuresis per primary, goal net negative 500cc  -- Trend Cr daily  -- Avoid nephrotoxic medications and IV contrast  -- Fluid restriction of 1L daily for hyponatremia  -- Trend Na daily   Writer called pt to relay lab results. Writer informed pt of new results as well as provider recommendations.  Pt verbalized understanding and is aware.  All concerns addressed at this encounter.

## 2024-04-24 NOTE — ASSESSMENT & PLAN NOTE
Pt with hx of alcohol use disorder [place] [facility] [place] chronic liver failure. Reports last drink was on 04/19/24. Also has a hx of alcohol withdrawal with no seizure activity.     Alcohol Withdrawal precautions:  - Vitals q4h while awake  - CIWA monitoring  - Lorazepam PRN if 2 criteria are met: Systolic BP>160, Diastolic BP >110 or Pulse >110  - Start Vitamin supplementation- Thiamine, Folic acid, Vit. B12, and Multivitamin qd  RESOLVED

## 2024-04-24 NOTE — PLAN OF CARE
Problem: Adult Inpatient Plan of Care  Goal: Plan of Care Review  Outcome: Progressing  Goal: Patient-Specific Goal (Individualized)  Outcome: Progressing  Goal: Absence of Hospital-Acquired Illness or Injury  Outcome: Progressing  Goal: Optimal Comfort and Wellbeing  Outcome: Progressing  Goal: Readiness for Transition of Care  Outcome: Progressing   Pt rested well. VS WNL. No ss of pain or distress. Call light within reach. Bed in low position. Camera in room. Bed alarm on. Will continue POC.

## 2024-04-24 NOTE — PROGRESS NOTES
[facility] - Telemetry Stepdown  Nephrology  Progress Note    Patient Name: Carola Tovar  MRN: [id number]  Admission Date: 4/20/2024  Hospital Length of Stay: 4 days  Attending Provider: Theresa Alvarez MD   Primary Care Physician: Roro, Primary Doctor  Principal Problem:Decompensated hepatic cirrhosis    Subjective:     HPI: The patient is a 38 year old female with a history of alcohol use disorder who presented to an OSH in [place] with jaundice and scleral icterus. She consumed about 48 oz of vodka daily, decreased to 16 oz per day. Her last drink was 4/19/2024. At the OSH she was found to be in acute liver failure. She was transferred to [facility] for evaluation for liver transplant. She was admitted to hospital medicine. Her UA was concerning for 1+ protein, 3+ blood, 2+ bilirubin, > 100 RBC, 78 WBC. She was started on Vancomycin + ceftriaxone and found to have an elevated vanc trough on 4/22 to 30.3. Additionally she is being evaluated by GI given concern for upper GI bleed due to reported melena. CTA showing: Geographic areas of hypoattenuation involving the left greater than right kidneys on nonenhanced/precontrast series, nonspecific. Findings are nonspecific and of uncertain etiology, could reflect sequela of concentrated urine or metabolic derangement. Nephrology was consulted 4/22 due to up-trending creatinine and persistent hyponatremia. Concern for HRS vs alternative etiology of PB.    The patient reports recent NSAID use. She states that she had dysuria and urinary frequency prior to admission but clarifies that the volume of her urine also decreased during the days prior to admission. The urine became dark in color as well prior to admit.    Interval History: No acute overnight events. Creatinine stable today. Remains on 6-7L nasal canula. FeNa yesterday of < 0.1 indicating pre-renal etiology. Urine sediment yesterday with few muddy brown casts.    Review of patient's allergies indicates:   Allergen  Reactions    Sulfa (sulfonamide antibiotics) Hives     Current Facility-Administered Medications   Medication Dose Route Frequency Provider Last Rate Last Admin    cefTRIAXone (ROCEPHIN) 2 g in sodium chloride 0.9 % 100 mL IVPB (MB+)  2 g Intravenous Q24H Theresa Alvarez MD   Stopped at 04/24/24 0347    dextrose 10% bolus 125 mL 125 mL  12.5 g Intravenous PRN Kobi Villarreal MD        dextrose 10% bolus 250 mL 250 mL  25 g Intravenous PRN Kobi Villarreal MD        dextrose 5 % (D5W) infusion   Intravenous Continuous Theresa Alvarez MD        folic acid tablet 1 mg  1 mg Oral Daily Kobi Villarreal MD   1 mg at 04/24/24 0914    glucagon (human recombinant) injection 1 mg  1 mg Intramuscular PRN Kobi Villarreal MD        glucose chewable tablet 16 g  16 g Oral PRN Kobi Villarreal MD        glucose chewable tablet 24 g  24 g Oral PRN Kobi Villarreal MD        lactulose 20 gram/30 mL solution Soln 15 g  15 g Oral TID Yessica Garcia DO   15 g at 04/24/24 0914    LORazepam tablet 2 mg  2 mg Oral Q4H PRN Yessica Garcia DO        midodrine tablet 10 mg  10 mg Oral TID Theresa Alvarez MD   10 mg at 04/24/24 0914    multivitamin tablet  1 tablet Oral Daily Kobi Villarreal MD   1 tablet at 04/24/24 0914    mupirocin 2 % ointment   Nasal BID Theresa Alvarez MD   Given at 04/24/24 0915    naloxone 0.4 mg/mL injection 0.02 mg  0.02 mg Intravenous PRN Kobi Villarreal MD        nicotine 14 mg/24 hr 1 patch  1 patch Transdermal Daily Sakina Delong DO   1 patch at 04/24/24 0916    octreotide (SANDOSTATIN) 500 mcg in sodium chloride 0.9% 100 mL infusion  50 mcg/hr Intravenous Continuous West, Stewart, DO 10 mL/hr at 04/24/24 0404 50 mcg/hr at 04/24/24 0404    pantoprazole injection 40 mg  40 mg Intravenous BID Antonio Colindres DO   40 mg at 04/23/24 2044    sodium chloride 0.9% flush 10 mL   10 mL Intravenous Q12H PRN Kobi Villarreal MD        thiamine tablet 100 mg  100 mg Oral Daily Theresa Alvarez MD   100 mg at 04/24/24 0914       Objective:     Vital Signs (Most Recent):  Temp: 99.3 °F (37.4 °C) (04/24/24 0723)  Pulse: 93 (04/24/24 0723)  Resp: 20 (04/24/24 0723)  BP: (!) 98/51 (04/24/24 0723)  SpO2: (!) 93 % (04/24/24 0723) Vital Signs (24h Range):  Temp:  [97.6 °F (36.4 °C)-100.3 °F (37.9 °C)] 99.3 °F (37.4 °C)  Pulse:  [] 93  Resp:  [18-32] 20  SpO2:  [90 %-99 %] 93 %  BP: ()/(49-65) 98/51     Weight: 71.7 kg (158 lb) (04/22/24 0815)  Body mass index is 25.5 kg/m².  Body surface area is 1.83 meters squared.    I/O last 3 completed shifts:  In: 38.1 [IV Piggyback:38.1]  Out: 1250 [Urine:1250]     Physical Exam  Vitals reviewed.   Constitutional:       General: She is not in acute distress.  HENT:      Head: Normocephalic and atraumatic.   Eyes:      General: Scleral icterus present.      Extraocular Movements: Extraocular movements intact.      Pupils: Pupils are equal, round, and reactive to light.   Cardiovascular:      Rate and Rhythm: Normal rate and regular rhythm.      Heart sounds: No murmur heard.  Pulmonary:      Effort: Pulmonary effort is normal. No respiratory distress.      Breath sounds: No wheezing or rales.      Comments: Breathing comfortably on non-rebreather  Abdominal:      General: Abdomen is flat. There is distension.      Palpations: Abdomen is soft.      Tenderness: There is no abdominal tenderness.   Musculoskeletal:         General: No swelling or tenderness. Normal range of motion.      Cervical back: Normal range of motion.      Right lower leg: No edema.      Left lower leg: No edema.   Skin:     General: Skin is warm and dry.      Coloration: Skin is jaundiced.      Findings: Bruising present.      Comments: Telangiectasias on chest   Neurological:      Mental Status: She is alert and oriented to person, place, and time. Mental status  is at baseline.          Significant Labs:  CMP:   Recent Labs   Lab 04/24/24  0253   *   CALCIUM 7.8*   ALBUMIN 2.2*   PROT 4.8*   *   K 3.7   CO2 27   CL 95   BUN 13   CREATININE 1.1   ALKPHOS 123   ALT 35   AST 87*   BILITOT 17.7*     All labs within the past 24 hours have been reviewed.     Assessment/Plan:     Renal/  PB (acute kidney injury)  Hyponatremia    38 year old female with history of alcohol use disorder presenting initially to OSH with jaundice found to be in acute liver failure tranferred to [facility] for liver transplant evaluation. Complications include acute cystitis as well as concern for upper GI bleed. Nephrology consulted for up-trending creatinine and hyponatremia. In terms of her PB the patient's Cr was at presumed baseline of 0.7 on 4/20 and has up-trended while admitted to 1.5 on 4/22. Given timing of Cr elevation concern for offending agent given while admitted. She was on vancomycin with a supratherapeutic vanc trough on 4/22 (vanc since discontinued). Additionally, she received IV contrast w/ CTA on 4/20. No other nephrotoxic medications identified. Additional etiologies include pre-renal (as evidenced by urine Na < 10). Her MAPs have been > 65 since admission, do not suspect ischemic injury however she may have low effective circulatory volume if HRS is present. Bilirubin induced nephropathy also on differential. Would recommend IV albumin x 2 days to evaluate for HRS. If no improvement in Cr then favor HRS as contributing etiology.    In terms of hyponatremia Na improving to 132 on 4/23. Suspect secondary to liver dysfunction. Would recommend 1L fluid restriction.    4/23: Cr improving to 1.0. Suspect secondary to multifactorial insult from vancomycin and contrast load. Low suspicion for HRS and patient unable to complete albumin infusion trial due to hypervolemia.  Urine sediment 4/23 with few granular casts indicating some component of ATN.  4/24: Cr stable at 1.1    --  Recommend spot diuresis per primary, goal net negative 500cc  -- Trend Cr daily  -- Avoid nephrotoxic medications and IV contrast  -- Fluid restriction of 1L daily for hyponatremia  -- Trend Na daily      Thank you for your consult. I will follow-up with patient. Please contact us if you have any additional questions.    Jose Eduardo Olsen MD  Nephrology  [facility] - Telemetry Stepdown

## 2024-04-24 NOTE — PROGRESS NOTES
[facility] - Telemetry [facility] Medicine  Progress Note    Patient Name: Carola Tovar  MRN: [id number]  Patient Class: IP- Inpatient   Admission Date: 4/20/2024  Length of Stay: 4 days  Attending Physician: Theresa Alvarez MD  Primary Care Provider: Roro, Primary Doctor        Subjective:     Principal Problem:Decompensated hepatic cirrhosis        HPI:  Ms. Tovar is a 39 yo F with alcohol abuse disorder who presented to OSH ED with jaundice. Pt is originally from [place], but have spent the last 1.5 months in [place] visiting a friend. For the past month she has started noticing yellow discoloration in her eyes and skin. Pt reports that she used to drink 48 oz of vodka daily, but for the past week she has taper down to only 16 oz per day. Last drink was yesterday 04/19. She also reports multiple episodes of withdrawal in the past, but no seizure episodes. She also report dysuria and increased urinary frequency. Denies headaches, visual changes, SOB, cough, chest pain, palpitation, nausea, vomiting, abdominal pain, diarrhea, constipation.     At the OSH ED the patient was afebrile tachycardic to 120 with /42. Lab work remarkable for Hb/Hct 7.4/21.0, INR 2.57, Na 120, K 2.5, Mg 0.7. Tbili of 24. U/A nitrite positive. Given IV CTX, Mg and K replacements. Hep studies negative. CT A/P shows cirrhosis and portal HTN, gastric diffuse bowel wall thickening and jose-pancreatic fat stranding. [place] [facility] [place]. Pt admitted to hospital medicine for further management of acute on chronic liver failure and hepatology was consulted.     Overview/Hospital Course:  Pt admitted to hospital medicine for decompensated hepatic cirrhosis 2/2 to alcohol use complicated by HE and melena. MELD was 31 during admission. GI and Hepatology consulted. Hgb 6.9 pt received 2 units of PRBC. Plts low and elevated INR and PT, cryo, plts and vit k given. Low calcium, calcium gluconate given. US  liver showing ascites, Reversal of flow of the main portal, right portal, and left portal veins, which can be seen with liver disease. Hepatomegaly with heterogeneous echotexture. CTA negative for any intraabdominal bleeding.  Cirrhotic configuration liver with stigmata of portal venous hypertension and moderate volume abdominopelvic ascites. Relatively diffuse colonic wall thickening with submucosal edema could relate to portal colopathy. Sodium trending down to 118, pt AOX4. Critical care consulted, pt stable to continue under hospital medicine services. Initiated on octreotide, midodrine, and albumin. Hepatology consulted for transplant evaluation, deemed not a candidate. Treated with prn ativan for alcohol withdrawal. Developed worsening PB concerning for HRS, nephrology consulted. Patient did not tolerate albumin challenge which made HRS less likely. Patient increased oxygen requirements night to 30L HFNC from 2L NC. MICU recommended continued diuresis. Oxygen requirements improved with spot diuresis Lasix 40 x2. Hepatology did not think she was a candidate for liver transplant due to her active alcohol use despite knowing of her liver disease. Her EGD was delayed on 4/23 due to worsening oxygen requirements overnight. Patient was agreeable to follow with palliative in [place]. She will likely need hospice in the future.       Interval History: Patient was amenable to palliative in [place] for her end stage liver disease. Fever this afternoon that resolved. Low threshold to escalate antibioics to Zosyn if concerned for sepsis.     Review of Systems  Objective:     Vital Signs (Most Recent):  Temp: 98.4 °F (36.9 °C) (04/24/24 1539)  Pulse: 80 (04/24/24 1539)  Resp: 18 (04/24/24 1539)  BP: (!) 104/57 (04/24/24 1539)  SpO2: 96 % (04/24/24 1539) Vital Signs (24h Range):  Temp:  [98.4 °F (36.9 °C)-100.7 °F (38.2 °C)] 98.4 °F (36.9 °C)  Pulse:  [] 80  Resp:  [18-20] 18  SpO2:  [90 %-98 %] 96 %  BP:  ()/(49-61) 104/57     Weight: 71.7 kg (158 lb)  Body mass index is 25.5 kg/m².  No intake or output data in the 24 hours ending 04/24/24 1550      Physical Exam  Constitutional:       Appearance: She is ill-appearing.   HENT:      Head: Normocephalic and atraumatic.   Eyes:      General: Scleral icterus present.      Extraocular Movements: Extraocular movements intact.   Cardiovascular:      Rate and Rhythm: Normal rate and regular rhythm.   Pulmonary:      Effort: No respiratory distress.      Breath sounds: No wheezing.      Comments: Crackles on RLL , on 5 L NC  Abdominal:      General: There is distension.      Palpations: Abdomen is soft.   Skin:     Coloration: Skin is jaundiced.   Neurological:      Mental Status: She is alert and oriented to person, place, and time.      Comments: Lethargic, delayed responses         Significant Labs: All pertinent labs within the past 24 hours have been reviewed.    Significant Imaging: I have reviewed all pertinent imaging results/findings within the past 24 hours.     Assessment/Plan:      * Decompensated hepatic cirrhosis  Pt with a hx of alcohol use disorder presenting from OSH for complains of jaundice. She drinks 48 oz of vodka daily, however has been trying to taper down. Has a history of alcohol withdrawal in the past, but no seizures. As reported per transfer note lab work at OSH was remarkable for  Hb/Hct 7.4/21.0, INR 2.57, Na 120, K 2.5, Mg 0.7. Tbili of 24. . Hep studies negative. CT A/P shows cirrhosis and portal HTN, gastric diffuse bowel wall thickening and jose-pancreatic fat stranding. Physical exam remarkable for sclera icterus, jaundice, asterixis and abdominal distention. US abdomen did not showed a viable pocket for paracentesis.   - US liver showing ascites, Reversal of flow of the main portal, right portal, and left portal veins, which can be seen with liver disease. Hepatomegaly with heterogeneous echotexture.   - CTA negative for any  intraabdominal bleeding.  Cirrhotic configuration liver with stigmata of portal venous hypertension and moderate volume abdominopelvic ascites. Relatively diffuse colonic wall thickening with submucosal edema could relate to portal colopathy.  - Hepatology consulted. Not a candidate for liver transplant at this time given active alcohol use    Plan  - Continue CTX for SBP PPX (5-7 day course)  - PPI for stress ppx  - Octreotide discontinued, monitoring for bleed  - F/u PeTH, anti sm muscle, anti mitochondrial abs   - Continue lactulose for goal 3-4 BMs per day   - No pocket for paracentesis   - Addiction psych consulted  - Not a candidate for liver transplant  - Amenable to palliative   - Future plan to pursue hospice      Acute hypoxemic respiratory failure  Patient with Hypoxic Respiratory failure which is Acute.  she is not on home oxygen. Supplemental oxygen was provided and noted-      .   Signs/symptoms of respiratory failure include- increased work of breathing. Contributing diagnoses includes - Pleural effusion Labs and images were reviewed.  Will treat underlying causes and adjust management of respiratory failure as follows-     -Spot diuresis with Lasix PRN    UGIB (upper gastrointestinal bleed)  [place] [facility] [place] chronic liver failure. Has a history of alcohol use disorder. Physical exam remarkable for right CVA tenderness, jaundice, sclera icterus. Reports having melena on her stool. Hgb 6.9 and plts 42.     CTA negative for any intraabdominal bleeding.  Cirrhotic configuration liver with stigmata of portal venous hypertension and moderate volume abdominopelvic ascites. Relatively diffuse colonic wall thickening with submucosal edema could relate to portal colopathy. Unable to perform EGD due to respiratory status.     Plan  - GI consulted, appreciate recs  - Trend Hgb q6hrs. Transfuse for Hgb <7, unless otherwise indicated  - Maintain IV access with 2 large bore Ivs  - Intravascular  resuscitation/support with IVFs   - Hold all NSAIDs and anticoagulants, unless contraindicated  - Bolus IV pantoprazole 80mg followed by 40mg BID  - Continue Octreotide and CTX  - Please correct any coagulopathy with platelets and FFP for goal of platelets >50K and INR <2.0  - Please notify GI team if there is significant change in patient's clinical status      Acute cystitis  Pt presenting from OSH for concerns of liver failure. Pt reports dysuria and urinary frequency. UA at OSH remarkable for nitrite positive. Given IV CTX.    Plan  - UA and urine culture  - continue rocephin       Acute blood loss anemia  Patient's anemia is currently uncontrolled. Has not received any PRBCs to date. Etiology likely d/t chronic disease due to Chronic liver disease  Current CBC reviewed-   Lab Results   Component Value Date    HGB 7.6 (L) 04/22/2024    HCT 20.9 (L) 04/22/2024     - s/p 2 PRBC  - Type and screen   - Transfusing one unit of blood  - Monitor serial CBC and transfuse if patient becomes hemodynamically unstable, symptomatic or H/H drops below 7/21.      Alcoholic cirrhosis  Patient with known Cirrhosis with Child's class C. Co-morbidities are present and inclusive of portal hypertension and anemia/pancytopenia.  MELD-Na score calculated; MELD 3.0: 31 at 4/23/2024 11:31 AM  MELD-Na: 28 at 4/23/2024 11:31 AM  Calculated from:  Serum Creatinine: 1.2 mg/dL at 4/23/2024 11:31 AM  Serum Sodium: 133 mmol/L at 4/23/2024 11:31 AM  Total Bilirubin: 18.0 mg/dL at 4/23/2024 11:31 AM  Serum Albumin: 2.3 g/dL at 4/23/2024 11:31 AM  INR(ratio): 1.9 at 4/23/2024  5:05 AM  Age at listing (hypothetical): 38 years  Sex: Female at 4/23/2024 11:31 AM      Continue chronic meds. Etiology likely ETOH. Will avoid any hepatotoxic meds, and monitor CBC/CMP/INR for synthetic function.     For plan please see liver failure    Coagulopathy  Pt with hx of alcohol use disorder and recently diagnosed alcohol liver cirrhosis. Lab work remarkable for  elevated INR and Plts 42, PT 25.8 INR 2.5 Fibrinogen 105.  - S/p 2 units prbc, 1 unit plts, and 1 unit cryo on admission    Plan  - Plts goal > 50K  - CBC q6 hrs  - Daily PT/INR    Hyponatremia  Patient has hyponatremia which is uncontrolled,We will aim to correct the sodium by 4-6mEq in 24 hours. We will monitor sodium Daily. The hyponatremia is due to Cirrhosis. We will obtain the following studies: Urine sodium, urine osmolality, serum osmolality. We will treat the hyponatremia with Fluid restriction of:  1.5 liter per day. The patient's sodium results have been reviewed and are listed below.  Recent Labs   Lab 04/24/24  0253   *     Improving with fluid restriction and low sodium diet.   Midodrine, octreotide, albumin infusion   - F/u nephrology recommendations  - D5 given 4/24 to prevent overcorrection     Alcohol use disorder  Pt with hx of alcohol use disorder [place] [facility] [place] chronic [place] failure. Reports last drink was on 04/19/24. Also has a hx of alcohol withdrawal with no seizure activity.     Alcohol Withdrawal precautions:  - Vitals q4h while awake  - CIWA monitoring  - Lorazepam PRN if 2 criteria are met: Systolic BP>160, Diastolic BP >110 or Pulse >110  - Start Vitamin supplementation- Thiamine, Folic acid, Vit. B12, and Multivitamin qd  RESOLVED        VTE Risk Mitigation (From admission, onward)           Ordered     IP VTE LOW RISK PATIENT  Once         04/20/24 1347     Place sequential compression device  Until discontinued         04/20/24 1347                    Discharge Planning   KARSON: 4/30/2024     Code Status: Full Code   Is the patient medically ready for discharge?:     Reason for patient still in hospital (select all that apply): Patient trending condition  Discharge Plan A: Home with family                  Sakina Delong DO  Department of Hospital Medicine   [facility] - Telemetry Stepdown

## 2024-04-24 NOTE — ASSESSMENT & PLAN NOTE
Pt [place] [facility] [place] chronic liver failure. Has a history of alcohol use disorder. Physical exam remarkable for right CVA tenderness, jaundice, sclera icterus. Reports having melena on her stool. Hgb 6.9 and plts 42.     CTA negative for any intraabdominal bleeding.  Cirrhotic configuration liver with stigmata of portal venous hypertension and moderate volume abdominopelvic ascites. Relatively diffuse colonic wall thickening with submucosal edema could relate to portal colopathy. Unable to perform EGD due to respiratory status.     Plan  - GI consulted, appreciate recs  - Trend Hgb q6hrs. Transfuse for Hgb <7, unless otherwise indicated  - Maintain IV access with 2 large bore Ivs  - Intravascular resuscitation/support with IVFs   - Hold all NSAIDs and anticoagulants, unless contraindicated  - Bolus IV pantoprazole 80mg followed by 40mg BID  - Continue Octreotide and CTX  - Please correct any coagulopathy with platelets and FFP for goal of platelets >50K and INR <2.0  - Please notify GI team if there is significant change in patient's clinical status

## 2024-04-24 NOTE — PLAN OF CARE
38F with ETOH use disorder, previous withdrawals but not seizures, presenting for jaundice, found to be in liver failure.     Liver failure/ETOH cirrhosis- Decompensated with coagulopathy, hyponatremia, and now resolved GI bleeding. Abdo US with doppler did not show any portal thrombi. MELD 31 today. Unfortunately, patient was declined for a liver transplant due to continuing to consume alcohol despite knowing about liver disease, lack of insurance. Transfer back to [place] declined 2/2 lack of insurance. Will attempt medical stabilization while discussing goals of care; potentially hospice near her support system/boyfriend in [place].      GIB/Acute blood loss Anemia - Hgb 6.2 upon arrival. Hgb stabilized after 2U PRBC, 1U Plt, 1U cryo, and Vit K. CTA without active large bleed or RP hematoma. Appreciate GI recs; EGD was been deferred given respiratory status. Continue octreotide and protonix. Rocephin for SBP ppx. I suspect her bleeding has stabilized/stopped.     Hyponatremia, PB - Improving. Continue octreotide, midodrine, and aggressive K repletion to increase oncotic pressure/renal perfusion and decrease renal free water retention. Stop albumin given worsened pulmonary edema. Appreciate Nephro recs.      ETOH withdrawal - Mild; responding to PRN ativan.  Last drink day prior to admission.Thiamine.      Coagulopathy - INR improved from 2.5 to 1.9 with Vitamin K. Also received cryo and platelets.      UTI - UA with candida; no treatment required.

## 2024-04-24 NOTE — SUBJECTIVE & OBJECTIVE
Interval History: No acute overnight events. Creatinine stable today. Remains on 6-7L nasal canula. FeNa yesterday of < 0.1 indicating pre-renal etiology. Urine sediment yesterday with few muddy brown casts.    Review of patient's allergies indicates:   Allergen Reactions    Sulfa (sulfonamide antibiotics) Hives     Current Facility-Administered Medications   Medication Dose Route Frequency Provider Last Rate Last Admin    cefTRIAXone (ROCEPHIN) 2 g in sodium chloride 0.9 % 100 mL IVPB (MB+)  2 g Intravenous Q24H Theresa Alvarez MD   Stopped at 04/24/24 0347    dextrose 10% bolus 125 mL 125 mL  12.5 g Intravenous PRN Kobi Villarreal MD        dextrose 10% bolus 250 mL 250 mL  25 g Intravenous PRN Kobi Villarreal MD        dextrose 5 % (D5W) infusion   Intravenous Continuous Theresa Alvarez MD        folic acid tablet 1 mg  1 mg Oral Daily Kobi Villarreal MD   1 mg at 04/24/24 0914    glucagon (human recombinant) injection 1 mg  1 mg Intramuscular PRN Kobi Villarreal MD        glucose chewable tablet 16 g  16 g Oral PRN Kobi Villarreal MD        glucose chewable tablet 24 g  24 g Oral PRN Kobi Villarreal MD        lactulose 20 gram/30 mL solution Soln 15 g  15 g Oral TID Yessica Garcia DO   15 g at 04/24/24 0914    LORazepam tablet 2 mg  2 mg Oral Q4H PRN Yessica Garcia DO        midodrine tablet 10 mg  10 mg Oral TID Theresa Alvarez MD   10 mg at 04/24/24 0914    multivitamin tablet  1 tablet Oral Daily Kobi Villarreal MD   1 tablet at 04/24/24 0914    mupirocin 2 % ointment   Nasal BID Theresa Alvarez MD   Given at 04/24/24 0915    naloxone 0.4 mg/mL injection 0.02 mg  0.02 mg Intravenous PRN Kobi Villarreal MD        nicotine 14 mg/24 hr 1 patch  1 patch Transdermal Daily Sakina Delong DO   1 patch at 04/24/24 0916    octreotide (SANDOSTATIN) 500 mcg in sodium chloride 0.9%  100 mL infusion  50 mcg/hr Intravenous Continuous [place], [place], DO 10 mL/hr at 04/24/24 0404 50 mcg/hr at 04/24/24 0404    pantoprazole injection 40 mg  40 mg Intravenous BID Antonio Colindres DO   40 mg at 04/23/24 2044    sodium chloride 0.9% flush 10 mL  10 mL Intravenous Q12H PRN Kobi Villarreal MD        thiamine tablet 100 mg  100 mg Oral Daily Theresa Alvarez MD   100 mg at 04/24/24 0914       Objective:     Vital Signs (Most Recent):  Temp: 99.3 °F (37.4 °C) (04/24/24 0723)  Pulse: 93 (04/24/24 0723)  Resp: 20 (04/24/24 0723)  BP: (!) 98/51 (04/24/24 0723)  SpO2: (!) 93 % (04/24/24 0723) Vital Signs (24h Range):  Temp:  [97.6 °F (36.4 °C)-100.3 °F (37.9 °C)] 99.3 °F (37.4 °C)  Pulse:  [] 93  Resp:  [18-32] 20  SpO2:  [90 %-99 %] 93 %  BP: ()/(49-65) 98/51     Weight: 71.7 kg (158 lb) (04/22/24 0815)  Body mass index is 25.5 kg/m².  Body surface area is 1.83 meters squared.    I/O last 3 completed shifts:  In: 38.1 [IV Piggyback:38.1]  Out: 1250 [Urine:1250]     Physical Exam  Vitals reviewed.   Constitutional:       General: She is not in acute distress.  HENT:      Head: Normocephalic and atraumatic.   Eyes:      General: Scleral icterus present.      Extraocular Movements: Extraocular movements intact.      Pupils: Pupils are equal, round, and reactive to light.   Cardiovascular:      Rate and Rhythm: Normal rate and regular rhythm.      Heart sounds: No murmur heard.  Pulmonary:      Effort: Pulmonary effort is normal. No respiratory distress.      Breath sounds: No wheezing or rales.      Comments: Breathing comfortably on non-rebreather  Abdominal:      General: Abdomen is flat. There is distension.      Palpations: Abdomen is soft.      Tenderness: There is no abdominal tenderness.   Musculoskeletal:         General: No swelling or tenderness. Normal range of motion.      Cervical back: Normal range of motion.      Right lower leg: No edema.      Left lower leg: No  edema.   Skin:     General: Skin is warm and dry.      Coloration: Skin is jaundiced.      Findings: Bruising present.      Comments: Telangiectasias on chest   Neurological:      Mental Status: She is alert and oriented to person, place, and time. Mental status is at baseline.          Significant Labs:  CMP:   Recent Labs   Lab 04/24/24  0253   *   CALCIUM 7.8*   ALBUMIN 2.2*   PROT 4.8*   *   K 3.7   CO2 27   CL 95   BUN 13   CREATININE 1.1   ALKPHOS 123   ALT 35   AST 87*   BILITOT 17.7*     All labs within the past 24 hours have been reviewed.

## 2024-04-24 NOTE — CLINICAL REVIEW
IP Sepsis Screen (most recent)       Sepsis Screen (IP) - 04/24/24 5918       Is the patient's history or complaint suggestive of a possible infection? Yes  -CB    Are there at least two of the following signs and symptoms present? Yes  -CB    Sepsis signs/symptoms - Hyper or Hypothermia Hyperthermia >100.4 or Hypothermia < 96.8  -CB    Sepsis signs/symptoms - Tachycardia Tachycardia     >90  -CB    Are any of the following organ dysfunction criteria present and not considered to be due to a chronic condition? Yes   cirrhosis -CB    Organ Dysfunction Criteria Total Bilirubin > 2.0 Platelet count < 100,000  -CB    Organ Dysfunction Criteria Lactate > 2.0  -CB    Organ Dysfunction Criteria INR > 1.5 or aPTT > 60  -CB    Initiate Sepsis Protocol No  -CB    Reason sepsis not considered Pt. receiving appropriate management  -CB              User Key  (r) = Recorded By, (t) = Taken By, (c) = Cosigned By      Initials Name    Alyson Christian, RN

## 2024-04-24 NOTE — PHARMACY MED REC
"  Admission Medication History     The home medication history was taken by Sheree Waggoner.    You may go to "Admission" then "Reconcile Home Medications" tabs to review and/or act upon these items.     The home medication list has been updated by the Pharmacy department.   Please read ALL comments highlighted in yellow.   Please address this information as you see fit.    Feel free to contact us if you have any questions or require assistance.        Medications listed below were obtained from: Patient/family and Analytic software- [place]      Potential issues to be addressed PRIOR TO DISCHARGE  Spoke with patient, she states she takes three fluid medications and Is unsure of the names. I asked her for her pharmacy which she states was YES.[facility] in [place] and was told they Doesn't have any patient by name nor  in their system. I also tried calling  but phone voicemail is not set up at this time. Please advise      Sheree Waggoner  EXT [phone]               .          "

## 2024-04-24 NOTE — ASSESSMENT & PLAN NOTE
Pt with a hx of alcohol use disorder presenting from OSH for complains of jaundice. She drinks 48 oz of vodka daily, however has been trying to taper down. Has a history of alcohol withdrawal in the past, but no seizures. As reported per transfer note lab work at OSH was remarkable for  Hb/Hct 7.4/21.0, INR 2.57, Na 120, K 2.5, Mg 0.7. Tbili of 24. . Hep studies negative. CT A/P shows cirrhosis and portal HTN, gastric diffuse bowel wall thickening and jose-pancreatic fat stranding. Physical exam remarkable for sclera icterus, jaundice, asterixis and abdominal distention. US abdomen did not showed a viable pocket for paracentesis.   - US liver showing ascites, Reversal of flow of the main portal, right portal, and left portal veins, which can be seen with liver disease. Hepatomegaly with heterogeneous echotexture.   - CTA negative for any intraabdominal bleeding.  Cirrhotic configuration liver with stigmata of portal venous hypertension and moderate volume abdominopelvic ascites. Relatively diffuse colonic wall thickening with submucosal edema could relate to portal colopathy.  - Hepatology consulted. Not a candidate for liver transplant at this time given active alcohol use    Plan  - Continue CTX for SBP PPX (5-7 day course)  - PPI for stress ppx  - Octreotide discontinued, monitoring for bleed  - F/u PeTH, anti sm muscle, anti mitochondrial abs   - Continue lactulose for goal 3-4 BMs per day   - No pocket for paracentesis   - Addiction psych consulted  - Not a candidate for liver transplant  - Amenable to palliative   - Future plan to pursue hospice

## 2024-04-24 NOTE — ASSESSMENT & PLAN NOTE
Patient has hyponatremia which is uncontrolled,We will aim to correct the sodium by 4-6mEq in 24 hours. We will monitor sodium Daily. The hyponatremia is due to Cirrhosis. We will obtain the following studies: Urine sodium, urine osmolality, serum osmolality. We will treat the hyponatremia with Fluid restriction of:  1.5 liter per day. The patient's sodium results have been reviewed and are listed below.  Recent Labs   Lab 04/24/24  0253   *     Improving with fluid restriction and low sodium diet.   Midodrine, octreotide, albumin infusion   - F/u nephrology recommendations  - D5 given 4/24 to prevent overcorrection

## 2024-04-24 NOTE — PLAN OF CARE
[facility] for [place] Medicaid. Will continue to follow.    Ania White RN  Ext [phone]

## 2024-04-24 NOTE — RESPIRATORY THERAPY
RAPID RESPONSE RESPIRATORY THERAPY PROACTIVE ROUNDING NOTE             Time of visit: 1015      Code Status: Full Code   : 1985  Bed: 8098/8098 A:   MRN: [id number]  Time spent at the bedside: < 15 min    SITUATION    Evaluated patient for: HFNC Compliance     BACKGROUND    Patient has no past medical history on file.    24 Hours Vitals Range:  Temp:  [98.4 °F (36.9 °C)-100.7 °F (38.2 °C)]   Pulse:  []   Resp:  [18-20]   BP: ()/(49-62)   SpO2:  [90 %-98 %]     Labs:    Recent Labs     24  0730 24  1213 24  0505 24  1131 24  1808 24  0253 24  1617   *   < > 132*   < > 134* 134* 136   K 3.0*   < > 4.0   < > 3.7 3.7 3.5   CL 85*   < > 95   < > 95 95 96   CO2 28   < > 26   < > 28 27 27   BUN 19   < > 15   < > 15 13 13   CREATININE 1.5*   < > 1.0   < > 1.0 1.1 1.3      < > 78   < > 116* 131* 115*   PHOS 2.2*  --  2.3*  --   --  3.2  --    MG 2.1  --  1.9  --   --  1.6  --     < > = values in this interval not displayed.        Recent Labs     24   PH 7.529*   PCO2 34.2*   PO2 59*   HCO3 28.5*   POCSATURATED 93   BE 6*       ASSESSMENT/INTERVENTIONS        Last VS   Temp: 98.9 °F (37.2 °C) (1623)  Pulse: 75 (1623)  Resp: 18 ( 153)  BP: 102/62 (1623)  SpO2: 97 % (1623)    Level of Consciousness: Level of Consciousness (AVPU): alert  Respiratory Effort: Respiratory Effort: Normal, Unlabored Expansion/Accessory Muscle Usage: Expansion/Accessory Muscles/Retractions: no use of accessory muscles, no retractions, expansion symmetric  All Lung Field Breath Sounds: All Lung Fields Breath Sounds: Anterior:, Lateral:, diminished  O2 Device/Concentration: 6L  Was the O2 device able to be weaned? No  Ambu at bedside:      Active Orders   Respiratory Care    Oxygen Continuous     Frequency: Continuous     Number of Occurrences: Until Specified     Order Questions:      Device type: High flow      Device: High Flow Nasal  Cannula (6 -15 Liters)      LPM: 6-15      Titrate O2 per Oxygen Titration Protocol: Yes      To maintain SpO2 goal of: >= 90%      Notify MD of: Inability to achieve desired SpO2; Sudden change in patient status and requires 20% increase in FiO2; Patient requires >60% FiO2    POCT Venous Blood Gas     Frequency: Once     Number of Occurrences: 1 Occurrences     Order Comments: This test should be used for VBGs.  If using this order for other tests (K, creatinine, HCT, PT/INR, lactate etc)  ONLY do so in the case of an emergency or rapid response.Notify Physician if: see parameters below.         Order Questions:      Component: Blood Gas    Pulse Oximetry Continuous     Frequency: Continuous     Number of Occurrences: Until Specified       RECOMMENDATIONS    We recommend: RRT Recs: Continue POC per primary team.      FOLLOW-UP    Please call back the Rapid Response RT, Hina Tee, RRT at x [phone] for any questions or concerns.

## 2024-04-25 LAB
ACANTHOCYTES BLD QL SMEAR: PRESENT
ANION GAP SERPL CALC-SCNC: 6 MMOL/L (ref 8–16)
ANION GAP SERPL CALC-SCNC: 8 MMOL/L (ref 8–16)
ANION GAP SERPL CALC-SCNC: 9 MMOL/L (ref 8–16)
ANISOCYTOSIS BLD QL SMEAR: SLIGHT
BACTERIA BLD CULT: NORMAL
BACTERIA BLD CULT: NORMAL
BASO STIPL BLD QL SMEAR: ABNORMAL
BASOPHILS # BLD AUTO: 0.06 K/UL (ref 0–0.2)
BASOPHILS NFR BLD: 0.8 % (ref 0–1.9)
BUN SERPL-MCNC: 12 MG/DL (ref 6–20)
BUN SERPL-MCNC: 12 MG/DL (ref 6–20)
BUN SERPL-MCNC: 14 MG/DL (ref 6–20)
BURR CELLS BLD QL SMEAR: ABNORMAL
CALCIUM SERPL-MCNC: 7.7 MG/DL (ref 8.7–10.5)
CALCIUM SERPL-MCNC: 7.8 MG/DL (ref 8.7–10.5)
CALCIUM SERPL-MCNC: 8 MG/DL (ref 8.7–10.5)
CHLORIDE SERPL-SCNC: 97 MMOL/L (ref 95–110)
CO2 SERPL-SCNC: 28 MMOL/L (ref 23–29)
CO2 SERPL-SCNC: 29 MMOL/L (ref 23–29)
CO2 SERPL-SCNC: 30 MMOL/L (ref 23–29)
CREAT SERPL-MCNC: 1 MG/DL (ref 0.5–1.4)
CREAT SERPL-MCNC: 1.1 MG/DL (ref 0.5–1.4)
CREAT SERPL-MCNC: 1.3 MG/DL (ref 0.5–1.4)
CYSTATIN C SERPL-MCNC: 1.25 MG/L (ref 0.63–1.03)
DIFFERENTIAL METHOD BLD: ABNORMAL
DOHLE BOD BLD QL SMEAR: PRESENT
EOSINOPHIL # BLD AUTO: 0.3 K/UL (ref 0–0.5)
EOSINOPHIL NFR BLD: 3.6 % (ref 0–8)
ERYTHROCYTE [DISTWIDTH] IN BLOOD BY AUTOMATED COUNT: ABNORMAL % (ref 11.5–14.5)
EST. GFR  (NO RACE VARIABLE): 53.3 ML/MIN/1.73 M^2
EST. GFR  (NO RACE VARIABLE): >60 ML/MIN/1.73 M^2
EST. GFR  (NO RACE VARIABLE): >60 ML/MIN/1.73 M^2
GFR/BSA.PRED SERPLBLD CYS-BASED-ARV: 58 ML/MIN/BSA
GLUCOSE SERPL-MCNC: 100 MG/DL (ref 70–110)
GLUCOSE SERPL-MCNC: 126 MG/DL (ref 70–110)
GLUCOSE SERPL-MCNC: 133 MG/DL (ref 70–110)
HCT VFR BLD AUTO: 23 % (ref 37–48.5)
HGB BLD-MCNC: 7.8 G/DL (ref 12–16)
HYPOCHROMIA BLD QL SMEAR: ABNORMAL
IMM GRANULOCYTES # BLD AUTO: 0.12 K/UL (ref 0–0.04)
IMM GRANULOCYTES NFR BLD AUTO: 1.5 % (ref 0–0.5)
INR PPP: 1.9 (ref 0.8–1.2)
LYMPHOCYTES # BLD AUTO: 1.1 K/UL (ref 1–4.8)
LYMPHOCYTES NFR BLD: 14.6 % (ref 18–48)
MAGNESIUM SERPL-MCNC: 1.5 MG/DL (ref 1.6–2.6)
MCH RBC QN AUTO: 38.4 PG (ref 27–31)
MCHC RBC AUTO-ENTMCNC: 33.9 G/DL (ref 32–36)
MCV RBC AUTO: 113 FL (ref 82–98)
MONOCYTES # BLD AUTO: 1.6 K/UL (ref 0.3–1)
MONOCYTES NFR BLD: 21 % (ref 4–15)
NEUTROPHILS # BLD AUTO: 4.6 K/UL (ref 1.8–7.7)
NEUTROPHILS NFR BLD: 58.5 % (ref 38–73)
NRBC BLD-RTO: 0 /100 WBC
OHS QRS DURATION: 112 MS
OHS QTC CALCULATION: 486 MS
PHOSPHATE SERPL-MCNC: 3.2 MG/DL (ref 2.7–4.5)
PLATELET # BLD AUTO: 35 K/UL (ref 150–450)
PLATELET BLD QL SMEAR: ABNORMAL
PMV BLD AUTO: 11.6 FL (ref 9.2–12.9)
POLYCHROMASIA BLD QL SMEAR: ABNORMAL
POTASSIUM SERPL-SCNC: 3.3 MMOL/L (ref 3.5–5.1)
POTASSIUM SERPL-SCNC: 3.3 MMOL/L (ref 3.5–5.1)
POTASSIUM SERPL-SCNC: 3.5 MMOL/L (ref 3.5–5.1)
PROTHROMBIN TIME: 20.1 SEC (ref 9–12.5)
RBC # BLD AUTO: 2.03 M/UL (ref 4–5.4)
SMOOTH MUSCLE AB TITR SER IF: NORMAL {TITER}
SODIUM SERPL-SCNC: 133 MMOL/L (ref 136–145)
SODIUM SERPL-SCNC: 133 MMOL/L (ref 136–145)
SODIUM SERPL-SCNC: 135 MMOL/L (ref 136–145)
TOXIC GRANULES BLD QL SMEAR: PRESENT
WBC # BLD AUTO: 7.82 K/UL (ref 3.9–12.7)
WBC TOXIC VACUOLES BLD QL SMEAR: PRESENT

## 2024-04-25 PROCEDURE — 99232 SBSQ HOSP IP/OBS MODERATE 35: CPT | Mod: ,,, | Performed by: HOSPITALIST

## 2024-04-25 PROCEDURE — 25000003 PHARM REV CODE 250

## 2024-04-25 PROCEDURE — 36415 COLL VENOUS BLD VENIPUNCTURE: CPT | Mod: XB

## 2024-04-25 PROCEDURE — 85025 COMPLETE CBC W/AUTO DIFF WBC: CPT | Performed by: STUDENT IN AN ORGANIZED HEALTH CARE EDUCATION/TRAINING PROGRAM

## 2024-04-25 PROCEDURE — S4991 NICOTINE PATCH NONLEGEND: HCPCS

## 2024-04-25 PROCEDURE — 83735 ASSAY OF MAGNESIUM: CPT | Performed by: STUDENT IN AN ORGANIZED HEALTH CARE EDUCATION/TRAINING PROGRAM

## 2024-04-25 PROCEDURE — 85610 PROTHROMBIN TIME: CPT | Performed by: STUDENT IN AN ORGANIZED HEALTH CARE EDUCATION/TRAINING PROGRAM

## 2024-04-25 PROCEDURE — 80048 BASIC METABOLIC PNL TOTAL CA: CPT

## 2024-04-25 PROCEDURE — 20600001 HC STEP DOWN PRIVATE ROOM

## 2024-04-25 PROCEDURE — 84100 ASSAY OF PHOSPHORUS: CPT | Performed by: STUDENT IN AN ORGANIZED HEALTH CARE EDUCATION/TRAINING PROGRAM

## 2024-04-25 PROCEDURE — 63600175 PHARM REV CODE 636 W HCPCS

## 2024-04-25 PROCEDURE — 63700000 PHARM REV CODE 250 ALT 637 W/O HCPCS

## 2024-04-25 PROCEDURE — C9113 INJ PANTOPRAZOLE SODIUM, VIA: HCPCS

## 2024-04-25 PROCEDURE — 93005 ELECTROCARDIOGRAM TRACING: CPT

## 2024-04-25 PROCEDURE — 93010 ELECTROCARDIOGRAM REPORT: CPT | Mod: ,,, | Performed by: INTERNAL MEDICINE

## 2024-04-25 PROCEDURE — 25000003 PHARM REV CODE 250: Performed by: STUDENT IN AN ORGANIZED HEALTH CARE EDUCATION/TRAINING PROGRAM

## 2024-04-25 PROCEDURE — 80048 BASIC METABOLIC PNL TOTAL CA: CPT | Mod: 91

## 2024-04-25 PROCEDURE — 36415 COLL VENOUS BLD VENIPUNCTURE: CPT | Performed by: STUDENT IN AN ORGANIZED HEALTH CARE EDUCATION/TRAINING PROGRAM

## 2024-04-25 PROCEDURE — 63600175 PHARM REV CODE 636 W HCPCS: Performed by: STUDENT IN AN ORGANIZED HEALTH CARE EDUCATION/TRAINING PROGRAM

## 2024-04-25 RX ORDER — HYDROMORPHONE HYDROCHLORIDE 2 MG/1
2 TABLET ORAL 2 TIMES DAILY PRN
Status: DISCONTINUED | OUTPATIENT
Start: 2024-04-25 | End: 2024-05-02 | Stop reason: HOSPADM

## 2024-04-25 RX ORDER — GUAIFENESIN AND DEXTROMETHORPHAN HYDROBROMIDE 10; 100 MG/5ML; MG/5ML
5 SYRUP ORAL EVERY 4 HOURS PRN
Status: DISCONTINUED | OUTPATIENT
Start: 2024-04-25 | End: 2024-04-25

## 2024-04-25 RX ORDER — DEXTROSE MONOHYDRATE 50 MG/ML
INJECTION, SOLUTION INTRAVENOUS CONTINUOUS
Status: ACTIVE | OUTPATIENT
Start: 2024-04-25 | End: 2024-04-25

## 2024-04-25 RX ORDER — GUAIFENESIN AND DEXTROMETHORPHAN HYDROBROMIDE 10; 100 MG/5ML; MG/5ML
5 SYRUP ORAL ONCE AS NEEDED
Status: COMPLETED | OUTPATIENT
Start: 2024-04-25 | End: 2024-04-25

## 2024-04-25 RX ORDER — POTASSIUM CHLORIDE 20 MEQ/1
40 TABLET, EXTENDED RELEASE ORAL ONCE
Status: DISCONTINUED | OUTPATIENT
Start: 2024-04-25 | End: 2024-04-25

## 2024-04-25 RX ORDER — POTASSIUM CHLORIDE 20 MEQ/1
40 TABLET, EXTENDED RELEASE ORAL
Status: COMPLETED | OUTPATIENT
Start: 2024-04-25 | End: 2024-04-25

## 2024-04-25 RX ORDER — AZITHROMYCIN 250 MG/1
500 TABLET, FILM COATED ORAL DAILY
Status: COMPLETED | OUTPATIENT
Start: 2024-04-25 | End: 2024-04-27

## 2024-04-25 RX ORDER — MAGNESIUM SULFATE HEPTAHYDRATE 40 MG/ML
2 INJECTION, SOLUTION INTRAVENOUS ONCE
Status: COMPLETED | OUTPATIENT
Start: 2024-04-25 | End: 2024-04-25

## 2024-04-25 RX ADMIN — MIDODRINE HYDROCHLORIDE 10 MG: 5 TABLET ORAL at 08:04

## 2024-04-25 RX ADMIN — MAGNESIUM SULFATE HEPTAHYDRATE 2 G: 40 INJECTION, SOLUTION INTRAVENOUS at 10:04

## 2024-04-25 RX ADMIN — GUAIFENESIN AND DEXTROMETHORPHAN 5 ML: 100; 10 SYRUP ORAL at 01:04

## 2024-04-25 RX ADMIN — CEFTRIAXONE 2 G: 2 INJECTION, POWDER, FOR SOLUTION INTRAMUSCULAR; INTRAVENOUS at 01:04

## 2024-04-25 RX ADMIN — LACTULOSE 15 G: 20 SOLUTION ORAL at 09:04

## 2024-04-25 RX ADMIN — FOLIC ACID 1 MG: 1 TABLET ORAL at 09:04

## 2024-04-25 RX ADMIN — PANTOPRAZOLE SODIUM 40 MG: 40 INJECTION, POWDER, FOR SOLUTION INTRAVENOUS at 10:04

## 2024-04-25 RX ADMIN — MIDODRINE HYDROCHLORIDE 10 MG: 5 TABLET ORAL at 09:04

## 2024-04-25 RX ADMIN — POTASSIUM CHLORIDE 40 MEQ: 1500 TABLET, EXTENDED RELEASE ORAL at 03:04

## 2024-04-25 RX ADMIN — HYDROMORPHONE HYDROCHLORIDE 2 MG: 2 TABLET ORAL at 12:04

## 2024-04-25 RX ADMIN — BENZONATATE 200 MG: 100 CAPSULE ORAL at 09:04

## 2024-04-25 RX ADMIN — NICOTINE 1 PATCH: 14 PATCH, EXTENDED RELEASE TRANSDERMAL at 09:04

## 2024-04-25 RX ADMIN — THIAMINE HCL TAB 100 MG 100 MG: 100 TAB at 09:04

## 2024-04-25 RX ADMIN — HYDROMORPHONE HYDROCHLORIDE 2 MG: 2 TABLET ORAL at 09:04

## 2024-04-25 RX ADMIN — AZITHROMYCIN DIHYDRATE 500 MG: 250 TABLET ORAL at 09:04

## 2024-04-25 RX ADMIN — DEXTROSE MONOHYDRATE: 50 INJECTION, SOLUTION INTRAVENOUS at 03:04

## 2024-04-25 RX ADMIN — THERA TABS 1 TABLET: TAB at 09:04

## 2024-04-25 RX ADMIN — POTASSIUM CHLORIDE 40 MEQ: 1500 TABLET, EXTENDED RELEASE ORAL at 06:04

## 2024-04-25 RX ADMIN — MUPIROCIN: 20 OINTMENT TOPICAL at 09:04

## 2024-04-25 RX ADMIN — LACTULOSE 15 G: 20 SOLUTION ORAL at 08:04

## 2024-04-25 NOTE — H&P
Inpatient Radiology Pre-procedure Note    History of Present Illness:  Carola Tovar is a 38 y.o. female who presents for ultrasound guided paracentesis.  Admission H&P reviewed.  No past medical history on file.  Past Surgical History:   Procedure Laterality Date    ESOPHAGOGASTRODUODENOSCOPY N/A 4/23/2024    Procedure: EGD (ESOPHAGOGASTRODUODENOSCOPY);  Surgeon: Manuel Moore MD;  Location: [address];  Service: Endoscopy;  Laterality: N/A;       Review of Systems:   As documented in primary team H&P    Home Meds:   Prior to Admission medications    Not on File     Scheduled Meds:   Current Facility-Administered Medications   Medication Dose Route Frequency    azithromycin  500 mg Oral Daily    folic acid  1 mg Oral Daily    lactulose  15 g Oral TID    magnesium sulfate IVPB  2 g Intravenous Once    midodrine  10 mg Oral TID    multivitamin  1 tablet Oral Daily    nicotine  1 patch Transdermal Daily    pantoprazole  40 mg Intravenous Daily    thiamine  100 mg Oral Daily     Continuous Infusions:   Current Facility-Administered Medications   Medication Dose Route Frequency Last Rate Last Admin     PRN Meds:  Current Facility-Administered Medications:     benzonatate, 200 mg, Oral, TID PRN    dextrose 10%, 12.5 g, Intravenous, PRN    dextrose 10%, 25 g, Intravenous, PRN    glucagon (human recombinant), 1 mg, Intramuscular, PRN    glucose, 16 g, Oral, PRN    glucose, 24 g, Oral, PRN    HYDROmorphone, 2 mg, Oral, Q6H PRN    LORazepam, 2 mg, Oral, Q4H PRN    naloxone, 0.02 mg, Intravenous, PRN    sodium chloride 0.9%, 10 mL, Intravenous, Q12H PRN  Anticoagulants/Antiplatelets: no anticoagulation    Allergies:   Review of patient's allergies indicates:   Allergen Reactions    Sulfa (sulfonamide antibiotics) Hives     Sedation Hx: have not been any systemic reactions    Vitals:  Temp: 98 °F (36.7 °C) (04/25/24 0728)  Pulse: 76 (04/25/24 0728)  Resp: 18 (04/25/24 0950)  BP: (!) 92/45 (04/25/24 0728)  SpO2:  (!) 9 % (04/25/24 0728)     Physical Exam:  ASA: 3  Mallampati: n/a    General: no acute distress, ill appearing, jaundiced  Mental Status: alert and oriented to person, place and time  HEENT: normocephalic, atraumatic  Chest: unlabored breathing  Heart: regular heart rate  Abdomen: distended  Extremity: moves all extremities    Plan: ultrasound guided paracentesis  Sedation Plan: local    Binta Billingsley PA-C  Interventional Radiology   Spectra: [phone]

## 2024-04-25 NOTE — ASSESSMENT & PLAN NOTE
[place] [facility]. Has a history of alcohol use disorder. Physical exam remarkable for right CVA tenderness, jaundice, sclera icterus. Reports having melena on her stool. Hgb 6.9 and plts 42.     CTA negative for any intraabdominal bleeding.  Cirrhotic configuration liver with stigmata of portal venous hypertension and moderate volume abdominopelvic ascites. Relatively diffuse colonic wall thickening with submucosal edema could relate to portal colopathy. Unable to perform EGD due to respiratory status.     Plan  - GI consulted, appreciate recs  - Trend Hgb q6hrs. Transfuse for Hgb <7, unless otherwise indicated  - Maintain IV access with 2 large bore Ivs  - Intravascular resuscitation/support with IVFs   - Hold all NSAIDs and anticoagulants, unless contraindicated  - Bolus IV pantoprazole 80mg followed by 40mg BID  - Continue Octreotide and CTX  - Please correct any coagulopathy with platelets and FFP for goal of platelets >50K and INR <2.0  - Please notify GI team if there is significant change in patient's clinical status  - EGD cancelled due to respiratory status  - No further reports of bleeding

## 2024-04-25 NOTE — ASSESSMENT & PLAN NOTE
Pt with a hx of alcohol use disorder presenting from OSH for complains of jaundice. She drinks 48 oz of vodka daily, however has been trying to taper down. Has a history of alcohol withdrawal in the past, but no seizures. As reported per transfer note lab work at OSH was remarkable for  Hb/Hct 7.4/21.0, INR 2.57, Na 120, K 2.5, Mg 0.7. Tbili of 24. . Hep studies negative. CT A/P shows cirrhosis and portal HTN, gastric diffuse bowel wall thickening and jose-pancreatic fat stranding. Physical exam remarkable for sclera icterus, jaundice, asterixis and abdominal distention. US abdomen did not showed a viable pocket for paracentesis.   - US liver showing ascites, Reversal of flow of the main portal, right portal, and left portal veins, which can be seen with liver disease. Hepatomegaly with heterogeneous echotexture.   - CTA negative for any intraabdominal bleeding.  Cirrhotic configuration liver with stigmata of portal venous hypertension and moderate volume abdominopelvic ascites. Relatively diffuse colonic wall thickening with submucosal edema could relate to portal colopathy.  - Hepatology consulted. Not a candidate for liver transplant at this time given active alcohol use    Plan  - Continue CTX for SBP PPX (5-7 day course)  - PPI for stress ppx  - Octreotide discontinued, monitoring for bleed  - F/u PeTH, anti sm muscle, anti mitochondrial abs   - Continue lactulose for goal 3-4 BMs per day   - Paracentesis ordered for SBP eval  - Addiction psych consulted  - Not a candidate for liver transplant  - Amenable to palliative   - Goal: stable, compensated, see palliative in [place]

## 2024-04-25 NOTE — PROGRESS NOTES
No fluid obtainable fpr paracentesis / pt to return to floor / pt to CT then to room per CT Tech / pt placed in transport to room / multi attempts to contact transport and line not working / will infor transport tech upon arrival pt is to go to 2nd floor CT

## 2024-04-25 NOTE — ASSESSMENT & PLAN NOTE
Patient's anemia is currently uncontrolled. Has not received any PRBCs to date. Etiology likely d/t chronic disease due to Chronic liver disease  Current CBC reviewed-   Lab Results   Component Value Date    HGB 7.8 (L) 04/25/2024    HCT 23.0 (L) 04/25/2024     - s/p 2 PRBC  - Type and screen   - Transfusing one unit of blood  - Monitor serial CBC and transfuse if patient becomes hemodynamically unstable, symptomatic or H/H drops below 7/21.  - No further reports of bleeding, EGD discontinued

## 2024-04-25 NOTE — PLAN OF CARE
Problem: Adult Inpatient Plan of Care  Goal: Plan of Care Review  Outcome: Progressing  Goal: Patient-Specific Goal (Individualized)  Outcome: Progressing  Goal: Absence of Hospital-Acquired Illness or Injury  Outcome: Progressing  Goal: Optimal Comfort and Wellbeing  Outcome: Progressing  Goal: Readiness for Transition of Care  Outcome: Progressing   Pt rested well. VS WNL. No ss of distress. PRN meds given as requested. Bed in low position. Call light within reach. Will continue POC.

## 2024-04-25 NOTE — PLAN OF CARE
Problem: Adult Inpatient Plan of Care  Goal: Plan of Care Review  Outcome: Adequate for Care Transition  Goal: Patient-Specific Goal (Individualized)  Outcome: Adequate for Care Transition  Goal: Absence of Hospital-Acquired Illness or Injury  Outcome: Adequate for Care Transition  Goal: Optimal Comfort and Wellbeing  Outcome: Adequate for Care Transition  Goal: Readiness for Transition of Care  Outcome: Adequate for Care Transition     Problem: Fluid and Electrolyte Imbalance (Acute Kidney Injury/Impairment)  Goal: Fluid and Electrolyte Balance  Outcome: Adequate for Care Transition     Problem: Oral Intake Inadequate (Acute Kidney Injury/Impairment)  Goal: Optimal Nutrition Intake  Outcome: Adequate for Care Transition     Problem: Renal Function Impairment (Acute Kidney Injury/Impairment)  Goal: Effective Renal Function  Outcome: Adequate for Care Transition     Problem: Skin Injury Risk Increased  Goal: Skin Health and Integrity  Outcome: Adequate for Care Transition

## 2024-04-25 NOTE — ASSESSMENT & PLAN NOTE
Patient has hyponatremia which is uncontrolled,We will aim to correct the sodium by 4-6mEq in 24 hours. We will monitor sodium Daily. The hyponatremia is due to Cirrhosis. We will obtain the following studies: Urine sodium, urine osmolality, serum osmolality. We will treat the hyponatremia with Fluid restriction of:  1.5 liter per day. The patient's sodium results have been reviewed and are listed below.  Recent Labs   Lab 04/25/24  0017   *     Improving with fluid restriction and low sodium diet.   Midodrine, octreotide, albumin infusion   - F/u nephrology recommendations  - D5 given 4/24 to prevent overcorrection   - Continue D5 for hyponatremia (goal Na 130)

## 2024-04-25 NOTE — PLAN OF CARE
Problem: Adult Inpatient Plan of Care  Goal: Plan of Care Review  4/25/2024 1730 by Kiera Bingham LPN  Outcome: Progressing  4/25/2024 1729 by Kiera Bingham LPN  Outcome: Adequate for Care Transition  Goal: Patient-Specific Goal (Individualized)  4/25/2024 1730 by Kiera Bingham LPN  Outcome: Progressing  4/25/2024 1729 by Kiera Bingham LPN  Outcome: Adequate for Care Transition  Goal: Absence of Hospital-Acquired Illness or Injury  4/25/2024 1730 by Kiera Bingham LPN  Outcome: Progressing  4/25/2024 1729 by Kiera Bingham LPN  Outcome: Adequate for Care Transition  Goal: Optimal Comfort and Wellbeing  4/25/2024 1730 by Kiera Bingham LPN  Outcome: Progressing  4/25/2024 1729 by Kiera Bingham LPN  Outcome: Adequate for Care Transition  Goal: Readiness for Transition of Care  4/25/2024 1730 by Kiera Bingham LPN  Outcome: Progressing  4/25/2024 1729 by iKera Bingham LPN  Outcome: Adequate for Care Transition     Problem: Fluid and Electrolyte Imbalance (Acute Kidney Injury/Impairment)  Goal: Fluid and Electrolyte Balance  4/25/2024 1730 by Kiera Bingham LPN  Outcome: Progressing  4/25/2024 1729 by Kiera Bingham LPN  Outcome: Adequate for Care Transition     Problem: Oral Intake Inadequate (Acute Kidney Injury/Impairment)  Goal: Optimal Nutrition Intake  4/25/2024 1730 by Kiera Bingham LPN  Outcome: Progressing  4/25/2024 1729 by Kiera Bingham LPN  Outcome: Adequate for Care Transition     Problem: Renal Function Impairment (Acute Kidney Injury/Impairment)  Goal: Effective Renal Function  4/25/2024 1730 by Kiera Bingham LPN  Outcome: Progressing  4/25/2024 1729 by Kiera Bingham LPN  Outcome: Adequate for Care Transition     Problem: Skin Injury Risk Increased  Goal: Skin Health and Integrity  4/25/2024 1730 by Kiera Bingham LPN  Outcome: Progressing  4/25/2024 1729 by Kiera Bingham LPN  Outcome: Adequate for Care Transition

## 2024-04-25 NOTE — ASSESSMENT & PLAN NOTE
Pt presenting from OSH for concerns of liver failure. Pt reports dysuria and urinary frequency. UA at OSH remarkable for nitrite positive. Given IV CTX.    Plan  - UA and urine culture  - continue rocephin   RESOLVED

## 2024-04-25 NOTE — ASSESSMENT & PLAN NOTE
Hyponatremia    38 year old female with history of alcohol use disorder presenting initially to OSH with jaundice found to be in acute liver failure tranferred to [facility] [place] for liver transplant evaluation. Complications include acute cystitis as well as concern for upper GI bleed. Nephrology consulted for up-trending creatinine and hyponatremia. In terms of her PB the patient's Cr was at presumed baseline of 0.7 on 4/20 and has up-trended while admitted to 1.5 on 4/22. Given timing of Cr elevation concern for offending agent given while admitted. She was on vancomycin with a supratherapeutic vanc trough on 4/22 (vanc since discontinued). Additionally, she received IV contrast w/ CTA on 4/20. No other nephrotoxic medications identified. Additional etiologies include pre-renal (as evidenced by urine Na < 10). Her MAPs have been > 65 since admission, do not suspect ischemic injury however she may have low effective circulatory volume if HRS is present. Bilirubin induced nephropathy also on differential.    In terms of hyponatremia Na improving to 132 on 4/23. Suspect secondary to liver dysfunction. Would recommend 1L fluid restriction.    4/23: Cr improving to 1.0. Suspect secondary to multifactorial insult from vancomycin and contrast load. Low suspicion for HRS and patient unable to complete albumin infusion trial due to hypervolemia.  Urine sediment 4/23 with few granular casts indicating some component of ATN.  4/25: Cr stable at 1.1 this morning. Suspect gradual improvement from ATN secondary to contrast on admission.    -- Recommend spot diuresis per primary, goal net negative 500cc  -- Trend Cr daily  -- Avoid nephrotoxic medications and IV contrast  -- Fluid restriction of 1L daily for hyponatremia  -- Trend Na daily

## 2024-04-25 NOTE — PROGRESS NOTES
[facility] - Telemetry Stepdown  Nephrology  Progress Note    Patient Name: Carola Tovar  MRN: [id number]  Admission Date: 4/20/2024  Hospital Length of Stay: 5 days  Attending Provider: Theresa Alvarez MD   Primary Care Physician: Roro, Primary Doctor  Principal Problem:Decompensated hepatic cirrhosis    Subjective:     HPI: The patient is a 38 year old female with a history of alcohol use disorder who presented to an OSH in [place] with jaundice and scleral icterus. She consumed about 48 oz of vodka daily, decreased to 16 oz per day. Her last drink was 4/19/2024. At the OSH she was found to be in acute liver failure. She was transferred to [facility] [place] for evaluation for liver transplant. She was admitted to hospital medicine. Her UA was concerning for 1+ protein, 3+ blood, 2+ bilirubin, > 100 RBC, 78 WBC. She was started on Vancomycin + ceftriaxone and found to have an elevated vanc trough on 4/22 to 30.3. Additionally she is being evaluated by GI given concern for upper GI bleed due to reported melena. CTA showing: Geographic areas of hypoattenuation involving the left greater than right kidneys on nonenhanced/precontrast series, nonspecific. Findings are nonspecific and of uncertain etiology, could reflect sequela of concentrated urine or metabolic derangement. Nephrology was consulted 4/22 due to up-trending creatinine and persistent hyponatremia. Concern for HRS vs alternative etiology of PB.    The patient reports recent NSAID use. She states that she had dysuria and urinary frequency prior to admission but clarifies that the volume of her urine also decreased during the days prior to admission. The urine became dark in color as well prior to admit.    Interval History: No acute events. O2 requirements down-trending. Cr stable at 1.1 this morning.    Review of patient's allergies indicates:   Allergen Reactions    Sulfa (sulfonamide antibiotics) Hives     Current Facility-Administered Medications   Medication Dose  Route Frequency Provider Last Rate Last Admin    azithromycin tablet 500 mg  500 mg Oral Daily Sakina Delong DO        benzonatate capsule 200 mg  200 mg Oral TID PRN Theresa Alvarez MD   200 mg at 04/25/24 0950    dextrose 10% bolus 125 mL 125 mL  12.5 g Intravenous PRN Kobi Villarreal MD        dextrose 10% bolus 250 mL 250 mL  25 g Intravenous PRN Kobi Villarreal MD        folic acid tablet 1 mg  1 mg Oral Daily Kobi Villarreal MD   1 mg at 04/25/24 0950    glucagon (human recombinant) injection 1 mg  1 mg Intramuscular PRN Kobi Villarreal MD        glucose chewable tablet 16 g  16 g Oral PRN Kobi Villarreal MD        glucose chewable tablet 24 g  24 g Oral PRN Kobi Villarreal MD        HYDROmorphone tablet 2 mg  2 mg Oral Q6H PRN Theresa Alvarez MD   2 mg at 04/25/24 0950    lactulose 20 gram/30 mL solution Soln 15 g  15 g Oral TID Yessica Garcia DO   15 g at 04/25/24 0950    LORazepam tablet 2 mg  2 mg Oral Q4H PRN Yessica Garcia DO        midodrine tablet 10 mg  10 mg Oral TID Theresa Alvarez MD   10 mg at 04/25/24 0950    multivitamin tablet  1 tablet Oral Daily Kobi Villarreal MD   1 tablet at 04/25/24 0950    naloxone 0.4 mg/mL injection 0.02 mg  0.02 mg Intravenous PRN Kobi Villarreal MD        nicotine 14 mg/24 hr 1 patch  1 patch Transdermal Daily Sakina Delong DO   1 patch at 04/25/24 0951    pantoprazole injection 40 mg  40 mg Intravenous Daily Sakina Delong DO   40 mg at 04/25/24 1003    sodium chloride 0.9% flush 10 mL  10 mL Intravenous Q12H PRN Kobi Villarreal MD        thiamine tablet 100 mg  100 mg Oral Daily Theresa Alvarez MD   100 mg at 04/25/24 0950       Objective:     Vital Signs (Most Recent):  Temp: 97.8 °F (36.6 °C) (04/25/24 1123)  Pulse: 81 (04/25/24 1123)  Resp: 20 (04/25/24 1123)  BP: (!) 106/54 (04/25/24 1123)  SpO2:  98 % (04/25/24 1123) Vital Signs (24h Range):  Temp:  [97.8 °F (36.6 °C)-98.9 °F (37.2 °C)] 97.8 °F (36.6 °C)  Pulse:  [69-92] 81  Resp:  [18-20] 20  SpO2:  [9 %-100 %] 98 %  BP: ()/(45-62) 106/54     Weight: 71.7 kg (158 lb) (04/22/24 0815)  Body mass index is 25.5 kg/m².  Body surface area is 1.83 meters squared.    No intake/output data recorded.     Physical Exam  Vitals reviewed.   Constitutional:       General: She is not in acute distress.  HENT:      Head: Normocephalic and atraumatic.   Eyes:      General: Scleral icterus present.      Extraocular Movements: Extraocular movements intact.      Pupils: Pupils are equal, round, and reactive to light.   Cardiovascular:      Rate and Rhythm: Normal rate and regular rhythm.      Heart sounds: No murmur heard.  Pulmonary:      Effort: Pulmonary effort is normal. No respiratory distress.      Breath sounds: No wheezing or rales.      Comments: Breathing comfortably on nasal canula  Abdominal:      General: Abdomen is flat. There is distension.      Palpations: Abdomen is soft.      Tenderness: There is no abdominal tenderness.   Musculoskeletal:         General: No swelling or tenderness. Normal range of motion.      Cervical back: Normal range of motion.      Right lower leg: No edema.      Left lower leg: No edema.   Skin:     General: Skin is warm and dry.      Coloration: Skin is jaundiced.      Findings: Bruising present.      Comments: Telangiectasias on chest   Neurological:      Mental Status: She is alert and oriented to person, place, and time. Mental status is at baseline.       Significant Labs:  CMP:   Recent Labs   Lab 04/24/24  0253 04/24/24  1617 04/25/24  0825   *   < > 133*   CALCIUM 7.8*   < > 7.7*   ALBUMIN 2.2*  --   --    PROT 4.8*  --   --    *   < > 133*   K 3.7   < > 3.3*   CO2 27   < > 28   CL 95   < > 97   BUN 13   < > 12   CREATININE 1.1   < > 1.1   ALKPHOS 123  --   --    ALT 35  --   --    AST 87*  --   --    BILITOT  17.7*  --   --     < > = values in this interval not displayed.     All labs within the past 24 hours have been reviewed.   Assessment/Plan:     Renal/  PB (acute kidney injury)  Hyponatremia    38 year old female with history of alcohol use disorder presenting initially to OSH with jaundice found to be in acute liver failure tranferred to [facility] [place] for liver transplant evaluation. Complications include acute cystitis as well as concern for upper GI bleed. Nephrology consulted for up-trending creatinine and hyponatremia. In terms of her PB the patient's Cr was at presumed baseline of 0.7 on 4/20 and has up-trended while admitted to 1.5 on 4/22. Given timing of Cr elevation concern for offending agent given while admitted. She was on vancomycin with a supratherapeutic vanc trough on 4/22 (vanc since discontinued). Additionally, she received IV contrast w/ CTA on 4/20. No other nephrotoxic medications identified. Additional etiologies include pre-renal (as evidenced by urine Na < 10). Her MAPs have been > 65 since admission, do not suspect ischemic injury however she may have low effective circulatory volume if HRS is present. Bilirubin induced nephropathy also on differential.    In terms of hyponatremia Na improving to 132 on 4/23. Suspect secondary to liver dysfunction. Would recommend 1L fluid restriction.    4/23: Cr improving to 1.0. Suspect secondary to multifactorial insult from vancomycin and contrast load. Low suspicion for HRS and patient unable to complete albumin infusion trial due to hypervolemia.  Urine sediment 4/23 with few granular casts indicating some component of ATN.  4/25: Cr stable at 1.1 this morning. Suspect gradual improvement from ATN secondary to contrast on admission.    -- Recommend spot diuresis per primary, goal net negative 500cc  -- Trend Cr daily  -- Avoid nephrotoxic medications and IV contrast  -- Fluid restriction of 1L daily for hyponatremia  -- Trend Na daily      Thank you for  your consult. I will sign off. Please contact us if you have any additional questions.    Jose Eduardo Olsen MD  Nephrology  [facility] - Telemetry Stepdown

## 2024-04-25 NOTE — PROGRESS NOTES
[facility] - Telemetry [facility] Medicine  Progress Note    Patient Name: Carola Tovar  MRN: [id number]  Patient Class: IP- Inpatient   Admission Date: 4/20/2024  Length of Stay: 5 days  Attending Physician: Theresa Alvarez MD  Primary Care Provider: Roro, Primary Doctor        Subjective:     Principal Problem:Decompensated hepatic cirrhosis        HPI:  Ms. Tovar is a 39 yo F with alcohol abuse disorder who presented to OSH ED with jaundice. Pt is originally from [place], but have spent the last 1.5 months in [place] visiting a friend. For the past month she has started noticing yellow discoloration in her eyes and skin. Pt reports that she used to drink 48 oz of vodka daily, but for the past week she has taper down to only 16 oz per day. Last drink was yesterday 04/19. She also reports multiple episodes of withdrawal in the past, but no seizure episodes. She also report dysuria and increased urinary frequency. Denies headaches, visual changes, SOB, cough, chest pain, palpitation, nausea, vomiting, abdominal pain, diarrhea, constipation.     At the OSH ED the patient was afebrile tachycardic to 120 with /42. Lab work remarkable for Hb/Hct 7.4/21.0, INR 2.57, Na 120, K 2.5, Mg 0.7. Tbili of 24. U/A nitrite positive. Given IV CTX, Mg and K replacements. Hep studies negative. CT A/P shows cirrhosis and portal HTN, gastric diffuse bowel wall thickening and jose-pancreatic fat stranding. Pt was transferred to [facility] [place] for higher level of care. Pt admitted to hospital medicine for further management of acute on chronic liver failure and hepatology was consulted.     Overview/Hospital Course:  Pt admitted to hospital medicine for decompensated hepatic cirrhosis 2/2 to alcohol use complicated by HE and melena. MELD was 31 during admission. GI and Hepatology consulted. Hgb 6.9 pt received 2 units of PRBC. Plts low and elevated INR and PT, cryo, plts and vit k given. Low calcium, calcium gluconate given. US  liver showing ascites, Reversal of flow of the main portal, right portal, and left portal veins, which can be seen with liver disease. Hepatomegaly with heterogeneous echotexture. CTA negative for any intraabdominal bleeding.  Cirrhotic configuration liver with stigmata of portal venous hypertension and moderate volume abdominopelvic ascites. Relatively diffuse colonic wall thickening with submucosal edema could relate to portal colopathy. Sodium trending down to 118, pt AOX4. Critical care consulted, pt stable to continue under hospital medicine services. Initiated on octreotide, midodrine, and albumin. Hepatology consulted for transplant evaluation, deemed not a candidate. Treated with prn ativan for alcohol withdrawal. Developed worsening PB concerning for HRS, nephrology consulted. Patient did not tolerate albumin challenge which made HRS less likely. Patient increased oxygen requirements night to 30L HFNC from 2L NC. MICU recommended continued diuresis. Oxygen requirements improved with spot diuresis Lasix 40 x2. Hepatology did not think she was a candidate for liver transplant due to her active alcohol use despite knowing of her liver disease. Her EGD was delayed on 4/23 due to worsening oxygen requirements overnight. She has not had further bleeding episodes. Azithromycin added for atypical PNA 4/25 due to cough that started before her admission. RIP was negative. Her O2 requirements have improved. She is getting a paracentesis 4/25 for SBP evaluation. Patient was agreeable to follow with palliative in [place]. Her support system includes her boyfriend and 4 children. She will likely need hospice in the future.       Interval History: No fevers overnight. Abdomen is uncomfortable from the ascites but not tender. Paracentesis today for SBP evaluation. She was on 5 d CTX for SBP ppx that ended this AM. Vitals stable. RIP negative. O2 requirements improved this morning (2L this AM) and can hopefully come  off oxygen soon. Azithro added for lingering cough. CT renal stone added for R flank pain.         Review of Systems  Objective:     Vital Signs (Most Recent):  Temp: 98 °F (36.7 °C) (04/25/24 0728)  Pulse: 76 (04/25/24 0728)  Resp: 20 (04/25/24 0728)  BP: (!) 92/45 (04/25/24 0728)  SpO2: (!) 9 % (04/25/24 0728) Vital Signs (24h Range):  Temp:  [97.9 °F (36.6 °C)-100.7 °F (38.2 °C)] 98 °F (36.7 °C)  Pulse:  [69-99] 76  Resp:  [18-20] 20  SpO2:  [9 %-100 %] 9 %  BP: ()/(45-62) 92/45     Weight: 71.7 kg (158 lb)  Body mass index is 25.5 kg/m².  No intake or output data in the 24 hours ending 04/25/24 0837      Physical Exam  Constitutional:       General: She is not in acute distress.     Appearance: She is ill-appearing.   HENT:      Head: Normocephalic and atraumatic.   Eyes:      General: Scleral icterus present.      Extraocular Movements: Extraocular movements intact.   Cardiovascular:      Rate and Rhythm: Normal rate and regular rhythm.   Pulmonary:      Effort: No respiratory distress.      Breath sounds: No wheezing.      Comments: Crackles on LLL , on 2 L NC  Abdominal:      General: There is distension.      Palpations: Abdomen is soft.      Tenderness: There is no abdominal tenderness. There is no guarding or rebound.      Comments: No asterixis   Skin:     Coloration: Skin is jaundiced.   Neurological:      Mental Status: She is alert and oriented to person, place, and time.        Significant Labs: All pertinent labs within the past 24 hours have been reviewed.    Significant Imaging: I have reviewed all pertinent imaging results/findings within the past 24 hours.     Assessment/Plan:      * Decompensated hepatic cirrhosis  Pt with a hx of alcohol use disorder presenting from OSH for complains of jaundice. She drinks 48 oz of vodka daily, however has been trying to taper down. Has a history of alcohol withdrawal in the past, but no seizures. As reported per transfer note lab work at OSH was  remarkable for  Hb/Hct 7.4/21.0, INR 2.57, Na 120, K 2.5, Mg 0.7. Tbili of 24. . Hep studies negative. CT A/P shows cirrhosis and portal HTN, gastric diffuse bowel wall thickening and jose-pancreatic fat stranding. Physical exam remarkable for sclera icterus, jaundice, asterixis and abdominal distention. US abdomen did not showed a viable pocket for paracentesis.   - US liver showing ascites, Reversal of flow of the main portal, right portal, and left portal veins, which can be seen with liver disease. Hepatomegaly with heterogeneous echotexture.   - CTA negative for any intraabdominal bleeding.  Cirrhotic configuration liver with stigmata of portal venous hypertension and moderate volume abdominopelvic ascites. Relatively diffuse colonic wall thickening with submucosal edema could relate to portal colopathy.  - Hepatology consulted. Not a candidate for liver transplant at this time given active alcohol use    Plan  - Continue CTX for SBP PPX (5-7 day course)  - PPI for stress ppx  - Octreotide discontinued, monitoring for bleed  - F/u PeTH, anti sm muscle, anti mitochondrial abs   - Continue lactulose for goal 3-4 BMs per day   - Paracentesis ordered for SBP eval  - Addiction psych consulted  - Not a candidate for liver transplant  - Amenable to palliative   - Goal: stable, compensated, see palliative in [place]       Acute hypoxemic respiratory failure  Acute respiratory status due to pleural effusion seen on CXR and URI (coughing before admission).    -RIP negative  -Spot diuresis as needed  -O2 requirements improving        Alcohol withdrawal, uncomplicated  Resolved      UGIB (upper gastrointestinal bleed)  Pt [place] [facility] [place] chronic liver failure. Has a history of alcohol use disorder. Physical exam remarkable for right CVA tenderness, jaundice, sclera icterus. Reports having melena on her stool. Hgb 6.9 and plts 42.     CTA negative for any intraabdominal bleeding.  Cirrhotic configuration  liver with stigmata of portal venous hypertension and moderate volume abdominopelvic ascites. Relatively diffuse colonic wall thickening with submucosal edema could relate to portal colopathy. Unable to perform EGD due to respiratory status.     Plan  - GI consulted, appreciate recs  - Trend Hgb q6hrs. Transfuse for Hgb <7, unless otherwise indicated  - Maintain IV access with 2 large bore Ivs  - Intravascular resuscitation/support with IVFs   - Hold all NSAIDs and anticoagulants, unless contraindicated  - Bolus IV pantoprazole 80mg followed by 40mg BID  - Continue Octreotide and CTX  - Please correct any coagulopathy with platelets and FFP for goal of platelets >50K and INR <2.0  - Please notify GI team if there is significant change in patient's clinical status  - EGD cancelled due to respiratory status  - No further reports of bleeding      Acute cystitis  Pt presenting from OSH for concerns of liver failure. Pt reports dysuria and urinary frequency. UA at OSH remarkable for nitrite positive. Given IV CTX.    Plan  - UA and urine culture  - continue rocephin   RESOLVED      Acute blood loss anemia  Patient's anemia is currently uncontrolled. Has not received any PRBCs to date. Etiology likely d/t chronic disease due to Chronic liver disease  Current CBC reviewed-   Lab Results   Component Value Date    HGB 7.8 (L) 04/25/2024    HCT 23.0 (L) 04/25/2024     - s/p 2 PRBC  - Type and screen   - Transfusing one unit of blood  - Monitor serial CBC and transfuse if patient becomes hemodynamically unstable, symptomatic or H/H drops below 7/21.  - No further reports of bleeding, EGD discontinued      Alcoholic cirrhosis  Patient with known Cirrhosis with Child's class C. Co-morbidities are present and inclusive of portal hypertension and anemia/pancytopenia.  MELD-Na score calculated; MELD 3.0: 30 at 4/25/2024  8:25 AM  MELD-Na: 27 at 4/25/2024  8:25 AM  Calculated from:  Serum Creatinine: 1.1 mg/dL at 4/25/2024  8:25  AM  Serum Sodium: 133 mmol/L at 4/25/2024  8:25 AM  Total Bilirubin: 17.7 mg/dL at 4/24/2024  2:53 AM  Serum Albumin: 2.2 g/dL at 4/24/2024  2:53 AM  INR(ratio): 1.9 at 4/25/2024  6:20 AM  Age at listing (hypothetical): 38 years  Sex: Female at 4/25/2024  8:25 AM      Continue chronic meds. Etiology likely ETOH. Will avoid any hepatotoxic meds, and monitor CBC/CMP/INR for synthetic function.     For plan please see decompensated cirrhosis    Coagulopathy  Pt with hx of alcohol use disorder and recently diagnosed alcohol liver cirrhosis. Lab work remarkable for elevated INR and Plts 42, PT 25.8 INR 2.5 Fibrinogen 105.  - S/p 2 units prbc, 1 unit plts, and 1 unit cryo on admission    Plan  - Plts goal > 50K  - CBC q6 hrs  - Daily PT/INR    Hyponatremia  Patient has hyponatremia which is uncontrolled,We will aim to correct the sodium by 4-6mEq in 24 hours. We will monitor sodium Daily. The hyponatremia is due to Cirrhosis. We will obtain the following studies: Urine sodium, urine osmolality, serum osmolality. We will treat the hyponatremia with Fluid restriction of:  1.5 liter per day. The patient's sodium results have been reviewed and are listed below.  Recent Labs   Lab 04/25/24  0017   *     Improving with fluid restriction and low sodium diet.   Midodrine, octreotide, albumin infusion   - F/u nephrology recommendations  - D5 given 4/24 to prevent overcorrection   - Continue D5 for hyponatremia (goal Na 130)    Alcohol use disorder  Pt with hx of alcohol use disorder presenting to [facility] [place] with acute on chronic liver failure. Reports last drink was on 04/19/24. Also has a hx of alcohol withdrawal with no seizure activity.     Alcohol Withdrawal precautions:  - Vitals q4h while awake  - CIWA monitoring  - Lorazepam PRN if 2 criteria are met: Systolic BP>160, Diastolic BP >110 or Pulse >110  - Start Vitamin supplementation- Thiamine, Folic acid, Vit. B12, and Multivitamin qd  RESOLVED        VTE Risk Mitigation  (From admission, onward)           Ordered     IP VTE LOW RISK PATIENT  Once         04/20/24 1347     Place sequential compression device  Until discontinued         04/20/24 1347                    Discharge Planning   KARSON: 4/30/2024     Code Status: Full Code   Is the patient medically ready for discharge?:     Reason for patient still in hospital (select all that apply): Patient trending condition  Discharge Plan A: Home with family                  Sakina Delong DO  Department of Hospital Medicine   [facility] - Telemetry Stepdown

## 2024-04-25 NOTE — ASSESSMENT & PLAN NOTE
Pt with a hx of alcohol use disorder presenting from OSH for complains of jaundice. She drinks 48 oz of vodka daily, however has been trying to taper down. Has a history of alcohol withdrawal in the past, but no seizures. As reported per transfer note lab work at OSH was remarkable for  Hb/Hct 7.4/21.0, INR 2.57, Na 120, K 2.5, Mg 0.7. Tbili of 24. . Hep studies negative. CT A/P shows cirrhosis and portal HTN, gastric diffuse bowel wall thickening and jose-pancreatic fat stranding. Physical exam remarkable for sclera icterus, jaundice, asterixis and abdominal distention. US abdomen did not showed a viable pocket for paracentesis.   - US liver showing ascites, Reversal of flow of the main portal, right portal, and left portal veins, which can be seen with liver disease. Hepatomegaly with heterogeneous echotexture.   - CTA negative for any intraabdominal bleeding.  Cirrhotic configuration liver with stigmata of portal venous hypertension and moderate volume abdominopelvic ascites. Relatively diffuse colonic wall thickening with submucosal edema could relate to portal colopathy.  - Hepatology consulted. Not a candidate for liver transplant at this time given active alcohol use    Plan  - Continue CTX for SBP PPX (5-7 day course)  - PPI for stress ppx  - Octreotide discontinued, monitoring for bleed  - F/u PeTH, anti sm muscle, anti mitochondrial abs   - Continue lactulose for goal 3-4 BMs per day   - Unable to find pocket for paracentesis   - Addiction psych consulted  - Not a candidate for liver transplant  - Amenable to palliative   - Goal: stable, compensated, see palliative in [place]

## 2024-04-25 NOTE — H&P
Inpatient Radiology Pre-procedure Note    History of Present Illness:  Carola Tovar is a 38 y.o. female who presents for ultrasound guided paracentesis.  Admission H&P reviewed.  No past medical history on file.  Past Surgical History:   Procedure Laterality Date    ESOPHAGOGASTRODUODENOSCOPY N/A 4/23/2024    Procedure: EGD (ESOPHAGOGASTRODUODENOSCOPY);  Surgeon: Manuel Moore MD;  Location: [address];  Service: Endoscopy;  Laterality: N/A;       Review of Systems:   As documented in primary team H&P    Home Meds:   Prior to Admission medications    Not on File     Scheduled Meds:   Current Facility-Administered Medications   Medication Dose Route Frequency    azithromycin  500 mg Oral Daily    folic acid  1 mg Oral Daily    lactulose  15 g Oral TID    magnesium sulfate IVPB  2 g Intravenous Once    midodrine  10 mg Oral TID    multivitamin  1 tablet Oral Daily    nicotine  1 patch Transdermal Daily    pantoprazole  40 mg Intravenous Daily    thiamine  100 mg Oral Daily     Continuous Infusions:   Current Facility-Administered Medications   Medication Dose Route Frequency Last Rate Last Admin     PRN Meds:  Current Facility-Administered Medications:     benzonatate, 200 mg, Oral, TID PRN    dextrose 10%, 12.5 g, Intravenous, PRN    dextrose 10%, 25 g, Intravenous, PRN    glucagon (human recombinant), 1 mg, Intramuscular, PRN    glucose, 16 g, Oral, PRN    glucose, 24 g, Oral, PRN    HYDROmorphone, 2 mg, Oral, Q6H PRN    LORazepam, 2 mg, Oral, Q4H PRN    naloxone, 0.02 mg, Intravenous, PRN    sodium chloride 0.9%, 10 mL, Intravenous, Q12H PRN  Anticoagulants/Antiplatelets: no anticoagulation    Allergies:   Review of patient's allergies indicates:   Allergen Reactions    Sulfa (sulfonamide antibiotics) Hives     Sedation Hx: have not been any systemic reactions    Vitals:  Temp: 97.8 °F (36.6 °C) (04/25/24 1123)  Pulse: 81 (04/25/24 1123)  Resp: 20 (04/25/24 1123)  BP: (!) 106/54 (04/25/24  1123)  SpO2: 98 % (04/25/24 1123)     Physical Exam:  ASA: 3  Mallampati: n/a    General: no acute distress, jaundiced  Mental Status: alert and oriented to person, place and time  HEENT: normocephalic, atraumatic  Chest: unlabored breathing  Heart: regular heart rate  Abdomen: distended  Extremity: moves all extremities    Plan: ultrasound guided paracentesis  Sedation Plan: local    Binta Billingsley PA-C  Interventional Radiology   Spectra: [phone]

## 2024-04-25 NOTE — ASSESSMENT & PLAN NOTE
Patient with known Cirrhosis with Child's class C. Co-morbidities are present and inclusive of portal hypertension and anemia/pancytopenia.  MELD-Na score calculated; MELD 3.0: 31 at 4/25/2024  6:20 AM  MELD-Na: 29 at 4/25/2024  6:20 AM  Calculated from:  Serum Creatinine: 1.3 mg/dL at 4/25/2024 12:17 AM  Serum Sodium: 133 mmol/L at 4/25/2024 12:17 AM  Total Bilirubin: 17.7 mg/dL at 4/24/2024  2:53 AM  Serum Albumin: 2.2 g/dL at 4/24/2024  2:53 AM  INR(ratio): 1.9 at 4/25/2024  6:20 AM  Age at listing (hypothetical): 38 years  Sex: Female at 4/25/2024  6:20 AM      Continue chronic meds. Etiology likely ETOH. Will avoid any hepatotoxic meds, and monitor CBC/CMP/INR for synthetic function.     For plan please see liver failure

## 2024-04-25 NOTE — ASSESSMENT & PLAN NOTE
Pt with hx of alcohol use disorder presenting to [facility] with acute on chronic liver failure. Reports last drink was on 04/19/24. Also has a hx of alcohol withdrawal with no seizure activity.     Alcohol Withdrawal precautions:  - Vitals q4h while awake  - CIWA monitoring  - Lorazepam PRN if 2 criteria are met: Systolic BP>160, Diastolic BP >110 or Pulse >110  - Start Vitamin supplementation- Thiamine, Folic acid, Vit. B12, and Multivitamin qd  RESOLVED

## 2024-04-25 NOTE — SUBJECTIVE & OBJECTIVE
Interval History: No fevers overnight. Abdomen is uncomfortable from the ascites but not more tender. RIP negative. Paracentesis today for SBP evaluation. She was on 5 d CTX for SBP ppx that ended this AM.     Review of Systems  Objective:     Vital Signs (Most Recent):  Temp: 98 °F (36.7 °C) (04/25/24 0728)  Pulse: 76 (04/25/24 0728)  Resp: 20 (04/25/24 0728)  BP: (!) 92/45 (04/25/24 0728)  SpO2: (!) 9 % (04/25/24 0728) Vital Signs (24h Range):  Temp:  [97.9 °F (36.6 °C)-100.7 °F (38.2 °C)] 98 °F (36.7 °C)  Pulse:  [69-99] 76  Resp:  [18-20] 20  SpO2:  [9 %-100 %] 9 %  BP: ()/(45-62) 92/45     Weight: 71.7 kg (158 lb)  Body mass index is 25.5 kg/m².  No intake or output data in the 24 hours ending 04/25/24 0837      Physical Exam  Constitutional:       General: She is not in acute distress.     Appearance: She is ill-appearing.   HENT:      Head: Normocephalic and atraumatic.   Eyes:      General: Scleral icterus present.      Extraocular Movements: Extraocular movements intact.   Cardiovascular:      Rate and Rhythm: Normal rate and regular rhythm.   Pulmonary:      Effort: No respiratory distress.      Breath sounds: No wheezing.      Comments: Crackles on LLL , on 2 L NC  Abdominal:      General: There is distension.      Palpations: Abdomen is soft.      Tenderness: There is no abdominal tenderness. There is no guarding or rebound.      Comments: No asterixis   Skin:     Coloration: Skin is jaundiced.   Neurological:      Mental Status: She is alert and oriented to person, place, and time.         Significant Labs: All pertinent labs within the past 24 hours have been reviewed.    Significant Imaging: I have reviewed all pertinent imaging results/findings within the past 24 hours.

## 2024-04-25 NOTE — SUBJECTIVE & OBJECTIVE
Interval History: No acute events. O2 requirements down-trending. Cr stable at 1.1 this morning.    Review of patient's allergies indicates:   Allergen Reactions    Sulfa (sulfonamide antibiotics) Hives     Current Facility-Administered Medications   Medication Dose Route Frequency Provider Last Rate Last Admin    azithromycin tablet 500 mg  500 mg Oral Daily Sakina Delong DO        benzonatate capsule 200 mg  200 mg Oral TID PRN Theresa Alvarez MD   200 mg at 04/25/24 0950    dextrose 10% bolus 125 mL 125 mL  12.5 g Intravenous PRN Kobi Villarreal MD        dextrose 10% bolus 250 mL 250 mL  25 g Intravenous PRN Kobi Villarreal MD        folic acid tablet 1 mg  1 mg Oral Daily Kobi Villarreal MD   1 mg at 04/25/24 0950    glucagon (human recombinant) injection 1 mg  1 mg Intramuscular PRN Kobi Villarreal MD        glucose chewable tablet 16 g  16 g Oral PRN Kobi Villarreal MD        glucose chewable tablet 24 g  24 g Oral PRN Kobi Villarreal MD        HYDROmorphone tablet 2 mg  2 mg Oral Q6H PRN Theresa Alvarez MD   2 mg at 04/25/24 0950    lactulose 20 gram/30 mL solution Soln 15 g  15 g Oral TID Yessica Garcia, DO   15 g at 04/25/24 0950    LORazepam tablet 2 mg  2 mg Oral Q4H PRN Yessica Garcia DO        midodrine tablet 10 mg  10 mg Oral TID Theresa Alvarez MD   10 mg at 04/25/24 0950    multivitamin tablet  1 tablet Oral Daily Kobi Villarreal MD   1 tablet at 04/25/24 0950    naloxone 0.4 mg/mL injection 0.02 mg  0.02 mg Intravenous PRN Kobi Villarreal MD        nicotine 14 mg/24 hr 1 patch  1 patch Transdermal Daily Sakina Delong DO   1 patch at 04/25/24 0951    pantoprazole injection 40 mg  40 mg Intravenous Daily Sakina Delong DO   40 mg at 04/25/24 1003    sodium chloride 0.9% flush 10 mL  10 mL Intravenous Q12H PRN Kobi Villarreal MD        thiamine  tablet 100 mg  100 mg Oral Daily Theresa Alvarez MD   100 mg at 04/25/24 0950       Objective:     Vital Signs (Most Recent):  Temp: 97.8 °F (36.6 °C) (04/25/24 1123)  Pulse: 81 (04/25/24 1123)  Resp: 20 (04/25/24 1123)  BP: (!) 106/54 (04/25/24 1123)  SpO2: 98 % (04/25/24 1123) Vital Signs (24h Range):  Temp:  [97.8 °F (36.6 °C)-98.9 °F (37.2 °C)] 97.8 °F (36.6 °C)  Pulse:  [69-92] 81  Resp:  [18-20] 20  SpO2:  [9 %-100 %] 98 %  BP: ()/(45-62) 106/54     Weight: 71.7 kg (158 lb) (04/22/24 0815)  Body mass index is 25.5 kg/m².  Body surface area is 1.83 meters squared.    No intake/output data recorded.     Physical Exam  Vitals reviewed.   Constitutional:       General: She is not in acute distress.  HENT:      Head: Normocephalic and atraumatic.   Eyes:      General: Scleral icterus present.      Extraocular Movements: Extraocular movements intact.      Pupils: Pupils are equal, round, and reactive to light.   Cardiovascular:      Rate and Rhythm: Normal rate and regular rhythm.      Heart sounds: No murmur heard.  Pulmonary:      Effort: Pulmonary effort is normal. No respiratory distress.      Breath sounds: No wheezing or rales.      Comments: Breathing comfortably on nasal canula  Abdominal:      General: Abdomen is flat. There is distension.      Palpations: Abdomen is soft.      Tenderness: There is no abdominal tenderness.   Musculoskeletal:         General: No swelling or tenderness. Normal range of motion.      Cervical back: Normal range of motion.      Right lower leg: No edema.      Left lower leg: No edema.   Skin:     General: Skin is warm and dry.      Coloration: Skin is jaundiced.      Findings: Bruising present.      Comments: Telangiectasias on chest   Neurological:      Mental Status: She is alert and oriented to person, place, and time. Mental status is at baseline.       Significant Labs:  CMP:   Recent Labs   Lab 04/24/24  0253 04/24/24  1617 04/25/24  0825   *   < > 133*    CALCIUM 7.8*   < > 7.7*   ALBUMIN 2.2*  --   --    PROT 4.8*  --   --    *   < > 133*   K 3.7   < > 3.3*   CO2 27   < > 28   CL 95   < > 97   BUN 13   < > 12   CREATININE 1.1   < > 1.1   ALKPHOS 123  --   --    ALT 35  --   --    AST 87*  --   --    BILITOT 17.7*  --   --     < > = values in this interval not displayed.     All labs within the past 24 hours have been reviewed.

## 2024-04-25 NOTE — ASSESSMENT & PLAN NOTE
Acute respiratory status due to pleural effusion seen on CXR and URI (coughing before admission).    -RIP negative  -Spot diuresis as needed  -O2 requirements improving

## 2024-04-25 NOTE — ASSESSMENT & PLAN NOTE
Patient with known Cirrhosis with Child's class C. Co-morbidities are present and inclusive of portal hypertension and anemia/pancytopenia.  MELD-Na score calculated; MELD 3.0: 30 at 4/25/2024  8:25 AM  MELD-Na: 27 at 4/25/2024  8:25 AM  Calculated from:  Serum Creatinine: 1.1 mg/dL at 4/25/2024  8:25 AM  Serum Sodium: 133 mmol/L at 4/25/2024  8:25 AM  Total Bilirubin: 17.7 mg/dL at 4/24/2024  2:53 AM  Serum Albumin: 2.2 g/dL at 4/24/2024  2:53 AM  INR(ratio): 1.9 at 4/25/2024  6:20 AM  Age at listing (hypothetical): 38 years  Sex: Female at 4/25/2024  8:25 AM      Continue chronic meds. Etiology likely ETOH. Will avoid any hepatotoxic meds, and monitor CBC/CMP/INR for synthetic function.     For plan please see decompensated cirrhosis

## 2024-04-26 LAB
ALBUMIN SERPL BCP-MCNC: 2.2 G/DL (ref 3.5–5.2)
ALP SERPL-CCNC: 135 U/L (ref 55–135)
ALT SERPL W/O P-5'-P-CCNC: 31 U/L (ref 10–44)
ANION GAP SERPL CALC-SCNC: 8 MMOL/L (ref 8–16)
ANION GAP SERPL CALC-SCNC: 9 MMOL/L (ref 8–16)
ANISOCYTOSIS BLD QL SMEAR: SLIGHT
AST SERPL-CCNC: 89 U/L (ref 10–40)
BASO STIPL BLD QL SMEAR: ABNORMAL
BASOPHILS # BLD AUTO: 0.09 K/UL (ref 0–0.2)
BASOPHILS NFR BLD: 1.1 % (ref 0–1.9)
BILIRUB DIRECT SERPL-MCNC: 9.6 MG/DL (ref 0.1–0.3)
BILIRUB SERPL-MCNC: 17.4 MG/DL (ref 0.1–1)
BUN SERPL-MCNC: 11 MG/DL (ref 6–20)
BUN SERPL-MCNC: 12 MG/DL (ref 6–20)
BURR CELLS BLD QL SMEAR: ABNORMAL
CALCIUM SERPL-MCNC: 8.2 MG/DL (ref 8.7–10.5)
CALCIUM SERPL-MCNC: 8.2 MG/DL (ref 8.7–10.5)
CHLORIDE SERPL-SCNC: 98 MMOL/L (ref 95–110)
CHLORIDE SERPL-SCNC: 98 MMOL/L (ref 95–110)
CLINICAL BIOCHEMIST REVIEW: NORMAL
CO2 SERPL-SCNC: 27 MMOL/L (ref 23–29)
CO2 SERPL-SCNC: 29 MMOL/L (ref 23–29)
CREAT SERPL-MCNC: 0.8 MG/DL (ref 0.5–1.4)
CREAT SERPL-MCNC: 0.8 MG/DL (ref 0.5–1.4)
DIFFERENTIAL METHOD BLD: ABNORMAL
DOHLE BOD BLD QL SMEAR: PRESENT
EOSINOPHIL # BLD AUTO: 0.2 K/UL (ref 0–0.5)
EOSINOPHIL NFR BLD: 2.1 % (ref 0–8)
ERYTHROCYTE [DISTWIDTH] IN BLOOD BY AUTOMATED COUNT: ABNORMAL % (ref 11.5–14.5)
EST. GFR  (NO RACE VARIABLE): >60 ML/MIN/1.73 M^2
EST. GFR  (NO RACE VARIABLE): >60 ML/MIN/1.73 M^2
GLUCOSE SERPL-MCNC: 102 MG/DL (ref 70–110)
GLUCOSE SERPL-MCNC: 90 MG/DL (ref 70–110)
HCT VFR BLD AUTO: 24.1 % (ref 37–48.5)
HGB BLD-MCNC: 8.1 G/DL (ref 12–16)
HYPOCHROMIA BLD QL SMEAR: ABNORMAL
IMM GRANULOCYTES # BLD AUTO: 0.15 K/UL (ref 0–0.04)
IMM GRANULOCYTES NFR BLD AUTO: 1.8 % (ref 0–0.5)
INR PPP: 1.9 (ref 0.8–1.2)
LYMPHOCYTES # BLD AUTO: 1 K/UL (ref 1–4.8)
LYMPHOCYTES NFR BLD: 11.8 % (ref 18–48)
MCH RBC QN AUTO: 37.5 PG (ref 27–31)
MCHC RBC AUTO-ENTMCNC: 33.6 G/DL (ref 32–36)
MCV RBC AUTO: 112 FL (ref 82–98)
MONOCYTES # BLD AUTO: 1.6 K/UL (ref 0.3–1)
MONOCYTES NFR BLD: 19.7 % (ref 4–15)
NEUTROPHILS # BLD AUTO: 5.3 K/UL (ref 1.8–7.7)
NEUTROPHILS NFR BLD: 63.5 % (ref 38–73)
NRBC BLD-RTO: 0 /100 WBC
OVALOCYTES BLD QL SMEAR: ABNORMAL
PLATELET # BLD AUTO: 39 K/UL (ref 150–450)
PLATELET BLD QL SMEAR: ABNORMAL
PLPETH BLD-MCNC: 97 NG/ML
PMV BLD AUTO: 10.3 FL (ref 9.2–12.9)
POIKILOCYTOSIS BLD QL SMEAR: SLIGHT
POLYCHROMASIA BLD QL SMEAR: ABNORMAL
POPETH BLD-MCNC: NORMAL NG/ML
POTASSIUM SERPL-SCNC: 4 MMOL/L (ref 3.5–5.1)
POTASSIUM SERPL-SCNC: 4.2 MMOL/L (ref 3.5–5.1)
PROT SERPL-MCNC: 4.9 G/DL (ref 6–8.4)
PROTHROMBIN TIME: 19.5 SEC (ref 9–12.5)
RBC # BLD AUTO: 2.16 M/UL (ref 4–5.4)
SCHISTOCYTES BLD QL SMEAR: ABNORMAL
SCHISTOCYTES BLD QL SMEAR: PRESENT
SODIUM SERPL-SCNC: 134 MMOL/L (ref 136–145)
SODIUM SERPL-SCNC: 135 MMOL/L (ref 136–145)
WBC # BLD AUTO: 8.29 K/UL (ref 3.9–12.7)
WBC TOXIC VACUOLES BLD QL SMEAR: PRESENT

## 2024-04-26 PROCEDURE — 25000003 PHARM REV CODE 250

## 2024-04-26 PROCEDURE — 97112 NEUROMUSCULAR REEDUCATION: CPT

## 2024-04-26 PROCEDURE — 85025 COMPLETE CBC W/AUTO DIFF WBC: CPT | Performed by: STUDENT IN AN ORGANIZED HEALTH CARE EDUCATION/TRAINING PROGRAM

## 2024-04-26 PROCEDURE — C9113 INJ PANTOPRAZOLE SODIUM, VIA: HCPCS

## 2024-04-26 PROCEDURE — 80048 BASIC METABOLIC PNL TOTAL CA: CPT | Mod: 91

## 2024-04-26 PROCEDURE — 25000003 PHARM REV CODE 250: Performed by: STUDENT IN AN ORGANIZED HEALTH CARE EDUCATION/TRAINING PROGRAM

## 2024-04-26 PROCEDURE — S4991 NICOTINE PATCH NONLEGEND: HCPCS

## 2024-04-26 PROCEDURE — 36415 COLL VENOUS BLD VENIPUNCTURE: CPT | Performed by: STUDENT IN AN ORGANIZED HEALTH CARE EDUCATION/TRAINING PROGRAM

## 2024-04-26 PROCEDURE — 63600175 PHARM REV CODE 636 W HCPCS

## 2024-04-26 PROCEDURE — 80076 HEPATIC FUNCTION PANEL: CPT | Performed by: STUDENT IN AN ORGANIZED HEALTH CARE EDUCATION/TRAINING PROGRAM

## 2024-04-26 PROCEDURE — 85610 PROTHROMBIN TIME: CPT | Performed by: STUDENT IN AN ORGANIZED HEALTH CARE EDUCATION/TRAINING PROGRAM

## 2024-04-26 PROCEDURE — 20600001 HC STEP DOWN PRIVATE ROOM

## 2024-04-26 PROCEDURE — 36415 COLL VENOUS BLD VENIPUNCTURE: CPT

## 2024-04-26 PROCEDURE — 97165 OT EVAL LOW COMPLEX 30 MIN: CPT

## 2024-04-26 PROCEDURE — 63700000 PHARM REV CODE 250 ALT 637 W/O HCPCS

## 2024-04-26 PROCEDURE — 94761 N-INVAS EAR/PLS OXIMETRY MLT: CPT

## 2024-04-26 RX ORDER — LACTULOSE 10 G/15ML
15 SOLUTION ORAL; RECTAL 3 TIMES DAILY
Qty: 600 ML | Refills: 3 | Status: SHIPPED | OUTPATIENT
Start: 2024-04-26 | End: 2024-05-01 | Stop reason: HOSPADM

## 2024-04-26 RX ORDER — MIDODRINE HYDROCHLORIDE 10 MG/1
10 TABLET ORAL 3 TIMES DAILY
Qty: 90 TABLET | Refills: 11 | Status: SHIPPED | OUTPATIENT
Start: 2024-04-26 | End: 2025-04-26

## 2024-04-26 RX ORDER — FUROSEMIDE 10 MG/ML
40 INJECTION INTRAMUSCULAR; INTRAVENOUS ONCE
Status: DISCONTINUED | OUTPATIENT
Start: 2024-04-26 | End: 2024-04-26

## 2024-04-26 RX ORDER — FUROSEMIDE 40 MG/1
40 TABLET ORAL DAILY
Status: DISCONTINUED | OUTPATIENT
Start: 2024-04-26 | End: 2024-04-26

## 2024-04-26 RX ORDER — FUROSEMIDE 20 MG/1
40 TABLET ORAL DAILY
Qty: 60 TABLET | Refills: 11 | Status: SHIPPED | OUTPATIENT
Start: 2024-04-26 | End: 2024-05-01 | Stop reason: HOSPADM

## 2024-04-26 RX ORDER — FUROSEMIDE 10 MG/ML
40 INJECTION INTRAMUSCULAR; INTRAVENOUS ONCE
Status: COMPLETED | OUTPATIENT
Start: 2024-04-26 | End: 2024-04-26

## 2024-04-26 RX ORDER — FOLIC ACID 1 MG/1
1 TABLET ORAL DAILY
Qty: 90 TABLET | Refills: 0 | Status: SHIPPED | OUTPATIENT
Start: 2024-04-27 | End: 2024-05-01 | Stop reason: HOSPADM

## 2024-04-26 RX ORDER — LANOLIN ALCOHOL/MO/W.PET/CERES
100 CREAM (GRAM) TOPICAL DAILY
Qty: 90 TABLET | Refills: 3 | Status: SHIPPED | OUTPATIENT
Start: 2024-04-27 | End: 2024-05-01 | Stop reason: HOSPADM

## 2024-04-26 RX ADMIN — AZITHROMYCIN DIHYDRATE 500 MG: 250 TABLET ORAL at 08:04

## 2024-04-26 RX ADMIN — HYDROMORPHONE HYDROCHLORIDE 2 MG: 2 TABLET ORAL at 08:04

## 2024-04-26 RX ADMIN — PANTOPRAZOLE SODIUM 40 MG: 40 INJECTION, POWDER, FOR SOLUTION INTRAVENOUS at 11:04

## 2024-04-26 RX ADMIN — MIDODRINE HYDROCHLORIDE 10 MG: 5 TABLET ORAL at 08:04

## 2024-04-26 RX ADMIN — LACTULOSE 15 G: 20 SOLUTION ORAL at 02:04

## 2024-04-26 RX ADMIN — THERA TABS 1 TABLET: TAB at 08:04

## 2024-04-26 RX ADMIN — MIDODRINE HYDROCHLORIDE 10 MG: 5 TABLET ORAL at 02:04

## 2024-04-26 RX ADMIN — LACTULOSE 15 G: 20 SOLUTION ORAL at 08:04

## 2024-04-26 RX ADMIN — THIAMINE HCL TAB 100 MG 100 MG: 100 TAB at 08:04

## 2024-04-26 RX ADMIN — FOLIC ACID 1 MG: 1 TABLET ORAL at 08:04

## 2024-04-26 RX ADMIN — LORAZEPAM 2 MG: 1 TABLET ORAL at 08:04

## 2024-04-26 RX ADMIN — NICOTINE 1 PATCH: 14 PATCH, EXTENDED RELEASE TRANSDERMAL at 08:04

## 2024-04-26 RX ADMIN — FUROSEMIDE 40 MG: 10 INJECTION, SOLUTION INTRAVENOUS at 12:04

## 2024-04-26 RX ADMIN — HYDROMORPHONE HYDROCHLORIDE 2 MG: 2 TABLET ORAL at 05:04

## 2024-04-26 NOTE — ASSESSMENT & PLAN NOTE
Patient has hyponatremia which is uncontrolled,We will aim to correct the sodium by 4-6mEq in 24 hours. We will monitor sodium Daily. The hyponatremia is due to Cirrhosis. We will obtain the following studies: Urine sodium, urine osmolality, serum osmolality. We will treat the hyponatremia with Fluid restriction of:  1.5 liter per day. The patient's sodium results have been reviewed and are listed below.  Recent Labs   Lab 04/26/24  0917   *     Improving with fluid restriction and low sodium diet.   Midodrine, octreotide, albumin infusion   - F/u nephrology recommendations  - D5 given 4/24 to prevent overcorrection

## 2024-04-26 NOTE — PT/OT/SLP EVAL
"Occupational Therapy   Evaluation    Name: Carola Tovar  MRN: [id number]  Admitting Diagnosis: Decompensated hepatic cirrhosis  Recent Surgery: Procedure(s) (LRB):  EGD (ESOPHAGOGASTRODUODENOSCOPY) (N/A) 3 Days Post-Op    Recommendations:     Discharge Recommendations: Low Intensity Therapy  Discharge Equipment Recommendations:  shower chair  Barriers to discharge:  None    Assessment:     Carola Tovar is a 38 y.o. female with a medical diagnosis of Decompensated hepatic cirrhosis.  She presents with the following performance deficits affecting function: weakness, impaired endurance, impaired self care skills, impaired functional mobility, gait instability, impaired balance, decreased safety awareness, decreased lower extremity function. Pt willing to participate, tolerated session well overall. Pt able to perform functional mobility and ADLs assessed c/ little assistance. Pt demonstrated decreased balance and safety awareness during ambulation but had good activity tolerance.     Rehab Prognosis: Good; patient would benefit from acute skilled OT services to address these deficits and reach maximum level of function.       Plan:     Patient to be seen 3 x/week to address the above listed problems via self-care/home management, therapeutic activities, therapeutic exercises  Plan of Care Expires: 05/26/24  Plan of Care Reviewed with: patient    Subjective     Chief Complaint: LE shaking/tremors  Patient/Family Comments/goals: "I've been getting up by myself."    Occupational Profile:  Living Environment: [facility], 3STE no hand rail, WIS, standard toilet; lives c/ SO  Previous level of function: indep, doesn't drive, doesn't work  Roles and Routines: SO  Equipment Used at Home: none  Assistance upon Discharge: per pt report, SO works during the day; support appears limited at this time    Pain/Comfort:  Pain Rating 1: 0/10  Pain Rating Post-Intervention 1: 0/10    Patients cultural, spiritual, Evangelical conflicts given the " current situation: no    Objective:     Communicated with: RN prior to session.  Patient found supine with oxygen, telemetry upon OT entry to room.    General Precautions: Standard, fall  Orthopedic Precautions: N/A  Braces: N/A  Respiratory Status: Nasal cannula, flow 3.5 L/min    Occupational Performance:    Bed Mobility:    Patient completed Rolling/Turning to Right with stand by assistance  Patient completed Scooting/Bridging with stand by assistance  Patient completed Supine to Sit with stand by assistance    Functional Mobility/Transfers:  Patient completed Sit <> Stand Transfer with contact guard assistance  with  no assistive device   Patient completed Toilet Transfer Step Transfer technique with contact guard assistance with  no AD  Chair t/f: CGA no AD  Functional Mobility: from bed to bathroom to chair; Min A c/ HHA; decreased balance, LE's shaking, slow and small steps; no LOB, no reported LE weakness or dizziness    Activities of Daily Living:  Grooming: declined to perform    Upper Body Dressing: declined to perform    Lower Body Dressing: setup assistance; pt donned socks sitting eob    Toileting: pt not needing to void at this time      Cognitive/Visual Perceptual:  Cognitive/Psychosocial Skills:     -       Oriented to: Person, Place, Time, and Situation   -       Follows Commands/attention:Follows one-step commands and Follows two-step commands  -       Safety awareness/insight to disability: decreased insight into disability     Physical Exam:  Balance:    -       static sitting balance: SBA eob ~ 7min; dynamic standing balance: Min A   Upper Extremity Range of Motion:     -       Right Upper Extremity: WFL  -       Left Upper Extremity: WFL  Upper Extremity Strength:    -       Right Upper Extremity: grossly 5/5  -       Left Upper Extremity: grossly 5/5   Strength:    -       Right Upper Extremity: WFL  -       Left Upper Extremity: WFL  Fine Motor Coordination:    -       Intact  Left hand  thumb/finger opposition skills, Right hand thumb/finger opposition skills, Left hand, manipulation of objects, and Right hand, manipulation of objects    AMPAC 6 Click ADL:  AMPAC Total Score: 22    Treatment & Education:  Pt edu on role of OT, POC, safety when performing self care tasks , benefit of performing OOB activity, and safety when performing functional transfers and mobility.  - White board updated  - Self care tasks completed-- as noted above      Patient left up in chair with all lines intact, call button in reach, and RN notified    GOALS:   Multidisciplinary Problems       Occupational Therapy Goals          Problem: Occupational Therapy    Goal Priority Disciplines Outcome Interventions   Occupational Therapy Goal     OT, PT/OT Progressing    Description: Goals to be met by: 5/10/2024     Patient will increase functional independence with ADLs by performing:    UE Dressing with [place].  LE Dressing with [place].  Grooming while standing at sink with [place].  Toileting from toilet with [place] for hygiene and clothing management.   Toilet transfer to toilet with [place].                         History:     No past medical history on file.      Past Surgical History:   Procedure Laterality Date    ESOPHAGOGASTRODUODENOSCOPY N/A 4/23/2024    Procedure: EGD (ESOPHAGOGASTRODUODENOSCOPY);  Surgeon: Manuel Moore MD;  Location: [address]);  Service: Endoscopy;  Laterality: N/A;       Time Tracking:     OT Date of Treatment: 04/26/24  OT Start Time: 0928  OT Stop Time: 0945  OT Total Time (min): 17 min    Billable Minutes:Evaluation 8  Neuromuscular Re-education 9    4/26/2024

## 2024-04-26 NOTE — PLAN OF CARE
[facility] - Telemetry Stepdown      HOME HEALTH ORDERS  FACE TO FACE ENCOUNTER    Patient Name: Carola Tovar  YOB: 1985    PCP: Roro, Primary Doctor   PCP Address: None  PCP Phone Number: None  PCP Fax: None    Encounter Date: 4/20/24    Admit to Home Health    Diagnoses:  Active Hospital Problems    Diagnosis  POA    *Decompensated hepatic cirrhosis [K72.90, K74.60]  Yes    Encounter for pre-transplant evaluation for liver transplant [Z01.818]  Not Applicable    Alcohol withdrawal, uncomplicated [F10.930]  Yes    PB (acute kidney injury) [N17.9]  No    Acute hypoxemic respiratory failure [J96.01]  Yes    Alcohol use disorder [F10.90]  Yes    Hyponatremia [E87.1]  Yes    Coagulopathy [D68.9]  Yes    Alcoholic cirrhosis [K70.30]  Yes    Acute blood loss anemia [D62]  Yes    Acute cystitis [N30.00]  Yes    UGIB (upper gastrointestinal bleed) [K92.2]  Yes      Resolved Hospital Problems    Diagnosis Date Resolved POA    Acute liver failure without hepatic coma [K72.00] 04/20/2024 Yes    Hypomagnesemia [E83.42] 04/22/2024 Yes       Follow Up Appointments:  No future appointments.    Allergies:  Review of patient's allergies indicates:   Allergen Reactions    Sulfa (sulfonamide antibiotics) Hives       Medications: Review discharge medications with patient and family and provide education.    Current Facility-Administered Medications   Medication Dose Route Frequency Provider Last Rate Last Admin    azithromycin tablet 500 mg  500 mg Oral Daily Sakina Delong DO   500 mg at 04/26/24 0842    benzonatate capsule 200 mg  200 mg Oral TID PRN Theresa Alvarez MD   200 mg at 04/25/24 0950    dextrose 10% bolus 125 mL 125 mL  12.5 g Intravenous PRN Kobi Villarreal MD        dextrose 10% bolus 250 mL 250 mL  25 g Intravenous PRN Kobi Villarreal MD        folic acid tablet 1 mg  1 mg Oral Daily Kobi Villarreal MD   1 mg at 04/26/24 0843    glucagon (human  recombinant) injection 1 mg  1 mg Intramuscular PRN Kobi Villarreal MD        glucose chewable tablet 16 g  16 g Oral PRN Kobi Villarreal MD        glucose chewable tablet 24 g  24 g Oral PRN Kobi Villarreal MD        HYDROmorphone tablet 2 mg  2 mg Oral BID PRN Theresa Alvarez MD   2 mg at 04/26/24 0508    lactulose 20 gram/30 mL solution Soln 15 g  15 g Oral TID Yessica Garcia, DO   15 g at 04/26/24 1425    LORazepam tablet 2 mg  2 mg Oral Q4H PRN Yessica Garcia, DO   2 mg at 04/26/24 0848    midodrine tablet 10 mg  10 mg Oral TID Theresa Alvarez MD   10 mg at 04/26/24 1425    multivitamin tablet  1 tablet Oral Daily Kobi Villarreal MD   1 tablet at 04/26/24 0842    naloxone 0.4 mg/mL injection 0.02 mg  0.02 mg Intravenous PRN Kobi Villarreal MD        nicotine 14 mg/24 hr 1 patch  1 patch Transdermal Daily Sakina Delong DO   1 patch at 04/26/24 0842    pantoprazole injection 40 mg  40 mg Intravenous Daily Sakina Delong DO   40 mg at 04/26/24 1101    sodium chloride 0.9% flush 10 mL  10 mL Intravenous Q12H PRN Kobi Villarreal MD        thiamine tablet 100 mg  100 mg Oral Daily Theresa Alvarez MD   100 mg at 04/26/24 0842     There are no discharge medications for this patient.        I have seen and examined this patient within the last 30 days. My clinical findings that support the need for the home health skilled services and home bound status are the following:no   Weakness/numbness causing balance and gait disturbance due to Liver Disease making it taxing to leave home.     Diet:   2 gram sodium diet    Labs:  SN to perform labs:  CBC: Weekly; 4 week(s) and BMP: Weekly; 4 week(s)    Referrals/ Consults  Physical Therapy to evaluate and treat. Evaluate for home safety and equipment needs; Establish/upgrade home exercise program. Perform / instruct on therapeutic exercises, gait training, transfer  training, and Range of Motion.  Occupational Therapy to evaluate and treat. Evaluate home environment for safety and equipment needs. Perform/Instruct on transfers, ADL training, ROM, and therapeutic exercises.   to evaluate for community resources/long-range planning.  Aide to provide assistance with personal care, ADLs, and vital signs.    Activities:   activity as tolerated    Nursing:   Agency to admit patient within 24 hours of hospital discharge unless specified on physician order or at patient request    SN to complete comprehensive assessment including routine vital signs. Instruct on disease process and s/s of complications to report to MD. Review/verify medication list sent home with the patient at time of discharge  and instruct patient/caregiver as needed. Frequency may be adjusted depending on start of care date.     Skilled nurse to perform up to 3 visits PRN for symptoms related to diagnosis    Notify MD if SBP > 160 or < 90; DBP > 90 or < 50; HR > 120 or < 50; Temp > 101; O2 < 88%    Ok to schedule additional visits based on staff availability and patient request on consecutive days within the home health episode.    When multiple disciplines ordered:    Start of Care occurs on Sunday - Wednesday schedule remaining discipline evaluations as ordered on separate consecutive days following the start of care.    Thursday SOC -schedule subsequent evaluations Friday and Monday the following week.     Friday - Saturday SOC - schedule subsequent discipline evaluations on consecutive days starting Monday of the following week.    For all post-discharge communication and subsequent orders please contact patient's primary care physician.     Miscellaneous   Home Oxygen:  Oxygen at 2 L/min nasal canula to be used:  As needed for SOB. (Pending 6MWT results)    Home Health Aide:  Nursing Three times weekly, Physical Therapy Twice weekly, Occupational Therapy Twice weekly, Medical Social Work Twice  weekly, and Home Health Aide Three times weekly    Wound Care Orders  no    I certify that this patient is confined to her home and needs intermittent skilled nursing care, physical therapy, and occupational therapy.      Yessica Garcia,  PGY2

## 2024-04-26 NOTE — PLAN OF CARE
[facility] - Telemetry Stepdown  Discharge Reassessment    Primary Care Provider: No, Primary Doctor    Expected Discharge Date: 4/30/2024    Reassessment (most recent)       Discharge Reassessment - 04/26/24 1233          Discharge Reassessment    Assessment Type Discharge Planning Reassessment     Did the patient's condition or plan change since previous assessment? No     Discharge Plan discussed with: Patient     Communicated KARSON with patient/caregiver Yes     Discharge Plan A Home with family     Discharge Plan B Home     DME Needed Upon Discharge  other (see comments)   TBD    Transition of Care Barriers Unisured     Why the patient remains in the hospital Requires continued medical care        Post-Acute Status    Post-Acute Authorization Other     Other Status No Post-Acute Service Needs                   Discharge Plan A and Plan B have been determined by review of patient's clinical status, future medical and therapeutic needs, and coverage/benefits for post-acute care in coordination with multidisciplinary team members.     Ania White RN  Ext [phone]

## 2024-04-26 NOTE — PLAN OF CARE
Problem: Adult Inpatient Plan of Care  Goal: Absence of Hospital-Acquired Illness or Injury  Outcome: Progressing  Goal: Optimal Comfort and Wellbeing  Outcome: Progressing  Goal: Readiness for Transition of Care  Outcome: Progressing     Problem: Fluid and Electrolyte Imbalance (Acute Kidney Injury/Impairment)  Goal: Fluid and Electrolyte Balance  Outcome: Progressing     Resting quietly in room with eyes closed, breathing even and un-labored, safety checks done, no acute distress noted at this time.

## 2024-04-26 NOTE — PLAN OF CARE
Problem: Occupational Therapy  Goal: Occupational Therapy Goal  Description: Goals to be met by: 5/10/2024     Patient will increase functional independence with ADLs by performing:    UE Dressing with [place].  LE Dressing with [place]roquois.  Grooming while standing at sink with [place]roquois.  Toileting from toilet with [place]roqu[place] for hygiene and clothing management.   Toilet transfer to toilet with [place]ro[place]ois.    Outcome: Progressing

## 2024-04-26 NOTE — ASSESSMENT & PLAN NOTE
Pt presenting to [facility] for acute on chronic liver failure. Has a history of alcohol use disorder. Physical exam remarkable for right CVA tenderness, jaundice, sclera icterus. Reports having melena on her stool. Hgb 6.9 and plts 42.     CTA negative for any intraabdominal bleeding.  Cirrhotic configuration liver with stigmata of portal venous hypertension and moderate volume abdominopelvic ascites. Relatively diffuse colonic wall thickening with submucosal edema could relate to portal colopathy. Unable to perform EGD due to respiratory status.     Plan  - GI consulted, appreciate recs  - Trend Hgb q6hrs. Transfuse for Hgb <7, unless otherwise indicated  - Maintain IV access with 2 large bore Ivs  - Intravascular resuscitation/support with IVFs   - Hold all NSAIDs and anticoagulants, unless contraindicated  - Bolus IV pantoprazole 80mg followed by 40mg BID  - Continue Octreotide and CTX  - Please correct any coagulopathy with platelets and FFP for goal of platelets >50K and INR <2.0  - Please notify GI team if there is significant change in patient's clinical status  - EGD cancelled due to respiratory status  - No further reports of bleeding

## 2024-04-26 NOTE — ASSESSMENT & PLAN NOTE
Pt with hx of alcohol use disorder [place] [facility] [place]. Reports last drink was on 04/19/24. Also has a hx of alcohol withdrawal with no seizure activity.     Alcohol Withdrawal precautions:  - Vitals q4h while awake  - CIWA monitoring  - Lorazepam PRN if 2 criteria are met: Systolic BP>160, Diastolic BP >110 or Pulse >110  - Start Vitamin supplementation- Thiamine, Folic acid, Vit. B12, and Multivitamin qd  RESOLVED

## 2024-04-26 NOTE — ASSESSMENT & PLAN NOTE
Patient's anemia is currently uncontrolled. Has not received any PRBCs to date. Etiology likely d/t chronic disease due to Chronic liver disease  Current CBC reviewed-   Lab Results   Component Value Date    HGB 8.1 (L) 04/26/2024    HCT 24.1 (L) 04/26/2024     - s/p 2 PRBC  - Type and screen   - Transfusing one unit of blood  - Monitor serial CBC and transfuse if patient becomes hemodynamically unstable, symptomatic or H/H drops below 7/21.  - No further reports of bleeding, EGD discontinued  RESOLVED

## 2024-04-26 NOTE — PROGRESS NOTES
[facility] - Telemetry [facility] Medicine  Progress Note    Patient Name: Carola Tovar  MRN: [id number]  Patient Class: IP- Inpatient   Admission Date: 4/20/2024  Length of Stay: 6 days  Attending Physician: Theresa Alvarez MD  Primary Care Provider: Roro, Primary Doctor        Subjective:     Principal Problem:Decompensated hepatic cirrhosis        HPI:  Ms. Tovar is a 37 yo F with alcohol abuse disorder who presented to OSH ED with jaundice. Pt is originally from [place], but have spent the last 1.5 months in [place] visiting a friend. For the past month she has started noticing yellow discoloration in her eyes and skin. Pt reports that she used to drink 48 oz of vodka daily, but for the past week she has taper down to only 16 oz per day. Last drink was yesterday 04/19. She also reports multiple episodes of withdrawal in the past, but no seizure episodes. She also report dysuria and increased urinary frequency. Denies headaches, visual changes, SOB, cough, chest pain, palpitation, nausea, vomiting, abdominal pain, diarrhea, constipation.     At the OSH ED the patient was afebrile tachycardic to 120 with /42. Lab work remarkable for Hb/Hct 7.4/21.0, INR 2.57, Na 120, K 2.5, Mg 0.7. Tbili of 24. U/A nitrite positive. Given IV CTX, Mg and K replacements. Hep studies negative. CT A/P shows cirrhosis and portal HTN, gastric diffuse bowel wall thickening and jose-pancreatic fat stranding. Pt was transferred to [facility] [place] for higher level of care. Pt admitted to hospital medicine for further management of acute on chronic liver failure and hepatology was consulted.     Overview/Hospital Course:  Pt admitted to hospital medicine for decompensated hepatic cirrhosis 2/2 to alcohol use complicated by HE and melena. MELD was 31 during admission. GI and Hepatology consulted. Hgb 6.9 pt received 2 units of PRBC. Plts low and elevated INR and PT, cryo, plts and vit k given. Low calcium, calcium gluconate given. US  liver showing ascites, Reversal of flow of the main portal, right portal, and left portal veins, which can be seen with liver disease. Hepatomegaly with heterogeneous echotexture. CTA negative for any intraabdominal bleeding.  Cirrhotic configuration liver with stigmata of portal venous hypertension and moderate volume abdominopelvic ascites. Relatively diffuse colonic wall thickening with submucosal edema could relate to portal colopathy. Sodium trending down to 118, pt AOX4. Critical care consulted, pt stable to continue under hospital medicine services. Initiated on octreotide, midodrine, and albumin. Hepatology consulted for transplant evaluation, deemed not a candidate. Treated with prn ativan for alcohol withdrawal. Developed worsening PB concerning for HRS, nephrology consulted. Patient did not tolerate albumin challenge which made HRS less likely. Patient increased oxygen requirements night to 30L HFNC from 2L NC. MICU recommended continued diuresis. Oxygen requirements improved with spot diuresis Lasix 40 x2. Hepatology did not think she was a candidate for liver transplant due to her active alcohol use despite knowing of her liver disease. Her EGD was delayed on 4/23 due to worsening oxygen requirements overnight. She has not had further bleeding episodes. Azithromycin added for atypical PNA 4/25 due to cough that started before her admission. RIP was negative. Her O2 requirements have improved. Unable to find a pocket for paracentesis. She completed SBP ppx. Patient was agreeable to follow with palliative in [place]. Her support system includes her boyfriend and 4 children. She will likely need hospice in the future. Evaluated by PT/OT that recommended a shower chair on discharge.       Interval History: 6 MWT today to assess oxygen needs at home. May be a barrier for discharge given insurance status.       Review of Systems  Objective:     Vital Signs (Most Recent):  Temp: 98.2 °F (36.8 °C)  (04/26/24 1142)  Pulse: 72 (04/26/24 1142)  Resp: 20 (04/26/24 1142)  BP: (!) 113/54 (04/26/24 1142)  SpO2: (!) 94 % (04/26/24 1142) Vital Signs (24h Range):  Temp:  [98 °F (36.7 °C)-98.5 °F (36.9 °C)] 98.2 °F (36.8 °C)  Pulse:  [72-93] 72  Resp:  [17-20] 20  SpO2:  [86 %-98 %] 94 %  BP: (101-117)/(53-71) 113/54     Weight: 71.7 kg (158 lb)  Body mass index is 25.5 kg/m².    Intake/Output Summary (Last 24 hours) at 4/26/2024 1458  Last data filed at 4/26/2024 1351  Gross per 24 hour   Intake 550 ml   Output --   Net 550 ml         Physical Exam  Constitutional:       General: She is not in acute distress.     Appearance: She is ill-appearing.   HENT:      Head: Normocephalic and atraumatic.   Eyes:      General: Scleral icterus present.      Extraocular Movements: Extraocular movements intact.   Cardiovascular:      Rate and Rhythm: Normal rate and regular rhythm.   Pulmonary:      Effort: No respiratory distress.      Breath sounds: No wheezing.      Comments: Crackles on LLL , on 2 L NC  Abdominal:      General: There is distension.      Palpations: Abdomen is soft.      Tenderness: There is no abdominal tenderness. There is no guarding or rebound.      Comments: No asterixis   Skin:     Coloration: Skin is jaundiced.   Neurological:      Mental Status: She is alert and oriented to person, place, and time.             Significant Labs: All pertinent labs within the past 24 hours have been reviewed.    Significant Imaging: I have reviewed all pertinent imaging results/findings within the past 24 hours.    Assessment/Plan:      * Decompensated hepatic cirrhosis  Pt with a hx of alcohol use disorder presenting from OSH for complains of jaundice. She drinks 48 oz of vodka daily, however has been trying to taper down. Has a history of alcohol withdrawal in the past, but no seizures. As reported per transfer note lab work at OSH was remarkable for  Hb/Hct 7.4/21.0, INR 2.57, Na 120, K 2.5, Mg 0.7. Tbili of 24. . Hep  studies negative. CT A/P shows cirrhosis and portal HTN, gastric diffuse bowel wall thickening and jose-pancreatic fat stranding. Physical exam remarkable for sclera icterus, jaundice, asterixis and abdominal distention. US abdomen did not showed a viable pocket for paracentesis.   - US liver showing ascites, Reversal of flow of the main portal, right portal, and left portal veins, which can be seen with liver disease. Hepatomegaly with heterogeneous echotexture.   - CTA negative for any intraabdominal bleeding.  Cirrhotic configuration liver with stigmata of portal venous hypertension and moderate volume abdominopelvic ascites. Relatively diffuse colonic wall thickening with submucosal edema could relate to portal colopathy.  - Hepatology consulted. Not a candidate for liver transplant at this time given active alcohol use    Plan  - Continue CTX for SBP PPX (5-7 day course)  - PPI for stress ppx  - Octreotide discontinued, monitoring for bleed  - F/u PeTH, anti sm muscle, anti mitochondrial abs   - Continue lactulose for goal 3-4 BMs per day   - Unable to find pocket for paracentesis   - Addiction psych consulted  - Not a candidate for liver transplant  - Amenable to palliative   - Goal: stable, compensated, see palliative in [place]       Acute hypoxemic respiratory failure  Acute respiratory status due to pleural effusion seen on CXR and URI (coughing before admission).    -RIP negative  -Spot diuresis as needed  -O2 requirements improving        Alcohol withdrawal, uncomplicated  Resolved      UGIB (upper gastrointestinal bleed)  Pt [place] [facility] [place] chronic liver failure. Has a history of alcohol use disorder. Physical exam remarkable for right CVA tenderness, jaundice, sclera icterus. Reports having melena on her stool. Hgb 6.9 and plts 42.     CTA negative for any intraabdominal bleeding.  Cirrhotic configuration liver with stigmata of portal venous hypertension and moderate volume  abdominopelvic ascites. Relatively diffuse colonic wall thickening with submucosal edema could relate to portal colopathy. Unable to perform EGD due to respiratory status.     Plan  - GI consulted, appreciate recs  - Trend Hgb q6hrs. Transfuse for Hgb <7, unless otherwise indicated  - Maintain IV access with 2 large bore Ivs  - Intravascular resuscitation/support with IVFs   - Hold all NSAIDs and anticoagulants, unless contraindicated  - Bolus IV pantoprazole 80mg followed by 40mg BID  - Continue Octreotide and CTX  - Please correct any coagulopathy with platelets and FFP for goal of platelets >50K and INR <2.0  - Please notify GI team if there is significant change in patient's clinical status  - EGD cancelled due to respiratory status  - No further reports of bleeding      Acute cystitis  Pt presenting from OSH for concerns of liver failure. Pt reports dysuria and urinary frequency. UA at OSH remarkable for nitrite positive. Given IV CTX.    Plan  - UA and urine culture  - continue rocephin   RESOLVED      Acute blood loss anemia  Patient's anemia is currently uncontrolled. Has not received any PRBCs to date. Etiology likely d/t chronic disease due to Chronic liver disease  Current CBC reviewed-   Lab Results   Component Value Date    HGB 8.1 (L) 04/26/2024    HCT 24.1 (L) 04/26/2024     - s/p 2 PRBC  - Type and screen   - Transfusing one unit of blood  - Monitor serial CBC and transfuse if patient becomes hemodynamically unstable, symptomatic or H/H drops below 7/21.  - No further reports of bleeding, EGD discontinued  RESOLVED      Alcoholic cirrhosis  Patient with known Cirrhosis with Child's class C. Co-morbidities are present and inclusive of portal hypertension and anemia/pancytopenia.  MELD-Na score calculated; MELD 3.0: 30 at 4/25/2024  8:25 AM  MELD-Na: 27 at 4/25/2024  8:25 AM  Calculated from:  Serum Creatinine: 1.1 mg/dL at 4/25/2024  8:25 AM  Serum Sodium: 133 mmol/L at 4/25/2024  8:25 AM  Total  Bilirubin: 17.7 mg/dL at 4/24/2024  2:53 AM  Serum Albumin: 2.2 g/dL at 4/24/2024  2:53 AM  INR(ratio): 1.9 at 4/25/2024  6:20 AM  Age at listing (hypothetical): 38 years  Sex: Female at 4/25/2024  8:25 AM      Continue chronic meds. Etiology likely ETOH. Will avoid any hepatotoxic meds, and monitor CBC/CMP/INR for synthetic function.     For plan please see decompensated cirrhosis    Coagulopathy  Pt with hx of alcohol use disorder and recently diagnosed alcohol liver cirrhosis. Lab work remarkable for elevated INR and Plts 42, PT 25.8 INR 2.5 Fibrinogen 105.  - S/p 2 units prbc, 1 unit plts, and 1 unit cryo on admission    Plan  - Plts goal > 50K  - CBC q6 hrs  - Daily PT/INR    Hyponatremia  Patient has hyponatremia which is uncontrolled,We will aim to correct the sodium by 4-6mEq in 24 hours. We will monitor sodium Daily. The hyponatremia is due to Cirrhosis. We will obtain the following studies: Urine sodium, urine osmolality, serum osmolality. We will treat the hyponatremia with Fluid restriction of:  1.5 liter per day. The patient's sodium results have been reviewed and are listed below.  Recent Labs   Lab 04/26/24  0917   *     Improving with fluid restriction and low sodium diet.   Midodrine, octreotide, albumin infusion   - F/u nephrology recommendations  - D5 given 4/24 to prevent overcorrection     Alcohol use disorder  Pt with hx of alcohol use disorder presenting to [facility] with acute on chronic liver failure. Reports last drink was on 04/19/24. Also has a hx of alcohol withdrawal with no seizure activity.     Alcohol Withdrawal precautions:  - Vitals q4h while awake  - CIWA monitoring  - Lorazepam PRN if 2 criteria are met: Systolic BP>160, Diastolic BP >110 or Pulse >110  - Start Vitamin supplementation- Thiamine, Folic acid, Vit. B12, and Multivitamin qd  RESOLVED        VTE Risk Mitigation (From admission, onward)           Ordered     IP VTE LOW RISK PATIENT  Once         04/20/24 1347     Place  sequential compression device  Until discontinued         04/20/24 1347                    Discharge Planning   KARSON: 4/30/2024     Code Status: Full Code   Is the patient medically ready for discharge?:     Reason for patient still in hospital (select all that apply): Patient trending condition  Discharge Plan A: Home with family                  Sakina Delong DO  Department of Hospital Medicine   [facility] - Telemetry Stepdown

## 2024-04-26 NOTE — PLAN OF CARE
Patient is alert and oriented, room air, NSR, up to bathrrom SBA to  x1 assist, up to chair, good appetite.

## 2024-04-26 NOTE — SUBJECTIVE & OBJECTIVE
Interval History: 6 MWT today to assess oxygen needs at home. May be a barrier for discharge given insurance status.       Review of Systems  Objective:     Vital Signs (Most Recent):  Temp: 98.2 °F (36.8 °C) (04/26/24 1142)  Pulse: 72 (04/26/24 1142)  Resp: 20 (04/26/24 1142)  BP: (!) 113/54 (04/26/24 1142)  SpO2: (!) 94 % (04/26/24 1142) Vital Signs (24h Range):  Temp:  [98 °F (36.7 °C)-98.5 °F (36.9 °C)] 98.2 °F (36.8 °C)  Pulse:  [72-93] 72  Resp:  [17-20] 20  SpO2:  [86 %-98 %] 94 %  BP: (101-117)/(53-71) 113/54     Weight: 71.7 kg (158 lb)  Body mass index is 25.5 kg/m².    Intake/Output Summary (Last 24 hours) at 4/26/2024 1458  Last data filed at 4/26/2024 1351  Gross per 24 hour   Intake 550 ml   Output --   Net 550 ml         Physical Exam  Constitutional:       General: She is not in acute distress.     Appearance: She is ill-appearing.   HENT:      Head: Normocephalic and atraumatic.   Eyes:      General: Scleral icterus present.      Extraocular Movements: Extraocular movements intact.   Cardiovascular:      Rate and Rhythm: Normal rate and regular rhythm.   Pulmonary:      Effort: No respiratory distress.      Breath sounds: No wheezing.      Comments: Crackles on LLL , on 2 L NC  Abdominal:      General: There is distension.      Palpations: Abdomen is soft.      Tenderness: There is no abdominal tenderness. There is no guarding or rebound.      Comments: No asterixis   Skin:     Coloration: Skin is jaundiced.   Neurological:      Mental Status: She is alert and oriented to person, place, and time.             Significant Labs: All pertinent labs within the past 24 hours have been reviewed.    Significant Imaging: I have reviewed all pertinent imaging results/findings within the past 24 hours.

## 2024-04-26 NOTE — NURSING
Six minute walk test unsuccessful. Patient desatted to 83% on room air. Patient placed on 2 L NC and is 94%. MD notified.

## 2024-04-27 PROBLEM — F10.930 ALCOHOL WITHDRAWAL, UNCOMPLICATED: Status: RESOLVED | Noted: 2024-04-22 | Resolved: 2024-04-27

## 2024-04-27 LAB
ACANTHOCYTES BLD QL SMEAR: PRESENT
ALBUMIN SERPL BCP-MCNC: 2.1 G/DL (ref 3.5–5.2)
ALP SERPL-CCNC: 123 U/L (ref 55–135)
ALT SERPL W/O P-5'-P-CCNC: 28 U/L (ref 10–44)
ANION GAP SERPL CALC-SCNC: 10 MMOL/L (ref 8–16)
ANION GAP SERPL CALC-SCNC: 8 MMOL/L (ref 8–16)
ANISOCYTOSIS BLD QL SMEAR: SLIGHT
AST SERPL-CCNC: 80 U/L (ref 10–40)
BASO STIPL BLD QL SMEAR: ABNORMAL
BASOPHILS # BLD AUTO: ABNORMAL K/UL (ref 0–0.2)
BASOPHILS NFR BLD: 0 % (ref 0–1.9)
BILIRUB DIRECT SERPL-MCNC: 8.4 MG/DL (ref 0.1–0.3)
BILIRUB SERPL-MCNC: 15.8 MG/DL (ref 0.1–1)
BUN SERPL-MCNC: 10 MG/DL (ref 6–20)
BUN SERPL-MCNC: 11 MG/DL (ref 6–20)
BURR CELLS BLD QL SMEAR: ABNORMAL
CALCIUM SERPL-MCNC: 8.1 MG/DL (ref 8.7–10.5)
CALCIUM SERPL-MCNC: 8.3 MG/DL (ref 8.7–10.5)
CHLORIDE SERPL-SCNC: 98 MMOL/L (ref 95–110)
CHLORIDE SERPL-SCNC: 99 MMOL/L (ref 95–110)
CO2 SERPL-SCNC: 29 MMOL/L (ref 23–29)
CO2 SERPL-SCNC: 29 MMOL/L (ref 23–29)
CREAT SERPL-MCNC: 0.8 MG/DL (ref 0.5–1.4)
CREAT SERPL-MCNC: 0.9 MG/DL (ref 0.5–1.4)
DACRYOCYTES BLD QL SMEAR: ABNORMAL
DIFFERENTIAL METHOD BLD: ABNORMAL
DOHLE BOD BLD QL SMEAR: PRESENT
EOSINOPHIL # BLD AUTO: ABNORMAL K/UL (ref 0–0.5)
EOSINOPHIL NFR BLD: 2 % (ref 0–8)
ERYTHROCYTE [DISTWIDTH] IN BLOOD BY AUTOMATED COUNT: ABNORMAL % (ref 11.5–14.5)
EST. GFR  (NO RACE VARIABLE): >60 ML/MIN/1.73 M^2
EST. GFR  (NO RACE VARIABLE): >60 ML/MIN/1.73 M^2
GLUCOSE SERPL-MCNC: 111 MG/DL (ref 70–110)
GLUCOSE SERPL-MCNC: 82 MG/DL (ref 70–110)
HCT VFR BLD AUTO: 22.2 % (ref 37–48.5)
HGB BLD-MCNC: 7.5 G/DL (ref 12–16)
HYPOCHROMIA BLD QL SMEAR: ABNORMAL
IMM GRANULOCYTES # BLD AUTO: ABNORMAL K/UL (ref 0–0.04)
IMM GRANULOCYTES NFR BLD AUTO: ABNORMAL % (ref 0–0.5)
INR PPP: 1.9 (ref 0.8–1.2)
LYMPHOCYTES # BLD AUTO: ABNORMAL K/UL (ref 1–4.8)
LYMPHOCYTES NFR BLD: 15 % (ref 18–48)
MCH RBC QN AUTO: 37.7 PG (ref 27–31)
MCHC RBC AUTO-ENTMCNC: 33.8 G/DL (ref 32–36)
MCV RBC AUTO: 112 FL (ref 82–98)
METAMYELOCYTES NFR BLD MANUAL: 1 %
MONOCYTES # BLD AUTO: ABNORMAL K/UL (ref 0.3–1)
MONOCYTES NFR BLD: 13 % (ref 4–15)
MYELOCYTES NFR BLD MANUAL: 1 %
NEUTROPHILS NFR BLD: 67 % (ref 38–73)
NEUTS BAND NFR BLD MANUAL: 1 %
NRBC BLD-RTO: 0 /100 WBC
OVALOCYTES BLD QL SMEAR: ABNORMAL
PAPPENHEIMER BOD BLD QL SMEAR: PRESENT
PLATELET # BLD AUTO: 40 K/UL (ref 150–450)
PLATELET BLD QL SMEAR: ABNORMAL
PMV BLD AUTO: 10.4 FL (ref 9.2–12.9)
POIKILOCYTOSIS BLD QL SMEAR: ABNORMAL
POLYCHROMASIA BLD QL SMEAR: ABNORMAL
POTASSIUM SERPL-SCNC: 3.5 MMOL/L (ref 3.5–5.1)
POTASSIUM SERPL-SCNC: 3.8 MMOL/L (ref 3.5–5.1)
PROT SERPL-MCNC: 4.7 G/DL (ref 6–8.4)
PROTHROMBIN TIME: 20.2 SEC (ref 9–12.5)
RBC # BLD AUTO: 1.99 M/UL (ref 4–5.4)
SCHISTOCYTES BLD QL SMEAR: ABNORMAL
SCHISTOCYTES BLD QL SMEAR: PRESENT
SMUDGE CELLS BLD QL SMEAR: PRESENT
SODIUM SERPL-SCNC: 136 MMOL/L (ref 136–145)
SODIUM SERPL-SCNC: 137 MMOL/L (ref 136–145)
SPHEROCYTES BLD QL SMEAR: ABNORMAL
TOXIC GRANULES BLD QL SMEAR: PRESENT
WBC # BLD AUTO: 7.74 K/UL (ref 3.9–12.7)
WBC TOXIC VACUOLES BLD QL SMEAR: PRESENT

## 2024-04-27 PROCEDURE — C9113 INJ PANTOPRAZOLE SODIUM, VIA: HCPCS

## 2024-04-27 PROCEDURE — 25000003 PHARM REV CODE 250

## 2024-04-27 PROCEDURE — S4991 NICOTINE PATCH NONLEGEND: HCPCS

## 2024-04-27 PROCEDURE — 63600175 PHARM REV CODE 636 W HCPCS

## 2024-04-27 PROCEDURE — 97162 PT EVAL MOD COMPLEX 30 MIN: CPT

## 2024-04-27 PROCEDURE — 85025 COMPLETE CBC W/AUTO DIFF WBC: CPT | Performed by: STUDENT IN AN ORGANIZED HEALTH CARE EDUCATION/TRAINING PROGRAM

## 2024-04-27 PROCEDURE — 36415 COLL VENOUS BLD VENIPUNCTURE: CPT | Mod: XB

## 2024-04-27 PROCEDURE — 25000003 PHARM REV CODE 250: Performed by: STUDENT IN AN ORGANIZED HEALTH CARE EDUCATION/TRAINING PROGRAM

## 2024-04-27 PROCEDURE — 85610 PROTHROMBIN TIME: CPT | Performed by: STUDENT IN AN ORGANIZED HEALTH CARE EDUCATION/TRAINING PROGRAM

## 2024-04-27 PROCEDURE — 36415 COLL VENOUS BLD VENIPUNCTURE: CPT | Performed by: STUDENT IN AN ORGANIZED HEALTH CARE EDUCATION/TRAINING PROGRAM

## 2024-04-27 PROCEDURE — 80048 BASIC METABOLIC PNL TOTAL CA: CPT | Mod: 91

## 2024-04-27 PROCEDURE — 80076 HEPATIC FUNCTION PANEL: CPT | Performed by: STUDENT IN AN ORGANIZED HEALTH CARE EDUCATION/TRAINING PROGRAM

## 2024-04-27 PROCEDURE — 20600001 HC STEP DOWN PRIVATE ROOM

## 2024-04-27 PROCEDURE — 63700000 PHARM REV CODE 250 ALT 637 W/O HCPCS

## 2024-04-27 RX ORDER — HYDROXYZINE HYDROCHLORIDE 25 MG/1
25 TABLET, FILM COATED ORAL ONCE AS NEEDED
Status: COMPLETED | OUTPATIENT
Start: 2024-04-27 | End: 2024-04-27

## 2024-04-27 RX ORDER — FUROSEMIDE 10 MG/ML
80 INJECTION INTRAMUSCULAR; INTRAVENOUS ONCE
Status: COMPLETED | OUTPATIENT
Start: 2024-04-27 | End: 2024-04-27

## 2024-04-27 RX ADMIN — LACTULOSE 15 G: 20 SOLUTION ORAL at 09:04

## 2024-04-27 RX ADMIN — THERA TABS 1 TABLET: TAB at 09:04

## 2024-04-27 RX ADMIN — PANTOPRAZOLE SODIUM 40 MG: 40 INJECTION, POWDER, FOR SOLUTION INTRAVENOUS at 09:04

## 2024-04-27 RX ADMIN — MIDODRINE HYDROCHLORIDE 10 MG: 5 TABLET ORAL at 09:04

## 2024-04-27 RX ADMIN — AZITHROMYCIN DIHYDRATE 500 MG: 250 TABLET ORAL at 09:04

## 2024-04-27 RX ADMIN — FUROSEMIDE 80 MG: 10 INJECTION, SOLUTION INTRAVENOUS at 10:04

## 2024-04-27 RX ADMIN — MIDODRINE HYDROCHLORIDE 10 MG: 5 TABLET ORAL at 02:04

## 2024-04-27 RX ADMIN — FUROSEMIDE 80 MG: 10 INJECTION, SOLUTION INTRAVENOUS at 09:04

## 2024-04-27 RX ADMIN — FOLIC ACID 1 MG: 1 TABLET ORAL at 09:04

## 2024-04-27 RX ADMIN — LACTULOSE 15 G: 20 SOLUTION ORAL at 02:04

## 2024-04-27 RX ADMIN — HYDROXYZINE HYDROCHLORIDE 25 MG: 25 TABLET, FILM COATED ORAL at 03:04

## 2024-04-27 RX ADMIN — THIAMINE HCL TAB 100 MG 100 MG: 100 TAB at 09:04

## 2024-04-27 RX ADMIN — HYDROMORPHONE HYDROCHLORIDE 2 MG: 2 TABLET ORAL at 10:04

## 2024-04-27 RX ADMIN — NICOTINE 1 PATCH: 14 PATCH, EXTENDED RELEASE TRANSDERMAL at 09:04

## 2024-04-27 NOTE — RESPIRATORY THERAPY
"RAPID RESPONSE RESPIRATORY THERAPY PROACTIVE ROUNDING NOTE             Time of visit: 0945     Code Status: Full Code   : 1985  Bed: 8098/8098 A:   MRN: [id number]  Time spent at the bedside: < 15 min    SITUATION    Evaluated patient for: HFNC Compliance     BACKGROUND    Patient has no past medical history on file.    24 Hours Vitals Range:  Temp:  [98.1 °F (36.7 °C)-98.7 °F (37.1 °C)]   Pulse:  [72-95]   Resp:  [16-20]   BP: (100-120)/(52-61)   SpO2:  [92 %-99 %]     Labs:    Recent Labs     24  0624  0747   NA  --    < > 134* 135* 136   K  --    < > 4.2 4.0 3.8   CL  --    < > 98 98 99   CO2  --    < > 27 29 29   BUN  --    < > 12 11 10   CREATININE  --    < > 0.8 0.8 0.8   GLU  --    < > 102 90 82   PHOS 3.2  --   --   --   --    MG 1.5*  --   --   --   --     < > = values in this interval not displayed.        No results for input(s): "PH", "PCO2", "PO2", "HCO3", "POCSATURATED", "BE" in the last 72 hours.    ASSESSMENT/INTERVENTIONS    Pt with HFNC order, found to be on 3L NC during rounding. Order for HF discontinued and low flow order placed.    Last VS   Temp: 98.6 °F (37 °C) (814)  Pulse: 84 (814)  Resp: 19 (814)  BP: 101/52 (814)  SpO2: 92 % (814)    Level of Consciousness: Level of Consciousness (AVPU): alert  Respiratory Effort: Respiratory Effort: Unlabored Expansion/Accessory Muscle Usage: Expansion/Accessory Muscles/Retractions: no use of accessory muscles, no retractions, expansion symmetric  All Lung Field Breath Sounds: All Lung Fields Breath Sounds: Anterior:, Lateral:, diminished  O2 Device/Concentration: NC 3L  Was the O2 device able to be weaned? Yes  Ambu at bedside: Yes    Active Orders   Respiratory Care    Oxygen Continuous     Frequency: Continuous     Number of Occurrences: Until Specified     Order Questions:      Device type: Low flow      Device: Nasal Cannula (1- 5 Liters)      LPM: " 1-5      Titrate O2 per Oxygen Titration Protocol: Yes      To maintain SpO2 goal of: >= 90%      Notify MD of: Inability to achieve desired SpO2; Sudden change in patient status and requires 20% increase in FiO2; Patient requires >60% FiO2    Pulse Oximetry Continuous     Frequency: Continuous     Number of Occurrences: Until Specified       RECOMMENDATIONS    We recommend: RRT Recs: Continue POC per primary team.      FOLLOW-UP    Please call back the Rapid Response RT, Moshe Lloyd, RRT at x [phone] for any questions or concerns.

## 2024-04-27 NOTE — PT/OT/SLP EVAL
"Physical Therapy Evaluation    Patient Name:  Carola Tovar   MRN:  [id number]    Recommendations:     Discharge Recommendations: Low Intensity Therapy   Discharge Equipment Recommendations: none   Barriers to discharge: None    Assessment:     Carola Tovar is a 38 y.o. female admitted with a medical diagnosis of Decompensated hepatic cirrhosis.  She presents with the following impairments/functional limitations: weakness, impaired endurance, impaired functional mobility, gait instability, decreased lower extremity function, abnormal tone, impaired cardiopulmonary response to activity. Patient requiring no assistance with bed mobility and transfers. She requires increased time with higher level balance challenges and ambulates with gait deviations that are far from her baseline ambulation quality. Recommending low intensity post acute therapy to continue addressing deficits. Stairs to be assessed at subsequent session. spO2 <92% during session, moments of lower spO2 however poor quality waveform noted.     Rehab Prognosis: Good; patient would benefit from acute skilled PT services to address these deficits and reach maximum level of function.    Recent Surgery: Procedure(s) (LRB):  EGD (ESOPHAGOGASTRODUODENOSCOPY) (N/A) 4 Days Post-Op    Plan:     During this hospitalization, patient to be seen 3 x/week to address the identified rehab impairments via gait training, therapeutic activities, therapeutic exercises, neuromuscular re-education, wheelchair management/training and progress toward the following goals:    Plan of Care Expires:  05/27/24    Subjective     Chief Complaint: not stated  Patient/Family Comments/goals: "I usually unplug my oxygen and go to the bathroom by myself"  Pain/Comfort:  Pain Rating 1: 0/10  Pain Rating Post-Intervention 1: 0/10    Patients cultural, spiritual, Orthodoxy conflicts given the current situation: no    Living Environment:  Patient lievs with significant other in camper with 3STE no " HR, WIS.  Prior to admission, patients level of function was independent.  Equipment used at home: none.  DME owned (not currently used): none.  Upon discharge, patient will have assistance from SO.    Objective:     Communicated with RN prior to session.  Patient found HOB elevated with telemetry, Other (comments) (oxygen)  upon PT entry to room.    General Precautions: Standard, fall  Orthopedic Precautions:N/A   Braces: N/A  Respiratory Status: Nasal cannula, flow 2 L/min    Exams:  Cognitive Exam:  Patient is oriented to Person, Place, Time, and Situation  Sensation:    -       Intact  RLE ROM: WNL  RLE Strength: WNL  LLE ROM: WNL  LLE Strength: WNL  MMT: Knee extension, ankle DF/PF      Functional Mobility:  Bed Mobility:     Scooting: independence  Supine to Sit: independence  Sit to Supine: independence  Transfers:     Sit to Stand:  stand by assistance with no AD, increased time  Gait: patient ambulated 30' with SBA no AD  Deviations Noted: decreased gait speed, decreased step height R,L, and decreased step length R, L, some global LE tremulous movement with sequencing   Balance:   Sitting static: independent   Sitting dynamic: independent   Standing static: SBA  Standing dynamic: CGA   Stooping to ground for objects with increased time      AM-PAC 6 CLICK MOBILITY  Total Score:24       Treatment & Education:  Education: patient educated on POC, need for therapy, safety with mobility, discharge recommendations and equipment recommendations. Patient verbalized understanding of all topics.   Cues during ambulation for sequencing, weight shifting, upright posture throughout, appropriate step length and height.      Patient left  ambulating to bathroom  with  Pulse ox intact, patient doffing O2 .    GOALS:   Multidisciplinary Problems       Physical Therapy Goals          Problem: Physical Therapy    Goal Priority Disciplines Outcome Goal Variances Interventions   Physical Therapy Goal     PT, PT/OT Progressing      Description: Goals to be met by: 24     Patient will increase functional independence with mobility by performin. Supine to sit with Roberts  2. Sit to stand transfer with Roberts  3. Bed to chair transfer with Roberts using No Assistive Device  4. Gait  x 150 feet with Supervision using No Assistive Device.   5. Ascend/descend 3 stair with no Handrails Contact Guard Assistance using No Assistive Device.                          History:     No past medical history on file.    Past Surgical History:   Procedure Laterality Date    ESOPHAGOGASTRODUODENOSCOPY N/A 2024    Procedure: EGD (ESOPHAGOGASTRODUODENOSCOPY);  Surgeon: Manuel Moore MD;  Location: [address]);  Service: Endoscopy;  Laterality: N/A;       Time Tracking:     PT Received On: 24  PT Start Time: 1403     PT Stop Time: 1415  PT Total Time (min): 12 min     Billable Minutes: Evaluation 12 minutes       2024

## 2024-04-27 NOTE — ASSESSMENT & PLAN NOTE
Acute respiratory status due to pleural effusion seen on CXR and URI (coughing before admission).    -RIP negative  -Spot diuresis as needed  -O2 requirements improving  - Will likely need to d/c with home O2 if she does not improve with further diuresis; working with CM to find suitable discharge option

## 2024-04-27 NOTE — PLAN OF CARE
Problem: Adult Inpatient Plan of Care  Goal: Absence of Hospital-Acquired Illness or Injury  Outcome: Progressing  Goal: Optimal Comfort and Wellbeing  Outcome: Progressing  Goal: Readiness for Transition of Care  Outcome: Progressing     Problem: Fluid and Electrolyte Imbalance (Acute Kidney Injury/Impairment)  Goal: Fluid and Electrolyte Balance  Outcome: Progressing     Resting quietly in bed with eyes closed, breathing even and un-labored, safety checks done, no acute distress noted at this time.

## 2024-04-27 NOTE — ASSESSMENT & PLAN NOTE
Patient's anemia is currently uncontrolled. Has not received any PRBCs to date. Etiology likely d/t chronic disease due to Chronic liver disease  Current CBC reviewed-   Lab Results   Component Value Date    HGB 7.5 (L) 04/27/2024    HCT 22.2 (L) 04/27/2024     - s/p 2 PRBC  - Type and screen   - Transfusing one unit of blood  - Monitor serial CBC and transfuse if patient becomes hemodynamically unstable, symptomatic or H/H drops below 7/21.  - No further reports of bleeding, EGD discontinued  RESOLVED

## 2024-04-27 NOTE — PROGRESS NOTES
[facility] - [facility]  Progress Note    Patient Name: Carola Tovar  MRN: [id number]  Patient Class: IP- Inpatient   Admission Date: 4/20/2024  Length of Stay: 7 days  Attending Physician: Chinmay Dawn III*  Primary Care Provider: Roro, Primary Doctor        Subjective:     Principal Problem:Decompensated hepatic cirrhosis        HPI:  Ms. Tovar is a 39 yo F with alcohol abuse disorder who presented to OSH ED with jaundice. Pt is originally from [place], but have spent the last 1.5 months in [place] visiting a friend. For the past month she has started noticing yellow discoloration in her eyes and skin. Pt reports that she used to drink 48 oz of vodka daily, but for the past week she has taper down to only 16 oz per day. Last drink was yesterday 04/19. She also reports multiple episodes of withdrawal in the past, but no seizure episodes. She also report dysuria and increased urinary frequency. Denies headaches, visual changes, SOB, cough, chest pain, palpitation, nausea, vomiting, abdominal pain, diarrhea, constipation.     At the OSH ED the patient was afebrile tachycardic to 120 with /42. Lab work remarkable for Hb/Hct 7.4/21.0, INR 2.57, Na 120, K 2.5, Mg 0.7. Tbili of 24. U/A nitrite positive. Given IV CTX, Mg and K replacements. Hep studies negative. CT A/P shows cirrhosis and portal HTN, gastric diffuse bowel wall thickening and jose-pancreatic fat stranding. Pt was transferred to [facility] for higher level of care. Pt admitted to hospital medicine for further management of acute on chronic liver failure and hepatology was consulted.     Overview/Hospital Course:  Pt admitted to hospital medicine for decompensated hepatic cirrhosis 2/2 to alcohol use complicated by HE and melena. MELD was 31 during admission. GI and Hepatology consulted. Hgb 6.9 pt received 2 units of PRBC. Plts low and elevated INR and PT, cryo, plts and vit k given. Low calcium, calcium gluconate given. US  liver showing ascites, Reversal of flow of the main portal, right portal, and left portal veins, which can be seen with liver disease. Hepatomegaly with heterogeneous echotexture. CTA negative for any intraabdominal bleeding.  Cirrhotic configuration liver with stigmata of portal venous hypertension and moderate volume abdominopelvic ascites. Relatively diffuse colonic wall thickening with submucosal edema could relate to portal colopathy. Sodium trending down to 118, pt AOX4. Critical care consulted, pt stable to continue under hospital medicine services. Initiated on octreotide, midodrine, and albumin. Hepatology consulted for transplant evaluation, deemed not a candidate. Treated with prn ativan for alcohol withdrawal. Developed worsening PB concerning for HRS, nephrology consulted. Patient did not tolerate albumin challenge which made HRS less likely. Patient increased oxygen requirements night to 30L HFNC from 2L NC. MICU recommended continued diuresis. Oxygen requirements improved with spot diuresis Lasix 40 x2, however she continued to have 1-2L O2 requirement. Hepatology did not think she was a candidate for liver transplant due to her active alcohol use despite knowing of her liver disease. Her EGD was delayed on 4/23 due to worsening oxygen requirements overnight. She has not had further bleeding episodes, thus EGD then deferred. Azithromycin added for atypical PNA 4/25 due to cough that started before her admission. RIP was negative. Unable to find a pocket for paracentesis. She completed SBP ppx. Patient was agreeable to follow with palliative in [place]. Her support system includes her boyfriend and 4 children. She will likely need hospice in the future. Evaluated by PT/OT that recommended a shower chair on discharge. She did fail a six minute walk test and qualifies for home O2 use, working with CM to find a solution in order to safely be able to discharge to home with O2      Interval History: No  events overnight. Patient reports that she has not increased UOP with 40mg lasix. Will trial 80mg today and assess for response in UOP as well as oxygenation. Lung sounds clear.     Review of Systems  Objective:     Vital Signs (Most Recent):  Temp: 99.2 °F (37.3 °C) (04/27/24 1147)  Pulse: 86 (04/27/24 1147)  Resp: 17 (04/27/24 1147)  BP: (!) 101/52 (04/27/24 1147)  SpO2: 95 % (04/27/24 1147) Vital Signs (24h Range):  Temp:  [98.1 °F (36.7 °C)-99.2 °F (37.3 °C)] 99.2 °F (37.3 °C)  Pulse:  [76-95] 86  Resp:  [16-19] 17  SpO2:  [92 %-99 %] 95 %  BP: (100-120)/(52-61) 101/52     Weight: 71.7 kg (158 lb)  Body mass index is 25.5 kg/m².    Intake/Output Summary (Last 24 hours) at 4/27/2024 1418  Last data filed at 4/27/2024 0800  Gross per 24 hour   Intake 300 ml   Output --   Net 300 ml         Physical Exam  Vitals and nursing note reviewed.   Constitutional:       General: She is not in acute distress.     Appearance: She is ill-appearing.   HENT:      Head: Normocephalic and atraumatic.   Eyes:      General: Scleral icterus present.      Extraocular Movements: Extraocular movements intact.   Cardiovascular:      Rate and Rhythm: Normal rate and regular rhythm.      Heart sounds: Normal heart sounds. No murmur heard.  Pulmonary:      Effort: Pulmonary effort is normal. No respiratory distress.      Breath sounds: Normal breath sounds. No wheezing.      Comments: Crackles on LLL, improved; on 1 L NC  Abdominal:      General: There is distension.      Palpations: Abdomen is soft.      Tenderness: There is no abdominal tenderness. There is no guarding or rebound.      Comments: No asterixis   Skin:     Coloration: Skin is jaundiced.   Neurological:      General: No focal deficit present.      Mental Status: She is alert and oriented to person, place, and time.             Significant Labs: All pertinent labs within the past 24 hours have been reviewed.    Significant Imaging: I have reviewed all pertinent imaging  results/findings within the past 24 hours.    Assessment/Plan:      * Decompensated hepatic cirrhosis  Pt with a hx of alcohol use disorder presenting from OSH for complains of jaundice. She drinks 48 oz of vodka daily, however has been trying to taper down. Has a history of alcohol withdrawal in the past, but no seizures. As reported per transfer note lab work at OSH was remarkable for  Hb/Hct 7.4/21.0, INR 2.57, Na 120, K 2.5, Mg 0.7. Tbili of 24. . Hep studies negative. CT A/P shows cirrhosis and portal HTN, gastric diffuse bowel wall thickening and jose-pancreatic fat stranding. Physical exam remarkable for sclera icterus, jaundice, asterixis and abdominal distention. US abdomen did not showed a viable pocket for paracentesis.   - US liver showing ascites, Reversal of flow of the main portal, right portal, and left portal veins, which can be seen with liver disease. Hepatomegaly with heterogeneous echotexture.   - CTA negative for any intraabdominal bleeding.  Cirrhotic configuration liver with stigmata of portal venous hypertension and moderate volume abdominopelvic ascites. Relatively diffuse colonic wall thickening with submucosal edema could relate to portal colopathy.  - Hepatology consulted. Not a candidate for liver transplant at this time given active alcohol use    Plan  - Completed CTX for empiric SBP coverage  - PPI for stress ppx  - Octreotide discontinued, monitoring for bleed  - Continue lactulose for goal 3-4 BMs per day   - Unable to find pocket for paracentesis   - Addiction psych consulted  - Not a candidate for liver transplant  - Amenable to palliative   - Goal: stable, compensated, see palliative in [place]       Acute hypoxemic respiratory failure  Acute respiratory status due to pleural effusion seen on CXR and URI (coughing before admission).    -RIP negative  -Spot diuresis as needed  -O2 requirements improving  - Will likely need to d/c with home O2 if she does not improve with  further diuresis; working with CM to find suitable discharge option        UGIB (upper gastrointestinal bleed)  Pt presenting to [facility] for acute on chronic liver failure. Has a history of alcohol use disorder. Physical exam remarkable for right CVA tenderness, jaundice, sclera icterus. Reports having melena on her stool. Hgb 6.9 and plts 42.     CTA negative for any intraabdominal bleeding.  Cirrhotic configuration liver with stigmata of portal venous hypertension and moderate volume abdominopelvic ascites. Relatively diffuse colonic wall thickening with submucosal edema could relate to portal colopathy. Unable to perform EGD due to respiratory status.     Plan  - GI consulted, appreciate recs  - Transfuse for Hgb <7, unless otherwise indicated  - Maintain IV access with 2 large bore Ivs  - Intravascular resuscitation/support with IVFs   - Hold all NSAIDs and anticoagulants, unless contraindicated  - Bolus IV pantoprazole 80mg followed by 40mg BID  - Octreotide and CTX now discontinued  - Please correct any coagulopathy with platelets and FFP for goal of platelets >50K and INR <2.0  - EGD cancelled due to respiratory status  - No further reports of bleeding  RESOLVED    Acute cystitis  Pt presenting from OSH for concerns of liver failure. Pt reports dysuria and urinary frequency. UA at OSH remarkable for nitrite positive. Given IV CTX.    Plan  - UA and urine culture  - continue rocephin   RESOLVED      Acute blood loss anemia  Patient's anemia is currently uncontrolled. Has not received any PRBCs to date. Etiology likely d/t chronic disease due to Chronic liver disease  Current CBC reviewed-   Lab Results   Component Value Date    HGB 7.5 (L) 04/27/2024    HCT 22.2 (L) 04/27/2024     - s/p 2 PRBC  - Type and screen   - Transfusing one unit of blood  - Monitor serial CBC and transfuse if patient becomes hemodynamically unstable, symptomatic or H/H drops below 7/21.  - No further reports of bleeding, EGD  discontinued  RESOLVED      Alcoholic cirrhosis  Patient with known Cirrhosis with Child's class C. Co-morbidities are present and inclusive of portal hypertension and anemia/pancytopenia.  MELD-Na score calculated; MELD 3.0: 28 at 4/27/2024  7:47 AM  MELD-Na: 25 at 4/27/2024  7:47 AM  Calculated from:  Serum Creatinine: 0.8 mg/dL (Using min of 1 mg/dL) at 4/27/2024  7:47 AM  Serum Sodium: 136 mmol/L at 4/27/2024  7:47 AM  Total Bilirubin: 15.8 mg/dL at 4/27/2024  2:35 AM  Serum Albumin: 2.1 g/dL at 4/27/2024  2:35 AM  INR(ratio): 1.9 at 4/27/2024  2:35 AM  Age at listing (hypothetical): 38 years  Sex: Female at 4/27/2024  7:47 AM      Continue chronic meds. Etiology likely ETOH. Will avoid any hepatotoxic meds, and monitor CBC/CMP/INR for synthetic function.     For plan please see decompensated cirrhosis    Coagulopathy  Pt with hx of alcohol use disorder and recently diagnosed alcohol liver cirrhosis. Lab work remarkable for elevated INR and Plts 42, PT 25.8 INR 2.5 Fibrinogen 105.  - S/p 2 units prbc, 1 unit plts, and 1 unit cryo on admission    Plan  - Plts goal > 50K  - CBC q6 hrs  - Daily PT/INR    Hyponatremia  Patient has hyponatremia which is uncontrolled,We will aim to correct the sodium by 4-6mEq in 24 hours. We will monitor sodium Daily. The hyponatremia is due to Cirrhosis. We will obtain the following studies: Urine sodium, urine osmolality, serum osmolality. We will treat the hyponatremia with Fluid restriction of:  1.5 liter per day. The patient's sodium results have been reviewed and are listed below.  Recent Labs   Lab 04/27/24  0747        Improving with fluid restriction and low sodium diet.   Midodrine, octreotide, albumin infusion   Nephrology now signed off  Improved    Alcohol use disorder  Pt with hx of alcohol use disorder presenting to [facility] with acute on chronic liver failure. Reports last drink was on 04/19/24. Also has a hx of alcohol withdrawal with no seizure activity.      Alcohol Withdrawal precautions:  - Vitals q4h while awake  - CIWA monitoring  - Lorazepam PRN if 2 criteria are met: Systolic BP>160, Diastolic BP >110 or Pulse >110  - Start Vitamin supplementation- Thiamine, Folic acid, Vit. B12, and Multivitamin qd  RESOLVED        VTE Risk Mitigation (From admission, onward)           Ordered     IP VTE LOW RISK PATIENT  Once         04/20/24 1347     Place sequential compression device  Until discontinued         04/20/24 1347                    Discharge Planning   KARSON: 4/28/2024     Code Status: Full Code   Is the patient medically ready for discharge?:     Reason for patient still in hospital (select all that apply): Patient trending condition  Discharge Plan A: Home with family                  Yessica Garcia DO  Department of Hospital Medicine   [facility] - Telemetry Stepdown

## 2024-04-27 NOTE — PLAN OF CARE
Problem: Physical Therapy  Goal: Physical Therapy Goal  Description: Goals to be met by: 24     Patient will increase functional independence with mobility by performin. Supine to sit with [place]  2. Sit to stand transfer with [place]  3. Bed to chair transfer with [place] using No Assistive Device  4. Gait  x 150 feet with Supervision using No Assistive Device.   5. Ascend/descend 3 stair with no Handrails Contact Guard Assistance using No Assistive Device.     Outcome: Progressing  PT eval completed and goals established. Pt will begin PT POC, progressing as tolerated.

## 2024-04-27 NOTE — ASSESSMENT & PLAN NOTE
Pt presenting to [facility] for acute on chronic liver failure. Has a history of alcohol use disorder. Physical exam remarkable for right CVA tenderness, jaundice, sclera icterus. Reports having melena on her stool. Hgb 6.9 and plts 42.     CTA negative for any intraabdominal bleeding.  Cirrhotic configuration liver with stigmata of portal venous hypertension and moderate volume abdominopelvic ascites. Relatively diffuse colonic wall thickening with submucosal edema could relate to portal colopathy. Unable to perform EGD due to respiratory status.     Plan  - GI consulted, appreciate recs  - Transfuse for Hgb <7, unless otherwise indicated  - Maintain IV access with 2 large bore Ivs  - Intravascular resuscitation/support with IVFs   - Hold all NSAIDs and anticoagulants, unless contraindicated  - Bolus IV pantoprazole 80mg followed by 40mg BID  - Octreotide and CTX now discontinued  - Please correct any coagulopathy with platelets and FFP for goal of platelets >50K and INR <2.0  - EGD cancelled due to respiratory status  - No further reports of bleeding  RESOLVED

## 2024-04-27 NOTE — ASSESSMENT & PLAN NOTE
Pt with hx of alcohol use disorder presenting to [facility] with acute on chronic liver failure. Reports last drink was on 04/19/24. Also has a hx of alcohol withdrawal with no seizure activity.     Alcohol Withdrawal precautions:  - Vitals q4h while awake  - CIWA monitoring  - Lorazepam PRN if 2 criteria are met: Systolic BP>160, Diastolic BP >110 or Pulse >110  - Start Vitamin supplementation- Thiamine, Folic acid, Vit. B12, and Multivitamin qd  RESOLVED

## 2024-04-27 NOTE — SUBJECTIVE & OBJECTIVE
Interval History: No events overnight. Patient reports that she has not increased UOP with 40mg lasix. Will trial 80mg today and assess for response in UOP as well as oxygenation. Lung sounds clear.     Review of Systems  Objective:     Vital Signs (Most Recent):  Temp: 99.2 °F (37.3 °C) (04/27/24 1147)  Pulse: 86 (04/27/24 1147)  Resp: 17 (04/27/24 1147)  BP: (!) 101/52 (04/27/24 1147)  SpO2: 95 % (04/27/24 1147) Vital Signs (24h Range):  Temp:  [98.1 °F (36.7 °C)-99.2 °F (37.3 °C)] 99.2 °F (37.3 °C)  Pulse:  [76-95] 86  Resp:  [16-19] 17  SpO2:  [92 %-99 %] 95 %  BP: (100-120)/(52-61) 101/52     Weight: 71.7 kg (158 lb)  Body mass index is 25.5 kg/m².    Intake/Output Summary (Last 24 hours) at 4/27/2024 1418  Last data filed at 4/27/2024 0800  Gross per 24 hour   Intake 300 ml   Output --   Net 300 ml         Physical Exam  Vitals and nursing note reviewed.   Constitutional:       General: She is not in acute distress.     Appearance: She is ill-appearing.   HENT:      Head: Normocephalic and atraumatic.   Eyes:      General: Scleral icterus present.      Extraocular Movements: Extraocular movements intact.   Cardiovascular:      Rate and Rhythm: Normal rate and regular rhythm.      Heart sounds: Normal heart sounds. No murmur heard.  Pulmonary:      Effort: Pulmonary effort is normal. No respiratory distress.      Breath sounds: Normal breath sounds. No wheezing.      Comments: Crackles on LLL, improved; on 1 L NC  Abdominal:      General: There is distension.      Palpations: Abdomen is soft.      Tenderness: There is no abdominal tenderness. There is no guarding or rebound.      Comments: No asterixis   Skin:     Coloration: Skin is jaundiced.   Neurological:      General: No focal deficit present.      Mental Status: She is alert and oriented to person, place, and time.             Significant Labs: All pertinent labs within the past 24 hours have been reviewed.    Significant Imaging: I have reviewed all  pertinent imaging results/findings within the past 24 hours.

## 2024-04-27 NOTE — ASSESSMENT & PLAN NOTE
Pt with a hx of alcohol use disorder presenting from OSH for complains of jaundice. She drinks 48 oz of vodka daily, however has been trying to taper down. Has a history of alcohol withdrawal in the past, but no seizures. As reported per transfer note lab work at OSH was remarkable for  Hb/Hct 7.4/21.0, INR 2.57, Na 120, K 2.5, Mg 0.7. Tbili of 24. . Hep studies negative. CT A/P shows cirrhosis and portal HTN, gastric diffuse bowel wall thickening and jose-pancreatic fat stranding. Physical exam remarkable for sclera icterus, jaundice, asterixis and abdominal distention. US abdomen did not showed a viable pocket for paracentesis.   - US liver showing ascites, Reversal of flow of the main portal, right portal, and left portal veins, which can be seen with liver disease. Hepatomegaly with heterogeneous echotexture.   - CTA negative for any intraabdominal bleeding.  Cirrhotic configuration liver with stigmata of portal venous hypertension and moderate volume abdominopelvic ascites. Relatively diffuse colonic wall thickening with submucosal edema could relate to portal colopathy.  - Hepatology consulted. Not a candidate for liver transplant at this time given active alcohol use    Plan  - Completed CTX for empiric SBP coverage  - PPI for stress ppx  - Octreotide discontinued, monitoring for bleed  - Continue lactulose for goal 3-4 BMs per day   - Unable to find pocket for paracentesis   - Addiction psych consulted  - Not a candidate for liver transplant  - Amenable to palliative   - Goal: stable, compensated, see palliative in [place]

## 2024-04-27 NOTE — ASSESSMENT & PLAN NOTE
Patient with known Cirrhosis with Child's class C. Co-morbidities are present and inclusive of portal hypertension and anemia/pancytopenia.  MELD-Na score calculated; MELD 3.0: 28 at 4/27/2024  7:47 AM  MELD-Na: 25 at 4/27/2024  7:47 AM  Calculated from:  Serum Creatinine: 0.8 mg/dL (Using min of 1 mg/dL) at 4/27/2024  7:47 AM  Serum Sodium: 136 mmol/L at 4/27/2024  7:47 AM  Total Bilirubin: 15.8 mg/dL at 4/27/2024  2:35 AM  Serum Albumin: 2.1 g/dL at 4/27/2024  2:35 AM  INR(ratio): 1.9 at 4/27/2024  2:35 AM  Age at listing (hypothetical): 38 years  Sex: Female at 4/27/2024  7:47 AM      Continue chronic meds. Etiology likely ETOH. Will avoid any hepatotoxic meds, and monitor CBC/CMP/INR for synthetic function.     For plan please see decompensated cirrhosis

## 2024-04-27 NOTE — ASSESSMENT & PLAN NOTE
Patient has hyponatremia which is uncontrolled,We will aim to correct the sodium by 4-6mEq in 24 hours. We will monitor sodium Daily. The hyponatremia is due to Cirrhosis. We will obtain the following studies: Urine sodium, urine osmolality, serum osmolality. We will treat the hyponatremia with Fluid restriction of:  1.5 liter per day. The patient's sodium results have been reviewed and are listed below.  Recent Labs   Lab 04/27/24  0747        Improving with fluid restriction and low sodium diet.   Midodrine, octreotide, albumin infusion   Nephrology now signed off  Improved

## 2024-04-28 LAB
ABO + RH BLD: NORMAL
ALBUMIN SERPL BCP-MCNC: 2.1 G/DL (ref 3.5–5.2)
ALP SERPL-CCNC: 115 U/L (ref 55–135)
ALT SERPL W/O P-5'-P-CCNC: 30 U/L (ref 10–44)
ANION GAP SERPL CALC-SCNC: 11 MMOL/L (ref 8–16)
ANISOCYTOSIS BLD QL SMEAR: SLIGHT
AST SERPL-CCNC: 78 U/L (ref 10–40)
BASOPHILS # BLD AUTO: 0.07 K/UL (ref 0–0.2)
BASOPHILS NFR BLD: 1.2 % (ref 0–1.9)
BILIRUB DIRECT SERPL-MCNC: 7.7 MG/DL (ref 0.1–0.3)
BILIRUB SERPL-MCNC: 14.6 MG/DL (ref 0.1–1)
BLD GP AB SCN CELLS X3 SERPL QL: NORMAL
BUN SERPL-MCNC: 10 MG/DL (ref 6–20)
BURR CELLS BLD QL SMEAR: ABNORMAL
CALCIUM SERPL-MCNC: 8.1 MG/DL (ref 8.7–10.5)
CHLORIDE SERPL-SCNC: 97 MMOL/L (ref 95–110)
CO2 SERPL-SCNC: 30 MMOL/L (ref 23–29)
CREAT SERPL-MCNC: 0.8 MG/DL (ref 0.5–1.4)
DIFFERENTIAL METHOD BLD: ABNORMAL
EOSINOPHIL # BLD AUTO: 0.1 K/UL (ref 0–0.5)
EOSINOPHIL NFR BLD: 1.9 % (ref 0–8)
ERYTHROCYTE [DISTWIDTH] IN BLOOD BY AUTOMATED COUNT: ABNORMAL % (ref 11.5–14.5)
ERYTHROCYTE [DISTWIDTH] IN BLOOD BY AUTOMATED COUNT: ABNORMAL % (ref 11.5–14.5)
EST. GFR  (NO RACE VARIABLE): >60 ML/MIN/1.73 M^2
GLUCOSE SERPL-MCNC: 83 MG/DL (ref 70–110)
HCT VFR BLD AUTO: 20 % (ref 37–48.5)
HCT VFR BLD AUTO: 25.5 % (ref 37–48.5)
HGB BLD-MCNC: 6.8 G/DL (ref 12–16)
HGB BLD-MCNC: 8.4 G/DL (ref 12–16)
IMM GRANULOCYTES # BLD AUTO: 0.21 K/UL (ref 0–0.04)
IMM GRANULOCYTES NFR BLD AUTO: 3.6 % (ref 0–0.5)
INR PPP: 1.9 (ref 0.8–1.2)
LYMPHOCYTES # BLD AUTO: 1.4 K/UL (ref 1–4.8)
LYMPHOCYTES NFR BLD: 24.2 % (ref 18–48)
MCH RBC QN AUTO: 37.5 PG (ref 27–31)
MCH RBC QN AUTO: 38.2 PG (ref 27–31)
MCHC RBC AUTO-ENTMCNC: 32.9 G/DL (ref 32–36)
MCHC RBC AUTO-ENTMCNC: 34 G/DL (ref 32–36)
MCV RBC AUTO: 112 FL (ref 82–98)
MCV RBC AUTO: 114 FL (ref 82–98)
MONOCYTES # BLD AUTO: 1.3 K/UL (ref 0.3–1)
MONOCYTES NFR BLD: 22.7 % (ref 4–15)
NEUTROPHILS # BLD AUTO: 2.7 K/UL (ref 1.8–7.7)
NEUTROPHILS NFR BLD: 46.4 % (ref 38–73)
NRBC BLD-RTO: 0 /100 WBC
OVALOCYTES BLD QL SMEAR: ABNORMAL
PLATELET # BLD AUTO: 40 K/UL (ref 150–450)
PLATELET # BLD AUTO: 45 K/UL (ref 150–450)
PMV BLD AUTO: 10.6 FL (ref 9.2–12.9)
PMV BLD AUTO: 10.9 FL (ref 9.2–12.9)
POIKILOCYTOSIS BLD QL SMEAR: SLIGHT
POLYCHROMASIA BLD QL SMEAR: ABNORMAL
POTASSIUM SERPL-SCNC: 3.1 MMOL/L (ref 3.5–5.1)
PROT SERPL-MCNC: 4.7 G/DL (ref 6–8.4)
PROTHROMBIN TIME: 20.1 SEC (ref 9–12.5)
RBC # BLD AUTO: 1.78 M/UL (ref 4–5.4)
RBC # BLD AUTO: 2.24 M/UL (ref 4–5.4)
SODIUM SERPL-SCNC: 138 MMOL/L (ref 136–145)
SPECIMEN OUTDATE: NORMAL
SPHEROCYTES BLD QL SMEAR: ABNORMAL
WBC # BLD AUTO: 5.87 K/UL (ref 3.9–12.7)
WBC # BLD AUTO: 6.76 K/UL (ref 3.9–12.7)

## 2024-04-28 PROCEDURE — 25000003 PHARM REV CODE 250: Performed by: STUDENT IN AN ORGANIZED HEALTH CARE EDUCATION/TRAINING PROGRAM

## 2024-04-28 PROCEDURE — 25000003 PHARM REV CODE 250

## 2024-04-28 PROCEDURE — 85610 PROTHROMBIN TIME: CPT | Performed by: STUDENT IN AN ORGANIZED HEALTH CARE EDUCATION/TRAINING PROGRAM

## 2024-04-28 PROCEDURE — 80048 BASIC METABOLIC PNL TOTAL CA: CPT

## 2024-04-28 PROCEDURE — 36415 COLL VENOUS BLD VENIPUNCTURE: CPT | Performed by: STUDENT IN AN ORGANIZED HEALTH CARE EDUCATION/TRAINING PROGRAM

## 2024-04-28 PROCEDURE — 20600001 HC STEP DOWN PRIVATE ROOM

## 2024-04-28 PROCEDURE — 86901 BLOOD TYPING SEROLOGIC RH(D): CPT | Performed by: FAMILY MEDICINE

## 2024-04-28 PROCEDURE — C9113 INJ PANTOPRAZOLE SODIUM, VIA: HCPCS

## 2024-04-28 PROCEDURE — 36415 COLL VENOUS BLD VENIPUNCTURE: CPT

## 2024-04-28 PROCEDURE — S4991 NICOTINE PATCH NONLEGEND: HCPCS

## 2024-04-28 PROCEDURE — 63600175 PHARM REV CODE 636 W HCPCS

## 2024-04-28 PROCEDURE — 85025 COMPLETE CBC W/AUTO DIFF WBC: CPT

## 2024-04-28 PROCEDURE — 63600175 PHARM REV CODE 636 W HCPCS: Performed by: FAMILY MEDICINE

## 2024-04-28 PROCEDURE — 25000003 PHARM REV CODE 250: Performed by: FAMILY MEDICINE

## 2024-04-28 PROCEDURE — 80076 HEPATIC FUNCTION PANEL: CPT | Performed by: STUDENT IN AN ORGANIZED HEALTH CARE EDUCATION/TRAINING PROGRAM

## 2024-04-28 PROCEDURE — 85027 COMPLETE CBC AUTOMATED: CPT | Performed by: FAMILY MEDICINE

## 2024-04-28 RX ORDER — TRAMADOL HYDROCHLORIDE 50 MG/1
50 TABLET ORAL ONCE
Status: COMPLETED | OUTPATIENT
Start: 2024-04-28 | End: 2024-04-28

## 2024-04-28 RX ORDER — HYDROXYZINE HYDROCHLORIDE 25 MG/1
25 TABLET, FILM COATED ORAL ONCE
Status: COMPLETED | OUTPATIENT
Start: 2024-04-28 | End: 2024-04-28

## 2024-04-28 RX ORDER — BUMETANIDE 0.25 MG/ML
1 INJECTION INTRAMUSCULAR; INTRAVENOUS ONCE
Status: COMPLETED | OUTPATIENT
Start: 2024-04-28 | End: 2024-04-28

## 2024-04-28 RX ORDER — POTASSIUM CHLORIDE 20 MEQ/1
20 TABLET, EXTENDED RELEASE ORAL ONCE
Status: COMPLETED | OUTPATIENT
Start: 2024-04-28 | End: 2024-04-28

## 2024-04-28 RX ORDER — POTASSIUM CHLORIDE 20 MEQ/1
40 TABLET, EXTENDED RELEASE ORAL 3 TIMES DAILY
Status: COMPLETED | OUTPATIENT
Start: 2024-04-28 | End: 2024-04-28

## 2024-04-28 RX ADMIN — LACTULOSE 15 G: 20 SOLUTION ORAL at 09:04

## 2024-04-28 RX ADMIN — HYDROMORPHONE HYDROCHLORIDE 2 MG: 2 TABLET ORAL at 09:04

## 2024-04-28 RX ADMIN — NICOTINE 1 PATCH: 14 PATCH, EXTENDED RELEASE TRANSDERMAL at 09:04

## 2024-04-28 RX ADMIN — LACTULOSE 15 G: 20 SOLUTION ORAL at 03:04

## 2024-04-28 RX ADMIN — LACTULOSE 15 G: 20 SOLUTION ORAL at 08:04

## 2024-04-28 RX ADMIN — FOLIC ACID 1 MG: 1 TABLET ORAL at 09:04

## 2024-04-28 RX ADMIN — POTASSIUM CHLORIDE 40 MEQ: 1500 TABLET, EXTENDED RELEASE ORAL at 03:04

## 2024-04-28 RX ADMIN — POTASSIUM CHLORIDE 20 MEQ: 1500 TABLET, EXTENDED RELEASE ORAL at 06:04

## 2024-04-28 RX ADMIN — THIAMINE HCL TAB 100 MG 100 MG: 100 TAB at 09:04

## 2024-04-28 RX ADMIN — BUMETANIDE 1 MG: 0.25 INJECTION INTRAMUSCULAR; INTRAVENOUS at 06:04

## 2024-04-28 RX ADMIN — MIDODRINE HYDROCHLORIDE 10 MG: 5 TABLET ORAL at 08:04

## 2024-04-28 RX ADMIN — TRAMADOL HYDROCHLORIDE 50 MG: 50 TABLET, COATED ORAL at 11:04

## 2024-04-28 RX ADMIN — POTASSIUM CHLORIDE 40 MEQ: 1500 TABLET, EXTENDED RELEASE ORAL at 11:04

## 2024-04-28 RX ADMIN — THERA TABS 1 TABLET: TAB at 09:04

## 2024-04-28 RX ADMIN — MIDODRINE HYDROCHLORIDE 10 MG: 5 TABLET ORAL at 03:04

## 2024-04-28 RX ADMIN — HYDROXYZINE HYDROCHLORIDE 25 MG: 25 TABLET, FILM COATED ORAL at 05:04

## 2024-04-28 RX ADMIN — HYDROMORPHONE HYDROCHLORIDE 2 MG: 2 TABLET ORAL at 08:04

## 2024-04-28 RX ADMIN — PANTOPRAZOLE SODIUM 40 MG: 40 INJECTION, POWDER, FOR SOLUTION INTRAVENOUS at 09:04

## 2024-04-28 RX ADMIN — MIDODRINE HYDROCHLORIDE 10 MG: 5 TABLET ORAL at 09:04

## 2024-04-28 NOTE — SUBJECTIVE & OBJECTIVE
Interval History: medically ready for discharge. Will need home oxygen.     Review of Systems  Objective:     Vital Signs (Most Recent):  Temp: 98.5 °F (36.9 °C) (04/28/24 1217)  Pulse: 88 (04/28/24 1218)  Resp: 17 (04/28/24 1217)  BP: (!) 102/48 (04/28/24 1217)  SpO2: (!) 92 % (04/28/24 1218) Vital Signs (24h Range):  Temp:  [98.2 °F (36.8 °C)-99.7 °F (37.6 °C)] 98.5 °F (36.9 °C)  Pulse:  [76-88] 88  Resp:  [15-19] 17  SpO2:  [81 %-98 %] 92 %  BP: ()/(45-62) 102/48     Weight: 71.7 kg (158 lb)  Body mass index is 25.5 kg/m².    Intake/Output Summary (Last 24 hours) at 4/28/2024 1513  Last data filed at 4/28/2024 0501  Gross per 24 hour   Intake --   Output 900 ml   Net -900 ml         Physical Exam  Constitutional:       General: She is not in acute distress.     Appearance: She is ill-appearing.   HENT:      Head: Normocephalic and atraumatic.   Eyes:      General: Scleral icterus present.      Extraocular Movements: Extraocular movements intact.   Cardiovascular:      Rate and Rhythm: Normal rate and regular rhythm.   Pulmonary:      Effort: No respiratory distress.      Breath sounds: No wheezing.      Comments: Crackles on LLL , on 2 L NC  Abdominal:      General: There is distension.      Palpations: Abdomen is soft.      Tenderness: There is no abdominal tenderness. There is no guarding or rebound.      Comments: No asterixis   Skin:     Coloration: Skin is jaundiced.   Neurological:      Mental Status: She is alert and oriented to person, place, and time.             Significant Labs: All pertinent labs within the past 24 hours have been reviewed.    Significant Imaging: I have reviewed all pertinent imaging results/findings within the past 24 hours.

## 2024-04-28 NOTE — PLAN OF CARE
Problem: Adult Inpatient Plan of Care  Goal: Absence of Hospital-Acquired Illness or Injury  Outcome: Progressing  Goal: Optimal Comfort and Wellbeing  Outcome: Progressing  Goal: Readiness for Transition of Care  Outcome: Progressing     Problem: Fluid and Electrolyte Imbalance (Acute Kidney Injury/Impairment)  Goal: Fluid and Electrolyte Balance  Outcome: Progressing     Resting quietly in room talking on phone, breathing even and un-labored, safety checks done, no acute distress noted at this time.

## 2024-04-28 NOTE — PROGRESS NOTES
[facility] - Telemetry [facility] Medicine  Progress Note    Patient Name: Carola Tovar  MRN: [id number]  Patient Class: IP- Inpatient   Admission Date: 4/20/2024  Length of Stay: 8 days  Attending Physician: Chinmay Dawn III*  Primary Care Provider: Roro, Primary Doctor        Subjective:     Principal Problem:Decompensated hepatic cirrhosis        HPI:  Ms. Tovar is a 39 yo F with alcohol abuse disorder who presented to OSH ED with jaundice. Pt is originally from [place], but have spent the last 1.5 months in [place] visiting a friend. For the past month she has started noticing yellow discoloration in her eyes and skin. Pt reports that she used to drink 48 oz of vodka daily, but for the past week she has taper down to only 16 oz per day. Last drink was yesterday 04/19. She also reports multiple episodes of withdrawal in the past, but no seizure episodes. She also report dysuria and increased urinary frequency. Denies headaches, visual changes, SOB, cough, chest pain, palpitation, nausea, vomiting, abdominal pain, diarrhea, constipation.     At the OSH ED the patient was afebrile tachycardic to 120 with /42. Lab work remarkable for Hb/Hct 7.4/21.0, INR 2.57, Na 120, K 2.5, Mg 0.7. Tbili of 24. U/A nitrite positive. Given IV CTX, Mg and K replacements. Hep studies negative. CT A/P shows cirrhosis and portal HTN, gastric diffuse bowel wall thickening and jose-pancreatic fat stranding. Pt was transferred to [facility] [place] higher level of care. Pt admitted to hospital medicine for further management of acute on chronic liver failure and hepatology was consulted.     Overview/Hospital Course:  Pt admitted to hospital medicine for decompensated hepatic cirrhosis 2/2 to alcohol use complicated by HE and melena. MELD was 31 during admission. GI and Hepatology consulted. Hgb 6.9 pt received 2 units of PRBC. Plts low and elevated INR and PT, cryo, plts and vit k given. Low calcium, calcium gluconate given. US  liver showing ascites, Reversal of flow of the main portal, right portal, and left portal veins, which can be seen with liver disease. Hepatomegaly with heterogeneous echotexture. CTA negative for any intraabdominal bleeding.  Cirrhotic configuration liver with stigmata of portal venous hypertension and moderate volume abdominopelvic ascites. Relatively diffuse colonic wall thickening with submucosal edema could relate to portal colopathy. Sodium trending down to 118, pt AOX4. Critical care consulted, pt stable to continue under hospital medicine services. Initiated on octreotide, midodrine, and albumin. Hepatology consulted for transplant evaluation, deemed not a candidate. Treated with prn ativan for alcohol withdrawal. Developed worsening PB concerning for HRS, nephrology consulted. Patient did not tolerate albumin challenge which made HRS less likely. Patient increased oxygen requirements night to 30L HFNC from 2L NC. MICU recommended continued diuresis. Oxygen requirements improved with spot diuresis Lasix 40 x2, however she continued to have 1-2L O2 requirement. Hepatology did not think she was a candidate for liver transplant due to her active alcohol use despite knowing of her liver disease. Her EGD was delayed on 4/23 due to worsening oxygen requirements overnight. She has not had further bleeding episodes, thus EGD then deferred. Azithromycin added for atypical PNA 4/25 due to cough that started before her admission. RIP was negative. Unable to find a pocket for paracentesis. She completed SBP ppx. Patient was agreeable to follow with palliative in [place]. Her support system includes her boyfriend and 4 children. She will likely need hospice in the future. Evaluated by PT/OT that recommended a shower chair on discharge. She did fail a six minute walk test and qualifies for home O2 use, working with CM to find a solution in order to safely be able to discharge to home with O2      Interval History:  medically ready for discharge. Will need home oxygen.     Review of Systems  Objective:     Vital Signs (Most Recent):  Temp: 98.5 °F (36.9 °C) (04/28/24 1217)  Pulse: 88 (04/28/24 1218)  Resp: 17 (04/28/24 1217)  BP: (!) 102/48 (04/28/24 1217)  SpO2: (!) 92 % (04/28/24 1218) Vital Signs (24h Range):  Temp:  [98.2 °F (36.8 °C)-99.7 °F (37.6 °C)] 98.5 °F (36.9 °C)  Pulse:  [76-88] 88  Resp:  [15-19] 17  SpO2:  [81 %-98 %] 92 %  BP: ()/(45-62) 102/48     Weight: 71.7 kg (158 lb)  Body mass index is 25.5 kg/m².    Intake/Output Summary (Last 24 hours) at 4/28/2024 1513  Last data filed at 4/28/2024 0501  Gross per 24 hour   Intake --   Output 900 ml   Net -900 ml         Physical Exam  Constitutional:       General: She is not in acute distress.     Appearance: She is ill-appearing.   HENT:      Head: Normocephalic and atraumatic.   Eyes:      General: Scleral icterus present.      Extraocular Movements: Extraocular movements intact.   Cardiovascular:      Rate and Rhythm: Normal rate and regular rhythm.   Pulmonary:      Effort: No respiratory distress.      Breath sounds: No wheezing.      Comments: Crackles on LLL , on 2 L NC  Abdominal:      General: There is distension.      Palpations: Abdomen is soft.      Tenderness: There is no abdominal tenderness. There is no guarding or rebound.      Comments: No asterixis   Skin:     Coloration: Skin is jaundiced.   Neurological:      Mental Status: She is alert and oriented to person, place, and time.             Significant Labs: All pertinent labs within the past 24 hours have been reviewed.    Significant Imaging: I have reviewed all pertinent imaging results/findings within the past 24 hours.    Assessment/Plan:      * Decompensated hepatic cirrhosis  Pt with a hx of alcohol use disorder presenting from OSH for complains of jaundice. She drinks 48 oz of vodka daily, however has been trying to taper down. Has a history of alcohol withdrawal in the past, but no  seizures. As reported per transfer note lab work at OSH was remarkable for  Hb/Hct 7.4/21.0, INR 2.57, Na 120, K 2.5, Mg 0.7. Tbili of 24. . Hep studies negative. CT A/P shows cirrhosis and portal HTN, gastric diffuse bowel wall thickening and jose-pancreatic fat stranding. Physical exam remarkable for sclera icterus, jaundice, asterixis and abdominal distention. US abdomen did not showed a viable pocket for paracentesis.   - US liver showing ascites, Reversal of flow of the main portal, right portal, and left portal veins, which can be seen with liver disease. Hepatomegaly with heterogeneous echotexture.   - CTA negative for any intraabdominal bleeding.  Cirrhotic configuration liver with stigmata of portal venous hypertension and moderate volume abdominopelvic ascites. Relatively diffuse colonic wall thickening with submucosal edema could relate to portal colopathy.  - Hepatology consulted. Not a candidate for liver transplant at this time given active alcohol use    Plan  - Completed CTX for empiric SBP coverage  - PPI for stress ppx  - Octreotide discontinued, monitoring for bleed  - Continue lactulose for goal 3-4 BMs per day   - Unable to find pocket for paracentesis   - Addiction psych consulted  - Not a candidate for liver transplant  - Amenable to palliative   - Goal: stable, compensated, see palliative in [place]       Acute hypoxemic respiratory failure  Acute respiratory status due to pleural effusion seen on CXR and URI (coughing before admission).    -RIP negative  -Spot diuresis as needed  -O2 requirements improving  - Will likely need to d/c with home O2 if she does not improve with further diuresis; working with CM to find suitable discharge option        UGIB (upper gastrointestinal bleed)  Pt [place] [facility] [place] acute on chronic liver failure. Has a history of alcohol use disorder. Physical exam remarkable for right CVA tenderness, jaundice, sclera icterus. Reports having melena on her  stool. Hgb 6.9 and plts 42.     CTA negative for any intraabdominal bleeding.  Cirrhotic configuration liver with stigmata of portal venous hypertension and moderate volume abdominopelvic ascites. Relatively diffuse colonic wall thickening with submucosal edema could relate to portal colopathy. Unable to perform EGD due to respiratory status.     Plan  - GI consulted, appreciate recs  - Transfuse for Hgb <7, unless otherwise indicated  - Maintain IV access with 2 large bore Ivs  - Intravascular resuscitation/support with IVFs   - Hold all NSAIDs and anticoagulants, unless contraindicated  - Bolus IV pantoprazole 80mg followed by 40mg BID  - Octreotide and CTX now discontinued  - Please correct any coagulopathy with platelets and FFP for goal of platelets >50K and INR <2.0  - EGD cancelled due to respiratory status  - No further reports of bleeding  RESOLVED    Acute cystitis  Pt presenting from OSH for concerns of liver failure. Pt reports dysuria and urinary frequency. UA at OSH remarkable for nitrite positive. Given IV CTX.    Plan  - UA and urine culture  - continue rocephin   RESOLVED      Acute blood loss anemia  Patient's anemia is currently uncontrolled. Has not received any PRBCs to date. Etiology likely d/t chronic disease due to Chronic liver disease  Current CBC reviewed-   Lab Results   Component Value Date    HGB 7.5 (L) 04/27/2024    HCT 22.2 (L) 04/27/2024     - s/p 2 PRBC  - Type and screen   - Transfusing one unit of blood  - Monitor serial CBC and transfuse if patient becomes hemodynamically unstable, symptomatic or H/H drops below 7/21.  - No further reports of bleeding, EGD discontinued  RESOLVED      Alcoholic cirrhosis  Patient with known Cirrhosis with Child's class C. Co-morbidities are present and inclusive of portal hypertension and anemia/pancytopenia.  MELD-Na score calculated; MELD 3.0: 28 at 4/27/2024  7:47 AM  MELD-Na: 25 at 4/27/2024  7:47 AM  Calculated from:  Serum Creatinine: 0.8  mg/dL (Using min of 1 mg/dL) at 4/27/2024  7:47 AM  Serum Sodium: 136 mmol/L at 4/27/2024  7:47 AM  Total Bilirubin: 15.8 mg/dL at 4/27/2024  2:35 AM  Serum Albumin: 2.1 g/dL at 4/27/2024  2:35 AM  INR(ratio): 1.9 at 4/27/2024  2:35 AM  Age at listing (hypothetical): 38 years  Sex: Female at 4/27/2024  7:47 AM      Continue chronic meds. Etiology likely ETOH. Will avoid any hepatotoxic meds, and monitor CBC/CMP/INR for synthetic function.     For plan please see decompensated cirrhosis    Coagulopathy  Pt with hx of alcohol use disorder and recently diagnosed alcohol liver cirrhosis. Lab work remarkable for elevated INR and Plts 42, PT 25.8 INR 2.5 Fibrinogen 105.  - S/p 2 units prbc, 1 unit plts, and 1 unit cryo on admission    Plan  - Plts goal > 50K  - CBC q6 hrs  - Daily PT/INR    Hyponatremia  Patient has hyponatremia which is uncontrolled,We will aim to correct the sodium by 4-6mEq in 24 hours. We will monitor sodium Daily. The hyponatremia is due to Cirrhosis. We will obtain the following studies: Urine sodium, urine osmolality, serum osmolality. We will treat the hyponatremia with Fluid restriction of:  1.5 liter per day. The patient's sodium results have been reviewed and are listed below.  Recent Labs   Lab 04/27/24  0747        Improving with fluid restriction and low sodium diet.   Midodrine, octreotide, albumin infusion   Nephrology now signed off  Improved    Alcohol use disorder  Pt with hx of alcohol use disorder presenting to [facility] with acute on chronic liver failure. Reports last drink was on 04/19/24. Also has a hx of alcohol withdrawal with no seizure activity.     Alcohol Withdrawal precautions:  - Vitals q4h while awake  - CIWA monitoring  - Lorazepam PRN if 2 criteria are met: Systolic BP>160, Diastolic BP >110 or Pulse >110  - Start Vitamin supplementation- Thiamine, Folic acid, Vit. B12, and Multivitamin qd  RESOLVED        VTE Risk Mitigation (From admission, onward)           Ordered      IP VTE LOW RISK PATIENT  Once         04/20/24 1347     Place sequential compression device  Until discontinued         04/20/24 1347                    Discharge Planning   KARSON: 4/28/2024     Code Status: Full Code   Is the patient medically ready for discharge?:     Reason for patient still in hospital (select all that apply): Pending disposition  Discharge Plan A: Home with family                  Sakina Delong DO  Department of Hospital Medicine   [facility] - Telemetry Stepdown

## 2024-04-29 LAB
ALBUMIN SERPL BCP-MCNC: 2.1 G/DL (ref 3.5–5.2)
ALP SERPL-CCNC: 115 U/L (ref 55–135)
ALT SERPL W/O P-5'-P-CCNC: 28 U/L (ref 10–44)
ANION GAP SERPL CALC-SCNC: 10 MMOL/L (ref 8–16)
ANION GAP SERPL CALC-SCNC: 10 MMOL/L (ref 8–16)
ANISOCYTOSIS BLD QL SMEAR: SLIGHT
AST SERPL-CCNC: 85 U/L (ref 10–40)
BACTERIA BLD CULT: NORMAL
BACTERIA BLD CULT: NORMAL
BASOPHILS # BLD AUTO: 0.07 K/UL (ref 0–0.2)
BASOPHILS NFR BLD: 1.1 % (ref 0–1.9)
BILIRUB SERPL-MCNC: 14.2 MG/DL (ref 0.1–1)
BUN SERPL-MCNC: 7 MG/DL (ref 6–20)
BUN SERPL-MCNC: 7 MG/DL (ref 6–20)
CALCIUM SERPL-MCNC: 8.1 MG/DL (ref 8.7–10.5)
CALCIUM SERPL-MCNC: 8.2 MG/DL (ref 8.7–10.5)
CHLORIDE SERPL-SCNC: 100 MMOL/L (ref 95–110)
CHLORIDE SERPL-SCNC: 99 MMOL/L (ref 95–110)
CO2 SERPL-SCNC: 28 MMOL/L (ref 23–29)
CO2 SERPL-SCNC: 29 MMOL/L (ref 23–29)
CREAT SERPL-MCNC: 0.8 MG/DL (ref 0.5–1.4)
CREAT SERPL-MCNC: 0.9 MG/DL (ref 0.5–1.4)
DIFFERENTIAL METHOD BLD: ABNORMAL
EOSINOPHIL # BLD AUTO: 0.1 K/UL (ref 0–0.5)
EOSINOPHIL NFR BLD: 2 % (ref 0–8)
ERYTHROCYTE [DISTWIDTH] IN BLOOD BY AUTOMATED COUNT: ABNORMAL % (ref 11.5–14.5)
EST. GFR  (NO RACE VARIABLE): >60 ML/MIN/1.73 M^2
EST. GFR  (NO RACE VARIABLE): >60 ML/MIN/1.73 M^2
GLUCOSE SERPL-MCNC: 106 MG/DL (ref 70–110)
GLUCOSE SERPL-MCNC: 78 MG/DL (ref 70–110)
HCT VFR BLD AUTO: 22.7 % (ref 37–48.5)
HGB BLD-MCNC: 7.8 G/DL (ref 12–16)
HYPOCHROMIA BLD QL SMEAR: ABNORMAL
IMM GRANULOCYTES # BLD AUTO: 0.16 K/UL (ref 0–0.04)
IMM GRANULOCYTES NFR BLD AUTO: 2.5 % (ref 0–0.5)
LYMPHOCYTES # BLD AUTO: 1.2 K/UL (ref 1–4.8)
LYMPHOCYTES NFR BLD: 19.1 % (ref 18–48)
MAGNESIUM SERPL-MCNC: 0.9 MG/DL (ref 1.6–2.6)
MAGNESIUM SERPL-MCNC: 2.1 MG/DL (ref 1.6–2.6)
MCH RBC QN AUTO: 39.2 PG (ref 27–31)
MCHC RBC AUTO-ENTMCNC: 34.4 G/DL (ref 32–36)
MCV RBC AUTO: 114 FL (ref 82–98)
MONOCYTES # BLD AUTO: 1.5 K/UL (ref 0.3–1)
MONOCYTES NFR BLD: 22.7 % (ref 4–15)
NEUTROPHILS # BLD AUTO: 3.4 K/UL (ref 1.8–7.7)
NEUTROPHILS NFR BLD: 52.6 % (ref 38–73)
NRBC BLD-RTO: 0 /100 WBC
PLATELET # BLD AUTO: 42 K/UL (ref 150–450)
PLATELET BLD QL SMEAR: ABNORMAL
PMV BLD AUTO: 10.9 FL (ref 9.2–12.9)
POIKILOCYTOSIS BLD QL SMEAR: SLIGHT
POLYCHROMASIA BLD QL SMEAR: ABNORMAL
POTASSIUM SERPL-SCNC: 3.5 MMOL/L (ref 3.5–5.1)
POTASSIUM SERPL-SCNC: 3.8 MMOL/L (ref 3.5–5.1)
PROT SERPL-MCNC: 4.7 G/DL (ref 6–8.4)
RBC # BLD AUTO: 1.99 M/UL (ref 4–5.4)
SODIUM SERPL-SCNC: 138 MMOL/L (ref 136–145)
SODIUM SERPL-SCNC: 138 MMOL/L (ref 136–145)
WBC # BLD AUTO: 6.43 K/UL (ref 3.9–12.7)

## 2024-04-29 PROCEDURE — 85025 COMPLETE CBC W/AUTO DIFF WBC: CPT

## 2024-04-29 PROCEDURE — 99900035 HC TECH TIME PER 15 MIN (STAT)

## 2024-04-29 PROCEDURE — 27000221 HC OXYGEN, UP TO 24 HOURS

## 2024-04-29 PROCEDURE — 83735 ASSAY OF MAGNESIUM: CPT | Mod: 91

## 2024-04-29 PROCEDURE — 63600175 PHARM REV CODE 636 W HCPCS

## 2024-04-29 PROCEDURE — 25000003 PHARM REV CODE 250

## 2024-04-29 PROCEDURE — 97535 SELF CARE MNGMENT TRAINING: CPT | Mod: CO

## 2024-04-29 PROCEDURE — 97116 GAIT TRAINING THERAPY: CPT

## 2024-04-29 PROCEDURE — 36415 COLL VENOUS BLD VENIPUNCTURE: CPT

## 2024-04-29 PROCEDURE — 83735 ASSAY OF MAGNESIUM: CPT | Performed by: FAMILY MEDICINE

## 2024-04-29 PROCEDURE — 36415 COLL VENOUS BLD VENIPUNCTURE: CPT | Performed by: FAMILY MEDICINE

## 2024-04-29 PROCEDURE — 20600001 HC STEP DOWN PRIVATE ROOM

## 2024-04-29 PROCEDURE — 80048 BASIC METABOLIC PNL TOTAL CA: CPT | Mod: XB

## 2024-04-29 PROCEDURE — 25000003 PHARM REV CODE 250: Performed by: STUDENT IN AN ORGANIZED HEALTH CARE EDUCATION/TRAINING PROGRAM

## 2024-04-29 PROCEDURE — S4991 NICOTINE PATCH NONLEGEND: HCPCS

## 2024-04-29 PROCEDURE — 94799 UNLISTED PULMONARY SVC/PX: CPT

## 2024-04-29 PROCEDURE — 94761 N-INVAS EAR/PLS OXIMETRY MLT: CPT

## 2024-04-29 PROCEDURE — 80053 COMPREHEN METABOLIC PANEL: CPT | Performed by: FAMILY MEDICINE

## 2024-04-29 PROCEDURE — 97530 THERAPEUTIC ACTIVITIES: CPT | Mod: CO

## 2024-04-29 PROCEDURE — C9113 INJ PANTOPRAZOLE SODIUM, VIA: HCPCS

## 2024-04-29 RX ORDER — PANTOPRAZOLE SODIUM 40 MG/1
40 TABLET, DELAYED RELEASE ORAL DAILY
Status: DISCONTINUED | OUTPATIENT
Start: 2024-04-30 | End: 2024-05-02 | Stop reason: HOSPADM

## 2024-04-29 RX ORDER — HYDROXYZINE PAMOATE 25 MG/1
25 CAPSULE ORAL EVERY 8 HOURS PRN
Status: DISCONTINUED | OUTPATIENT
Start: 2024-04-29 | End: 2024-04-30

## 2024-04-29 RX ORDER — BUMETANIDE 1 MG/1
2 TABLET ORAL DAILY
Status: DISCONTINUED | OUTPATIENT
Start: 2024-04-29 | End: 2024-04-30

## 2024-04-29 RX ORDER — HYDROXYZINE HYDROCHLORIDE 25 MG/1
25 TABLET, FILM COATED ORAL ONCE
Status: COMPLETED | OUTPATIENT
Start: 2024-04-29 | End: 2024-04-29

## 2024-04-29 RX ORDER — MAGNESIUM SULFATE HEPTAHYDRATE 40 MG/ML
2 INJECTION, SOLUTION INTRAVENOUS
Qty: 200 ML | Refills: 0 | Status: DISCONTINUED | OUTPATIENT
Start: 2024-04-29 | End: 2024-04-29

## 2024-04-29 RX ADMIN — BUMETANIDE 2 MG: 1 TABLET ORAL at 11:04

## 2024-04-29 RX ADMIN — MIDODRINE HYDROCHLORIDE 10 MG: 5 TABLET ORAL at 03:04

## 2024-04-29 RX ADMIN — MIDODRINE HYDROCHLORIDE 10 MG: 5 TABLET ORAL at 08:04

## 2024-04-29 RX ADMIN — HYDROMORPHONE HYDROCHLORIDE 2 MG: 2 TABLET ORAL at 08:04

## 2024-04-29 RX ADMIN — HYDROXYZINE HYDROCHLORIDE 25 MG: 25 TABLET, FILM COATED ORAL at 04:04

## 2024-04-29 RX ADMIN — LACTULOSE 15 G: 20 SOLUTION ORAL at 08:04

## 2024-04-29 RX ADMIN — NICOTINE 1 PATCH: 14 PATCH, EXTENDED RELEASE TRANSDERMAL at 08:04

## 2024-04-29 RX ADMIN — THERA TABS 1 TABLET: TAB at 08:04

## 2024-04-29 RX ADMIN — HYDROXYZINE PAMOATE 25 MG: 25 CAPSULE ORAL at 01:04

## 2024-04-29 RX ADMIN — THIAMINE HCL TAB 100 MG 100 MG: 100 TAB at 08:04

## 2024-04-29 RX ADMIN — LACTULOSE 15 G: 20 SOLUTION ORAL at 03:04

## 2024-04-29 RX ADMIN — FOLIC ACID 1 MG: 1 TABLET ORAL at 08:04

## 2024-04-29 RX ADMIN — MAGNESIUM SULFATE HEPTAHYDRATE 2 G: 40 INJECTION, SOLUTION INTRAVENOUS at 08:04

## 2024-04-29 RX ADMIN — PANTOPRAZOLE SODIUM 40 MG: 40 INJECTION, POWDER, FOR SOLUTION INTRAVENOUS at 08:04

## 2024-04-29 RX ADMIN — MAGNESIUM SULFATE HEPTAHYDRATE 2 G: 40 INJECTION, SOLUTION INTRAVENOUS at 06:04

## 2024-04-29 NOTE — PLAN OF CARE
Problem: Adult Inpatient Plan of Care  Goal: Plan of Care Review  Outcome: Progressing  Goal: Patient-Specific Goal (Individualized)  Outcome: Progressing  Goal: Absence of Hospital-Acquired Illness or Injury  Outcome: Progressing  Goal: Optimal Comfort and Wellbeing  Outcome: Progressing  Goal: Readiness for Transition of Care  Outcome: Progressing     Problem: Fluid and Electrolyte Imbalance (Acute Kidney Injury/Impairment)  Goal: Fluid and Electrolyte Balance  Outcome: Progressing     Problem: Oral Intake Inadequate (Acute Kidney Injury/Impairment)  Goal: Optimal Nutrition Intake  Outcome: Progressing     Problem: Renal Function Impairment (Acute Kidney Injury/Impairment)  Goal: Effective Renal Function  Outcome: Progressing     Problem: Skin Injury Risk Increased  Goal: Skin Health and Integrity  Outcome: Progressing     Pt remained free of falls or injuries during shift. No signs of acute distress noted during shift. Pt remains in bed with the call light in reach and the bed alarm on.

## 2024-04-29 NOTE — PROGRESS NOTES
[facility] - Telemetry [facility] Medicine  Progress Note    Patient Name: Carola Tovar  MRN: [id number]  Patient Class: IP- Inpatient   Admission Date: 4/20/2024  Length of Stay: 9 days  Attending Physician: Chinmay Dawn III*  Primary Care Provider: Roro, Primary Doctor        Subjective:     Principal Problem:Decompensated hepatic cirrhosis        HPI:  Ms. Tovar is a 37 yo F with alcohol abuse disorder who presented to OSH ED with jaundice. Pt is originally from [place], but have spent the last 1.5 months in [place] visiting a friend. For the past month she has started noticing yellow discoloration in her eyes and skin. Pt reports that she used to drink 48 oz of vodka daily, but for the past week she has taper down to only 16 oz per day. Last drink was yesterday 04/19. She also reports multiple episodes of withdrawal in the past, but no seizure episodes. She also report dysuria and increased urinary frequency. Denies headaches, visual changes, SOB, cough, chest pain, palpitation, nausea, vomiting, abdominal pain, diarrhea, constipation.     At the OSH ED the patient was afebrile tachycardic to 120 with /42. Lab work remarkable for Hb/Hct 7.4/21.0, INR 2.57, Na 120, K 2.5, Mg 0.7. Tbili of 24. U/A nitrite positive. Given IV CTX, Mg and K replacements. Hep studies negative. CT A/P shows cirrhosis and portal HTN, gastric diffuse bowel wall thickening and jose-pancreatic fat stranding. Pt was transferred to [facility] for higher level of care. Pt admitted to hospital medicine for further management of acute on chronic liver failure and hepatology was consulted.     Overview/Hospital Course:  Pt admitted to hospital medicine for decompensated hepatic cirrhosis 2/2 to alcohol use complicated by HE and melena. MELD was 31 during admission. GI and Hepatology consulted. Hgb 6.9 pt received 2 units of PRBC. Plts low and elevated INR and PT, cryo, plts and vit k given. Low calcium, calcium gluconate given. US  liver showing ascites, Reversal of flow of the main portal, right portal, and left portal veins, which can be seen with liver disease. Hepatomegaly with heterogeneous echotexture. CTA negative for any intraabdominal bleeding.  Cirrhotic configuration liver with stigmata of portal venous hypertension and moderate volume abdominopelvic ascites. Relatively diffuse colonic wall thickening with submucosal edema could relate to portal colopathy. Sodium trending down to 118, pt AOX4. Critical care consulted, pt stable to continue under hospital medicine services. Initiated on octreotide, midodrine, and albumin. Hepatology consulted for transplant evaluation, deemed not a candidate. Treated with prn ativan for alcohol withdrawal. Developed worsening PB concerning for HRS, nephrology consulted. Patient did not tolerate albumin challenge which made HRS less likely. Patient increased oxygen requirements night to 30L HFNC from 2L NC. MICU recommended continued diuresis. Oxygen requirements improved with spot diuresis Lasix 40 x2, however she continued to have 1-2L O2 requirement. Hepatology did not think she was a candidate for liver transplant due to her active alcohol use despite knowing of her liver disease. Her EGD was delayed on 4/23 due to worsening oxygen requirements overnight. She has not had further bleeding episodes, thus EGD then deferred. Azithromycin added for atypical PNA 4/25 due to cough that started before her admission. RIP was negative. Unable to find a pocket for paracentesis. She completed SBP ppx. Patient was agreeable to follow with palliative in [place]. Her support system includes her boyfriend and 4 children. She will likely need hospice in the future. Evaluated by PT/OT that recommended a shower chair on discharge. She did fail a six minute walk test and qualifies for home O2 use, working with CM to find a solution in order to safely be able to discharge to home with O2      Interval History:  She requires O2 on discharge which has been a barrier due to insurance issues. Incentive spirometer ordered. Bumex given for additional diuresis.     Review of Systems  Objective:     Vital Signs (Most Recent):  Temp: 98.7 °F (37.1 °C) (04/29/24 1115)  Pulse: 79 (04/29/24 1116)  Resp: 18 (04/29/24 1115)  BP: (!) 95/54 (04/29/24 1116)  SpO2: (!) 93 % (04/29/24 1116) Vital Signs (24h Range):  Temp:  [98.4 °F (36.9 °C)-99.8 °F (37.7 °C)] 98.7 °F (37.1 °C)  Pulse:  [73-82] 79  Resp:  [16-20] 18  SpO2:  [92 %-96 %] 93 %  BP: ()/(46-64) 95/54     Weight: 71.7 kg (158 lb)  Body mass index is 25.5 kg/m².    Intake/Output Summary (Last 24 hours) at 4/29/2024 1321  Last data filed at 4/29/2024 0519  Gross per 24 hour   Intake --   Output 1200 ml   Net -1200 ml         Physical Exam  Constitutional:       General: She is not in acute distress.     Appearance: She is ill-appearing.   HENT:      Head: Normocephalic and atraumatic.   Eyes:      General: Scleral icterus present.      Extraocular Movements: Extraocular movements intact.   Cardiovascular:      Rate and Rhythm: Normal rate and regular rhythm.   Pulmonary:      Effort: No respiratory distress.      Breath sounds: No wheezing.      Comments: Crackles on LLL , on 2 L NC  Abdominal:      General: There is distension.      Palpations: Abdomen is soft.      Tenderness: There is no abdominal tenderness. There is no guarding or rebound.      Comments: No asterixis   Skin:     Coloration: Skin is jaundiced.   Neurological:      Mental Status: She is alert and oriented to person, place, and time.             Significant Labs: All pertinent labs within the past 24 hours have been reviewed.    Significant Imaging: I have reviewed all pertinent imaging results/findings within the past 24 hours.    Assessment/Plan:      * Decompensated hepatic cirrhosis  Pt with a hx of alcohol use disorder presenting from OSH for complains of jaundice. She drinks 48 oz of vodka daily,  however has been trying to taper down. Has a history of alcohol withdrawal in the past, but no seizures. As reported per transfer note lab work at OSH was remarkable for  Hb/Hct 7.4/21.0, INR 2.57, Na 120, K 2.5, Mg 0.7. Tbili of 24. . Hep studies negative. CT A/P shows cirrhosis and portal HTN, gastric diffuse bowel wall thickening and jose-pancreatic fat stranding. Physical exam remarkable for sclera icterus, jaundice, asterixis and abdominal distention. US abdomen did not showed a viable pocket for paracentesis.   - US liver showing ascites, Reversal of flow of the main portal, right portal, and left portal veins, which can be seen with liver disease. Hepatomegaly with heterogeneous echotexture.   - CTA negative for any intraabdominal bleeding.  Cirrhotic configuration liver with stigmata of portal venous hypertension and moderate volume abdominopelvic ascites. Relatively diffuse colonic wall thickening with submucosal edema could relate to portal colopathy.  - Hepatology consulted. Not a candidate for liver transplant at this time given active alcohol use    Plan  - Completed CTX for empiric SBP coverage  - PPI for stress ppx  - Octreotide discontinued, monitoring for bleed  - Continue lactulose for goal 3-4 BMs per day   - Unable to find pocket for paracentesis   - Addiction psych consulted  - Not a candidate for liver transplant  - Amenable to palliative   - Goal: stable, compensated, see palliative in [place]       Acute hypoxemic respiratory failure  Acute respiratory status due to pleural effusion seen on CXR and URI (coughing before admission).    -RIP negative  -Spot diuresis as needed  -O2 requirements improving  - Will likely need to d/c with home O2 if she does not improve with further diuresis; working with CM to find suitable discharge option        UGIB (upper gastrointestinal bleed)  Pt presenting to [facility] for acute on chronic liver failure. Has a history of alcohol use disorder. Physical exam  remarkable for right CVA tenderness, jaundice, sclera icterus. Reports having melena on her stool. Hgb 6.9 and plts 42.     CTA negative for any intraabdominal bleeding.  Cirrhotic configuration liver with stigmata of portal venous hypertension and moderate volume abdominopelvic ascites. Relatively diffuse colonic wall thickening with submucosal edema could relate to portal colopathy. Unable to perform EGD due to respiratory status.     Plan  - GI consulted, appreciate recs  - Transfuse for Hgb <7, unless otherwise indicated  - Maintain IV access with 2 large bore Ivs  - Intravascular resuscitation/support with IVFs   - Hold all NSAIDs and anticoagulants, unless contraindicated  - Bolus IV pantoprazole 80mg followed by 40mg BID  - Octreotide and CTX now discontinued  - Please correct any coagulopathy with platelets and FFP for goal of platelets >50K and INR <2.0  - EGD cancelled due to respiratory status  - No further reports of bleeding  RESOLVED    Acute cystitis  Pt presenting from OSH for concerns of liver failure. Pt reports dysuria and urinary frequency. UA at OSH remarkable for nitrite positive. Given IV CTX.    Plan  - UA and urine culture  - continue rocephin   RESOLVED      Acute blood loss anemia  Patient's anemia is currently uncontrolled. Has not received any PRBCs to date. Etiology likely d/t chronic disease due to Chronic liver disease  Current CBC reviewed-   Lab Results   Component Value Date    HGB 7.5 (L) 04/27/2024    HCT 22.2 (L) 04/27/2024     - s/p 2 PRBC  - Type and screen   - Transfusing one unit of blood  - Monitor serial CBC and transfuse if patient becomes hemodynamically unstable, symptomatic or H/H drops below 7/21.  - No further reports of bleeding, EGD discontinued  RESOLVED      Alcoholic cirrhosis  Patient with known Cirrhosis with Child's class C. Co-morbidities are present and inclusive of portal hypertension and anemia/pancytopenia.  MELD-Na score calculated; MELD 3.0: 28 at  4/27/2024  7:47 AM  MELD-Na: 25 at 4/27/2024  7:47 AM  Calculated from:  Serum Creatinine: 0.8 mg/dL (Using min of 1 mg/dL) at 4/27/2024  7:47 AM  Serum Sodium: 136 mmol/L at 4/27/2024  7:47 AM  Total Bilirubin: 15.8 mg/dL at 4/27/2024  2:35 AM  Serum Albumin: 2.1 g/dL at 4/27/2024  2:35 AM  INR(ratio): 1.9 at 4/27/2024  2:35 AM  Age at listing (hypothetical): 38 years  Sex: Female at 4/27/2024  7:47 AM      Continue chronic meds. Etiology likely ETOH. Will avoid any hepatotoxic meds, and monitor CBC/CMP/INR for synthetic function.     For plan please see decompensated cirrhosis    Coagulopathy  Pt with hx of alcohol use disorder and recently diagnosed alcohol liver cirrhosis. Lab work remarkable for elevated INR and Plts 42, PT 25.8 INR 2.5 Fibrinogen 105.  - S/p 2 units prbc, 1 unit plts, and 1 unit cryo on admission    Plan  - Plts goal > 50K  - CBC q6 hrs  - Daily PT/INR    Hyponatremia  Patient has hyponatremia which is uncontrolled,We will aim to correct the sodium by 4-6mEq in 24 hours. We will monitor sodium Daily. The hyponatremia is due to Cirrhosis. We will obtain the following studies: Urine sodium, urine osmolality, serum osmolality. We will treat the hyponatremia with Fluid restriction of:  1.5 liter per day. The patient's sodium results have been reviewed and are listed below.  Recent Labs   Lab 04/27/24  0747        Improving with fluid restriction and low sodium diet.   Midodrine, octreotide, albumin infusion   Nephrology now signed off  Improved    Alcohol use disorder  Pt with hx of alcohol use disorder presenting [place] [facility] with acute on chronic liver failure. Reports last drink was on 04/19/24. Also has a hx of alcohol withdrawal with no seizure activity.     Alcohol Withdrawal precautions:  - Vitals q4h while awake  - CIWA monitoring  - Lorazepam PRN if 2 criteria are met: Systolic BP>160, Diastolic BP >110 or Pulse >110  - Start Vitamin supplementation- Thiamine, Folic acid, Vit. B12, and  Multivitamin qd  RESOLVED        VTE Risk Mitigation (From admission, onward)           Ordered     IP VTE LOW RISK PATIENT  Once         04/20/24 1347     Place sequential compression device  Until discontinued         04/20/24 1347                    Discharge Planning   KARSON: 4/30/2024     Code Status: Full Code   Is the patient medically ready for discharge?:     Reason for patient still in hospital (select all that apply): Pending disposition  Discharge Plan A: Home with family                  Sakina Delong DO  Department of Hospital Medicine   [facility] - Telemetry Stepdown

## 2024-04-29 NOTE — PT/OT/SLP PROGRESS
Occupational Therapy   Treatment  A client care conference was completed by the OTR and the BARBA prior to treatment by the OTR to discuss the patient's POC and current status.     Name: Carola Tovar  MRN: [id number]  Admitting Diagnosis:  Decompensated hepatic cirrhosis  6 Days Post-Op    Recommendations:     Discharge Recommendations: Low Intensity Therapy  Discharge Equipment Recommendations:  none  Barriers to discharge:  None    Assessment:     Carola Tovar is a 38 y.o. female with a medical diagnosis of Decompensated hepatic cirrhosis.  She presents with the following performance deficits affecting function are weakness, impaired endurance, impaired cardiopulmonary response to activity, gait instability, decreased safety awareness.   Pt pleasant and demonstrated good participation during tx session this date. Pt able to complete ADLs this session with with very minimal to no assistance required. Functional mobility performed with no AD and O2 sats decreased to 78% and then increased to >93% once seated. Nurse notified of pt O2 sats during functional mobility. Pt verbalized no s/s of dizziness or appeared in distress during tx session.     Rehab Prognosis:  Good; patient would benefit from acute skilled OT services to address these deficits and reach maximum level of function.       Plan:     Patient to be seen 3 x/week to address the above listed problems via self-care/home management, therapeutic activities, therapeutic exercises  Plan of Care Expires: 05/26/24  Plan of Care Reviewed with: patient    Subjective     Chief Complaint: Unable to go home due to oxygen  Patient/Family Comments/goals: Pt agreeable to tx session  Pain/Comfort:  Pain Rating 1: 0/10    Objective:     Communicated with: nurse (Yassine) prior to session.  Patient found right sidelying with telemetry, pulse ox (continuous), oxygen upon OT entry to room.    General Precautions: Standard, fall    Orthopedic Precautions:N/A  Braces: N/A  Respiratory  Status: Nasal cannula, flow 1 L/min     Occupational Performance:     Bed Mobility:    Patient completed Scooting/Bridging with stand by assistance  Patient completed Supine to Sit with stand by assistance     Functional Mobility/Transfers:  Patient completed Sit <> Stand Transfer with stand by assistance  with  no assistive device   Patient completed Toilet Transfer Step Transfer technique with supervision with  no AD  Functional Mobility: Pt performed in room mobility from (bed > toilet no AD SBA/supervision) to simulate household/community distances to improve safety, endurance, static/dynamic standing balance, and be able to perform occupations of choice.   Pt functionally ambulated ~100 ft x 2 trials with no AD and SBA in hallway to improve endurance and standing tolerance to safely perform occupations of choice  O2 sats monitored while in hallway and nurse gave permission to ambulate in hallway without O2; O2 sats decreased to 78% - 85% with no supplemental O2 and increased to >93% once seated in chair after ambulating    Activities of Daily Living:  Grooming: supervision and setup A  as pt completed brushing teeth and washed face seated EOB. Pt completed hand hygiene in standing with no AD after toileting.  Upper Body Dressing: minimum assistance to don gown like a robe to thread on R UE in standing with no AD  Lower Body Dressing: supervision as pt donned nonskid socks seated EOB   Toileting: supervision as pt completed voiding seated on toilet and gown management  Feedding: Lunch tray setup on bedside table as pt seated in chair      [facility] 6 Click ADL: 22    Treatment & Education:  Pt performed bed mobility, functional mobility/transfers, and ADLs as documented above.   Pt educated and instructed on the following:  OT POC  Role of BARBA  Use of call bell for all assistance  Addressed questions and /or concerns within BARBA scope of practice  Pt verbalized understanding     Patient left up in chair with all  lines intact, nurse (Yassine) notified, personal belongings within reach, and pt appears in NAD    GOALS:   Multidisciplinary Problems       Occupational Therapy Goals          Problem: Occupational Therapy    Goal Priority Disciplines Outcome Interventions   Occupational Therapy Goal     OT, PT/OT Progressing    Description: Goals to be met by: 5/10/2024     Patient will increase functional independence with ADLs by performing:    UE Dressing with [place].  LE Dressing with [place].  Grooming while standing at sink with [place].  Toileting from toilet with [place] for hygiene and clothing management.   Toilet transfer to toilet with [place].                         Time Tracking:     OT Date of Treatment: 04/29/24  OT Start Time: 1210  OT Stop Time: 1237  OT Total Time (min): 27 min    Billable Minutes:Self Care/Home Management 15  Therapeutic Activity 12    OT/SHAHID: SHAHID     Number of SHAHID visits since last OT visit: 1 4/29/2024

## 2024-04-29 NOTE — SUBJECTIVE & OBJECTIVE
Interval History: She requires O2 on discharge which has been a barrier due to insurance issues. Incentive spirometer ordered.     Review of Systems  Objective:     Vital Signs (Most Recent):  Temp: 98.7 °F (37.1 °C) (04/29/24 1115)  Pulse: 79 (04/29/24 1116)  Resp: 18 (04/29/24 1115)  BP: (!) 95/54 (04/29/24 1116)  SpO2: (!) 93 % (04/29/24 1116) Vital Signs (24h Range):  Temp:  [98.4 °F (36.9 °C)-99.8 °F (37.7 °C)] 98.7 °F (37.1 °C)  Pulse:  [73-82] 79  Resp:  [16-20] 18  SpO2:  [92 %-96 %] 93 %  BP: ()/(46-64) 95/54     Weight: 71.7 kg (158 lb)  Body mass index is 25.5 kg/m².    Intake/Output Summary (Last 24 hours) at 4/29/2024 1321  Last data filed at 4/29/2024 0519  Gross per 24 hour   Intake --   Output 1200 ml   Net -1200 ml         Physical Exam  Constitutional:       General: She is not in acute distress.     Appearance: She is ill-appearing.   HENT:      Head: Normocephalic and atraumatic.   Eyes:      General: Scleral icterus present.      Extraocular Movements: Extraocular movements intact.   Cardiovascular:      Rate and Rhythm: Normal rate and regular rhythm.   Pulmonary:      Effort: No respiratory distress.      Breath sounds: No wheezing.      Comments: Crackles on LLL , on 2 L NC  Abdominal:      General: There is distension.      Palpations: Abdomen is soft.      Tenderness: There is no abdominal tenderness. There is no guarding or rebound.      Comments: No asterixis   Skin:     Coloration: Skin is jaundiced.   Neurological:      Mental Status: She is alert and oriented to person, place, and time.             Significant Labs: All pertinent labs within the past 24 hours have been reviewed.    Significant Imaging: I have reviewed all pertinent imaging results/findings within the past 24 hours.

## 2024-04-29 NOTE — NURSING
Notified DO Baljeet that pt stated that her abdomen appears to be more distended. DO Baljeet stated that the best option is to continue diuresing the pt. No new orders given. Pt remains in the bed with the call light in reach and the bed alarm on.

## 2024-04-29 NOTE — PT/OT/SLP PROGRESS
Physical Therapy Treatment    Patient Name:  Carola Tovar   MRN:  [id number]    Recommendations:     Discharge Recommendations: Low Intensity Therapy  Discharge Equipment Recommendations: none  Barriers to discharge: None    Assessment:     Carola Tovar is a 38 y.o. female admitted with a medical diagnosis of Decompensated hepatic cirrhosis.  She presents with the following impairments/functional limitations: weakness, impaired endurance, impaired functional mobility, decreased coordination, impaired cardiopulmonary response to activity Patient agreeable to therapy session this date, with good progression of completing stair negotiation. Oxygen saturation monitored with movement, decreased to 86% at one moment with quickly returning to 95% during standing rest break with pursed lip breathing. Patient will continue to benefit from skilled PT during this admit to address BLE strength and endurance deficits, and maximize independence with functional mobility.    Rehab Prognosis: Good; patient would benefit from acute skilled PT services to address these deficits and reach maximum level of function.    Recent Surgery: Procedure(s) (LRB):  EGD (ESOPHAGOGASTRODUODENOSCOPY) (N/A) 6 Days Post-Op    Plan:     During this hospitalization, patient to be seen 3 x/week to address the identified rehab impairments via gait training, therapeutic activities, therapeutic exercises, neuromuscular re-education and progress toward the following goals:    Plan of Care Expires:  05/27/24    Subjective     Chief Complaint: fatigue  Patient/Family Comments/goals: discharge  Pain/Comfort:  Pain Rating 1: 0/10  Pain Rating Post-Intervention 1: 0/10      Objective:     Communicated with RN prior to session.  Patient found right sidelying with telemetry, pulse ox (continuous), oxygen upon PT entry to room.     General Precautions: Standard, fall  Orthopedic Precautions: N/A  Braces: N/A  Respiratory Status: Nasal cannula, flow 1 L/min      Functional Mobility:  Bed Mobility:     Scooting: independence  Supine to Sit: independence  Sit to Supine: independence  Transfers:     Sit to Stand:  stand by assistance with no AD  Gait: 120 ft no AD and SBA, with standing rest breaks to assess oxygen saturation, cueing for pursed lip breathing, multidirectional minimal sway noted, no LOB, including stair negotiation  Balance: sitting: independence, standing: SBA  Stairs:  Pt ascended/descended 3 stair(s) with No Assistive Device with left handrail with Contact Guard Assistance. Step-to stair pattern noted with cueing for safety to simulate home environment      AM-PAC 6 CLICK MOBILITY  Turning over in bed (including adjusting bedclothes, sheets and blankets)?: 4  Sitting down on and standing up from a chair with arms (e.g., wheelchair, bedside commode, etc.): 4  Moving from lying on back to sitting on the side of the bed?: 4  Moving to and from a bed to a chair (including a wheelchair)?: 4  Need to walk in hospital room?: 3  Climbing 3-5 steps with a railing?: 3  Basic Mobility Total Score: 22       Treatment & Education:  Patient educated on current level of function and progression towards therapeutic goals.  Patient educated on calling for assistance for any needs to improve overall safety awareness.  O2 saturation monitored    Patient left HOB elevated with all lines intact, call button in reach, and RN notified..    GOALS:   Multidisciplinary Problems       Physical Therapy Goals          Problem: Physical Therapy    Goal Priority Disciplines Outcome Goal Variances Interventions   Physical Therapy Goal     PT, PT/OT Progressing     Description: Goals to be met by: 24     Patient will increase functional independence with mobility by performin. Supine to sit with [place]  2. Sit to stand transfer with [place]  3. Bed to chair transfer with [place] using No Assistive Device  4. Gait  x 150 feet with Supervision using No Assistive  Device.   5. Ascend/descend 3 stair with no Handrails Contact Guard Assistance using No Assistive Device.                          Time Tracking:     PT Received On: 04/29/24  PT Start Time: 1607     PT Stop Time: 1632  PT Total Time (min): 25 min     Billable Minutes: Gait Training 25    Treatment Type: Treatment  PT/PTA: PT     Number of PTA visits since last PT visit: 0     04/29/2024

## 2024-04-30 LAB
ALBUMIN SERPL BCP-MCNC: 2.1 G/DL (ref 3.5–5.2)
ALP SERPL-CCNC: 107 U/L (ref 55–135)
ALT SERPL W/O P-5'-P-CCNC: 26 U/L (ref 10–44)
ANION GAP SERPL CALC-SCNC: 10 MMOL/L (ref 8–16)
AST SERPL-CCNC: 84 U/L (ref 10–40)
BASOPHILS # BLD AUTO: 0.06 K/UL (ref 0–0.2)
BASOPHILS NFR BLD: 1 % (ref 0–1.9)
BILIRUB SERPL-MCNC: 13.8 MG/DL (ref 0.1–1)
BUN SERPL-MCNC: 6 MG/DL (ref 6–20)
CALCIUM SERPL-MCNC: 8 MG/DL (ref 8.7–10.5)
CHLORIDE SERPL-SCNC: 96 MMOL/L (ref 95–110)
CO2 SERPL-SCNC: 30 MMOL/L (ref 23–29)
CREAT SERPL-MCNC: 0.8 MG/DL (ref 0.5–1.4)
DIFFERENTIAL METHOD BLD: ABNORMAL
EOSINOPHIL # BLD AUTO: 0.1 K/UL (ref 0–0.5)
EOSINOPHIL NFR BLD: 1.9 % (ref 0–8)
ERYTHROCYTE [DISTWIDTH] IN BLOOD BY AUTOMATED COUNT: 24.6 % (ref 11.5–14.5)
EST. GFR  (NO RACE VARIABLE): >60 ML/MIN/1.73 M^2
GLUCOSE SERPL-MCNC: 70 MG/DL (ref 70–110)
HCT VFR BLD AUTO: 21.4 % (ref 37–48.5)
HGB BLD-MCNC: 7.3 G/DL (ref 12–16)
IMM GRANULOCYTES # BLD AUTO: 0.11 K/UL (ref 0–0.04)
IMM GRANULOCYTES NFR BLD AUTO: 1.8 % (ref 0–0.5)
LYMPHOCYTES # BLD AUTO: 1.3 K/UL (ref 1–4.8)
LYMPHOCYTES NFR BLD: 19.9 % (ref 18–48)
MAGNESIUM SERPL-MCNC: 1.3 MG/DL (ref 1.6–2.6)
MCH RBC QN AUTO: 38.6 PG (ref 27–31)
MCHC RBC AUTO-ENTMCNC: 34.1 G/DL (ref 32–36)
MCV RBC AUTO: 113 FL (ref 82–98)
MONOCYTES # BLD AUTO: 1.4 K/UL (ref 0.3–1)
MONOCYTES NFR BLD: 22.8 % (ref 4–15)
NEUTROPHILS # BLD AUTO: 3.3 K/UL (ref 1.8–7.7)
NEUTROPHILS NFR BLD: 52.6 % (ref 38–73)
NRBC BLD-RTO: 0 /100 WBC
PLATELET # BLD AUTO: 41 K/UL (ref 150–450)
PMV BLD AUTO: 10.9 FL (ref 9.2–12.9)
POTASSIUM SERPL-SCNC: 3.3 MMOL/L (ref 3.5–5.1)
PROT SERPL-MCNC: 4.6 G/DL (ref 6–8.4)
RBC # BLD AUTO: 1.89 M/UL (ref 4–5.4)
SODIUM SERPL-SCNC: 136 MMOL/L (ref 136–145)
WBC # BLD AUTO: 6.27 K/UL (ref 3.9–12.7)

## 2024-04-30 PROCEDURE — 27000221 HC OXYGEN, UP TO 24 HOURS

## 2024-04-30 PROCEDURE — 85025 COMPLETE CBC W/AUTO DIFF WBC: CPT

## 2024-04-30 PROCEDURE — 94761 N-INVAS EAR/PLS OXIMETRY MLT: CPT

## 2024-04-30 PROCEDURE — 63600175 PHARM REV CODE 636 W HCPCS

## 2024-04-30 PROCEDURE — 25000003 PHARM REV CODE 250

## 2024-04-30 PROCEDURE — S4991 NICOTINE PATCH NONLEGEND: HCPCS

## 2024-04-30 PROCEDURE — 36415 COLL VENOUS BLD VENIPUNCTURE: CPT | Performed by: FAMILY MEDICINE

## 2024-04-30 PROCEDURE — 20600001 HC STEP DOWN PRIVATE ROOM

## 2024-04-30 PROCEDURE — 83735 ASSAY OF MAGNESIUM: CPT | Performed by: FAMILY MEDICINE

## 2024-04-30 PROCEDURE — 25000003 PHARM REV CODE 250: Performed by: FAMILY MEDICINE

## 2024-04-30 PROCEDURE — 80053 COMPREHEN METABOLIC PANEL: CPT | Performed by: FAMILY MEDICINE

## 2024-04-30 PROCEDURE — 25000003 PHARM REV CODE 250: Performed by: STUDENT IN AN ORGANIZED HEALTH CARE EDUCATION/TRAINING PROGRAM

## 2024-04-30 RX ORDER — TALC
6 POWDER (GRAM) TOPICAL NIGHTLY PRN
Status: DISCONTINUED | OUTPATIENT
Start: 2024-04-30 | End: 2024-04-30

## 2024-04-30 RX ORDER — MAGNESIUM SULFATE HEPTAHYDRATE 40 MG/ML
2 INJECTION, SOLUTION INTRAVENOUS ONCE
Status: COMPLETED | OUTPATIENT
Start: 2024-04-30 | End: 2024-04-30

## 2024-04-30 RX ORDER — TRAMADOL HYDROCHLORIDE 50 MG/1
50 TABLET ORAL ONCE
Status: COMPLETED | OUTPATIENT
Start: 2024-04-30 | End: 2024-04-30

## 2024-04-30 RX ORDER — HYDROXYZINE PAMOATE 25 MG/1
25 CAPSULE ORAL EVERY 8 HOURS PRN
Status: DISCONTINUED | OUTPATIENT
Start: 2024-04-30 | End: 2024-05-02 | Stop reason: HOSPADM

## 2024-04-30 RX ORDER — SPIRONOLACTONE 25 MG/1
25 TABLET ORAL DAILY
Status: DISCONTINUED | OUTPATIENT
Start: 2024-04-30 | End: 2024-05-02 | Stop reason: HOSPADM

## 2024-04-30 RX ORDER — FUROSEMIDE 10 MG/ML
80 INJECTION INTRAMUSCULAR; INTRAVENOUS EVERY 12 HOURS
Status: DISCONTINUED | OUTPATIENT
Start: 2024-04-30 | End: 2024-05-02 | Stop reason: HOSPADM

## 2024-04-30 RX ORDER — TALC
6 POWDER (GRAM) TOPICAL NIGHTLY PRN
Status: DISCONTINUED | OUTPATIENT
Start: 2024-04-30 | End: 2024-05-02 | Stop reason: HOSPADM

## 2024-04-30 RX ORDER — POTASSIUM CHLORIDE 20 MEQ/1
40 TABLET, EXTENDED RELEASE ORAL EVERY 6 HOURS
Status: COMPLETED | OUTPATIENT
Start: 2024-04-30 | End: 2024-04-30

## 2024-04-30 RX ADMIN — LACTULOSE 15 G: 20 SOLUTION ORAL at 08:04

## 2024-04-30 RX ADMIN — Medication 6 MG: at 01:04

## 2024-04-30 RX ADMIN — BUMETANIDE 2 MG: 1 TABLET ORAL at 08:04

## 2024-04-30 RX ADMIN — POTASSIUM CHLORIDE 40 MEQ: 1500 TABLET, EXTENDED RELEASE ORAL at 06:04

## 2024-04-30 RX ADMIN — POTASSIUM CHLORIDE 40 MEQ: 1500 TABLET, EXTENDED RELEASE ORAL at 09:04

## 2024-04-30 RX ADMIN — MIDODRINE HYDROCHLORIDE 10 MG: 5 TABLET ORAL at 02:04

## 2024-04-30 RX ADMIN — THIAMINE HCL TAB 100 MG 100 MG: 100 TAB at 08:04

## 2024-04-30 RX ADMIN — FUROSEMIDE 80 MG: 10 INJECTION, SOLUTION INTRAVENOUS at 12:04

## 2024-04-30 RX ADMIN — LACTULOSE 15 G: 20 SOLUTION ORAL at 02:04

## 2024-04-30 RX ADMIN — HYDROMORPHONE HYDROCHLORIDE 2 MG: 2 TABLET ORAL at 02:04

## 2024-04-30 RX ADMIN — TRAMADOL HYDROCHLORIDE 50 MG: 50 TABLET, COATED ORAL at 08:04

## 2024-04-30 RX ADMIN — HYDROXYZINE PAMOATE 25 MG: 25 CAPSULE ORAL at 08:04

## 2024-04-30 RX ADMIN — MIDODRINE HYDROCHLORIDE 10 MG: 5 TABLET ORAL at 08:04

## 2024-04-30 RX ADMIN — SPIRONOLACTONE 25 MG: 25 TABLET ORAL at 12:04

## 2024-04-30 RX ADMIN — NICOTINE 1 PATCH: 14 PATCH, EXTENDED RELEASE TRANSDERMAL at 08:04

## 2024-04-30 RX ADMIN — MAGNESIUM SULFATE HEPTAHYDRATE 2 G: 40 INJECTION, SOLUTION INTRAVENOUS at 09:04

## 2024-04-30 RX ADMIN — HYDROMORPHONE HYDROCHLORIDE 2 MG: 2 TABLET ORAL at 10:04

## 2024-04-30 RX ADMIN — FOLIC ACID 1 MG: 1 TABLET ORAL at 08:04

## 2024-04-30 RX ADMIN — THERA TABS 1 TABLET: TAB at 08:04

## 2024-04-30 RX ADMIN — PANTOPRAZOLE SODIUM 40 MG: 40 TABLET, DELAYED RELEASE ORAL at 08:04

## 2024-04-30 RX ADMIN — FUROSEMIDE 80 MG: 10 INJECTION, SOLUTION INTRAVENOUS at 08:04

## 2024-04-30 RX ADMIN — HYDROMORPHONE HYDROCHLORIDE 2 MG: 2 TABLET ORAL at 08:04

## 2024-04-30 RX ADMIN — TRAMADOL HYDROCHLORIDE 50 MG: 50 TABLET, COATED ORAL at 01:04

## 2024-04-30 NOTE — NURSING
Pt c/o pain in back mildly relieved by current PRN pain med and also difficulty sleeping. Med 5 on-call provider notified of these issues, New orders received, No acute distress noted at this time.

## 2024-04-30 NOTE — NURSING
Notified MD Gurpreet that pt stated she is experiencing vaginal bleeding despite being off her cycle. MD Gurpreet stated that someone will come by the bedside to assess the pt. Pt remains in bed with the call light in reach and the bed alarm on. No new orders given. Will report to oncoming shift nurse.

## 2024-04-30 NOTE — ASSESSMENT & PLAN NOTE
Acute respiratory status due to pleural effusion seen on CXR and URI (coughing before admission).    -RIP negative  -Spot diuresis as needed  -O2 requirements improving  - 6 MWT to assess for home O2

## 2024-04-30 NOTE — ASSESSMENT & PLAN NOTE
Pt with a hx of alcohol use disorder presenting from OSH for complains of jaundice. She drinks 48 oz of vodka daily, however has been trying to taper down. Has a history of alcohol withdrawal in the past, but no seizures. As reported per transfer note lab work at OSH was remarkable for  Hb/Hct 7.4/21.0, INR 2.57, Na 120, K 2.5, Mg 0.7. Tbili of 24. . Hep studies negative. CT A/P shows cirrhosis and portal HTN, gastric diffuse bowel wall thickening and jose-pancreatic fat stranding. Physical exam remarkable for sclera icterus, jaundice, asterixis and abdominal distention. US abdomen did not showed a viable pocket for paracentesis.   - US liver showing ascites, Reversal of flow of the main portal, right portal, and left portal veins, which can be seen with liver disease. Hepatomegaly with heterogeneous echotexture.   - CTA negative for any intraabdominal bleeding.  Cirrhotic configuration liver with stigmata of portal venous hypertension and moderate volume abdominopelvic ascites. Relatively diffuse colonic wall thickening with submucosal edema could relate to portal colopathy.  - Hepatology consulted. Not a candidate for liver transplant at this time given active alcohol use    Plan  - Completed CTX for empiric SBP coverage  - PPI for stress ppx  - Octreotide discontinued, monitoring for bleed  - Continue lactulose for goal 3-4 BMs per day   - Unable to find pocket for paracentesis x2  - Addiction psych consulted  - Not a candidate for liver transplant  - Amenable to palliative   - Goal: stable, compensated, see palliative in [place]

## 2024-04-30 NOTE — NURSING
Home Oxygen Evaluation    Date Performed: 4/30/2024    1) Patient's Home O2 Sat on room air, while at rest:88%        If O2 sats on room air at rest are 88% or below, patient qualifies. No additional testing needed. Document N/A in steps 2 and 3. If 89% or above, complete steps 2.      2) Patient's O2 Sat on room air while exercising: N/A        If O2 sats on room air while exercising remain 89% or above patient does not qualify, no further testing needed Document N/A in step 3. If O2 sats on room air while exercising are 88% or below, continue to step 3.      3) Patient's O2 Sat while exercising on O2: N/A at N/A LPM         (Must show improvement from #2 for patients to qualify)    If O2 sats improve on oxygen, patient qualifies for portable oxygen. If not, the patient does not qualify.

## 2024-04-30 NOTE — SUBJECTIVE & OBJECTIVE
Interval History: O2 requirements improved with spot diuresis yesterday. She was able to stand up without needing oxygen this morning. Encouraging incentive spirometer use.    Review of Systems  Objective:     Vital Signs (Most Recent):  Temp: 98.6 °F (37 °C) (04/30/24 0730)  Pulse: 78 (04/30/24 0730)  Resp: 18 (04/30/24 0823)  BP: (!) 113/57 (04/30/24 0823)  SpO2: (!) 94 % (04/30/24 0730) Vital Signs (24h Range):  Temp:  [98.6 °F (37 °C)-99.7 °F (37.6 °C)] 98.6 °F (37 °C)  Pulse:  [76-86] 78  Resp:  [16-20] 18  SpO2:  [93 %-97 %] 94 %  BP: ()/(46-69) 113/57     Weight: 71.7 kg (158 lb)  Body mass index is 25.5 kg/m².    Intake/Output Summary (Last 24 hours) at 4/30/2024 0835  Last data filed at 4/30/2024 0525  Gross per 24 hour   Intake --   Output 1000 ml   Net -1000 ml         Physical Exam  Constitutional:       General: She is not in acute distress.     Appearance: She is ill-appearing.   HENT:      Head: Normocephalic and atraumatic.   Eyes:      General: Scleral icterus present.      Extraocular Movements: Extraocular movements intact.   Cardiovascular:      Rate and Rhythm: Normal rate and regular rhythm.   Pulmonary:      Effort: No respiratory distress.      Breath sounds: No wheezing.      Comments: Crackles on LLL , on 2 L NC  Abdominal:      General: There is distension.      Palpations: Abdomen is soft.      Tenderness: There is no abdominal tenderness. There is no guarding or rebound.      Comments: No asterixis   Skin:     Coloration: Skin is jaundiced.   Neurological:      Mental Status: She is alert and oriented to person, place, and time.             Significant Labs: All pertinent labs within the past 24 hours have been reviewed.    Significant Imaging: I have reviewed all pertinent imaging results/findings within the past 24 hours.

## 2024-04-30 NOTE — PROGRESS NOTES
[facility] - Telemetry [facility] Medicine  Progress Note    Patient Name: Carola Tovar  MRN: [id number]  Patient Class: IP- Inpatient   Admission Date: 4/20/2024  Length of Stay: 10 days  Attending Physician: Chinmay Dawn III*  Primary Care Provider: Roro, Primary Doctor        Subjective:     Principal Problem:Decompensated hepatic cirrhosis        HPI:  Ms. Tovar is a 37 yo F with alcohol abuse disorder who presented to OSH ED with jaundice. Pt is originally from [place], but have spent the last 1.5 months in [place] visiting a friend. For the past month she has started noticing yellow discoloration in her eyes and skin. Pt reports that she used to drink 48 oz of vodka daily, but for the past week she has taper down to only 16 oz per day. Last drink was yesterday 04/19. She also reports multiple episodes of withdrawal in the past, but no seizure episodes. She also report dysuria and increased urinary frequency. Denies headaches, visual changes, SOB, cough, chest pain, palpitation, nausea, vomiting, abdominal pain, diarrhea, constipation.     At the OSH ED the patient was afebrile tachycardic to 120 with /42. Lab work remarkable for Hb/Hct 7.4/21.0, INR 2.57, Na 120, K 2.5, Mg 0.7. Tbili of 24. U/A nitrite positive. Given IV CTX, Mg and K replacements. Hep studies negative. CT A/P shows cirrhosis and portal HTN, gastric diffuse bowel wall thickening and jose-pancreatic fat stranding. Pt was transferred to [facility] for higher level of care. Pt admitted to hospital medicine for further management of acute on chronic liver failure and hepatology was consulted.     Overview/Hospital Course:  Pt admitted to hospital medicine for decompensated hepatic cirrhosis 2/2 to alcohol use complicated by HE and melena. MELD was 31 during admission. GI and Hepatology consulted. Hgb 6.9 pt received 2 units of PRBC. Plts low and elevated INR and PT, cryo, plts and vit k given. Low calcium, calcium gluconate given. US  liver showing ascites, Reversal of flow of the main portal, right portal, and left portal veins, which can be seen with liver disease. Hepatomegaly with heterogeneous echotexture. CTA negative for any intraabdominal bleeding.  Cirrhotic configuration liver with stigmata of portal venous hypertension and moderate volume abdominopelvic ascites. Relatively diffuse colonic wall thickening with submucosal edema could relate to portal colopathy. Sodium trending down to 118, pt AOX4. Critical care consulted, pt stable to continue under hospital medicine services. Initiated on octreotide, midodrine, and albumin. Hepatology consulted for transplant evaluation, deemed not a candidate. Treated with prn ativan for alcohol withdrawal. Developed worsening PB concerning for HRS, nephrology consulted. Patient did not tolerate albumin challenge which made HRS less likely. Patient increased oxygen requirements night to 30L HFNC from 2L NC. MICU recommended continued diuresis. Oxygen requirements improved with spot diuresis Lasix 40 x2, however she continued to have 1-2L O2 requirement. Hepatology did not think she was a candidate for liver transplant due to her active alcohol use despite knowing of her liver disease. Her EGD was delayed on 4/23 due to worsening oxygen requirements overnight. She has not had further bleeding episodes, thus EGD then deferred. Azithromycin added for atypical PNA 4/25 due to cough that started before her admission. RIP was negative. Unable to find a pocket for paracentesis. She completed SBP ppx. Patient was agreeable to follow with palliative in [place]. Her support system includes her boyfriend and 4 children. She will likely need hospice in the future. Evaluated by PT/OT that recommended a shower chair on discharge. She did fail a six minute walk test and qualifies for home O2 use, working with CM to find a solution in order to safely be able to discharge to home with O2      Interval History: O2  requirements improved with spot diuresis yesterday. She was able to stand up without needing oxygen this morning. Encouraging incentive spirometer use. She may be able to leave if she passes 6 MWT.    Review of Systems  Objective:     Vital Signs (Most Recent):  Temp: 98.6 °F (37 °C) (04/30/24 0730)  Pulse: 78 (04/30/24 0730)  Resp: 18 (04/30/24 0823)  BP: (!) 113/57 (04/30/24 0823)  SpO2: (!) 94 % (04/30/24 0730) Vital Signs (24h Range):  Temp:  [98.6 °F (37 °C)-99.7 °F (37.6 °C)] 98.6 °F (37 °C)  Pulse:  [76-86] 78  Resp:  [16-20] 18  SpO2:  [93 %-97 %] 94 %  BP: ()/(46-69) 113/57     Weight: 71.7 kg (158 lb)  Body mass index is 25.5 kg/m².    Intake/Output Summary (Last 24 hours) at 4/30/2024 0835  Last data filed at 4/30/2024 0525  Gross per 24 hour   Intake --   Output 1000 ml   Net -1000 ml         Physical Exam  Constitutional:       General: She is not in acute distress.     Appearance: She is ill-appearing.   HENT:      Head: Normocephalic and atraumatic.   Eyes:      General: Scleral icterus present.      Extraocular Movements: Extraocular movements intact.   Cardiovascular:      Rate and Rhythm: Normal rate and regular rhythm.   Pulmonary:      Effort: No respiratory distress.      Breath sounds: No wheezing.      Comments: Crackles on LLL , on 2 L NC  Abdominal:      General: There is distension.      Palpations: Abdomen is soft.      Tenderness: There is no abdominal tenderness. There is no guarding or rebound.      Comments: No asterixis   Skin:     Coloration: Skin is jaundiced.   Neurological:      Mental Status: She is alert and oriented to person, place, and time.             Significant Labs: All pertinent labs within the past 24 hours have been reviewed.    Significant Imaging: I have reviewed all pertinent imaging results/findings within the past 24 hours.    Assessment/Plan:      * Decompensated hepatic cirrhosis  Pt with a hx of alcohol use disorder presenting from OSH for complains of  jaundice. She drinks 48 oz of vodka daily, however has been trying to taper down. Has a history of alcohol withdrawal in the past, but no seizures. As reported per transfer note lab work at OSH was remarkable for  Hb/Hct 7.4/21.0, INR 2.57, Na 120, K 2.5, Mg 0.7. Tbili of 24. . Hep studies negative. CT A/P shows cirrhosis and portal HTN, gastric diffuse bowel wall thickening and jose-pancreatic fat stranding. Physical exam remarkable for sclera icterus, jaundice, asterixis and abdominal distention. US abdomen did not showed a viable pocket for paracentesis.   - US liver showing ascites, Reversal of flow of the main portal, right portal, and left portal veins, which can be seen with liver disease. Hepatomegaly with heterogeneous echotexture.   - CTA negative for any intraabdominal bleeding.  Cirrhotic configuration liver with stigmata of portal venous hypertension and moderate volume abdominopelvic ascites. Relatively diffuse colonic wall thickening with submucosal edema could relate to portal colopathy.  - Hepatology consulted. Not a candidate for liver transplant at this time given active alcohol use    Plan  - Completed CTX for empiric SBP coverage  - PPI for stress ppx  - Octreotide discontinued, monitoring for bleed  - Continue lactulose for goal 3-4 BMs per day   - Unable to find pocket for paracentesis x2  - Addiction psych consulted  - Not a candidate for liver transplant  - Amenable to palliative   - Goal: stable, compensated, see palliative in [place]       Acute hypoxemic respiratory failure  Acute respiratory status due to pleural effusion seen on CXR and URI (coughing before admission).    -RIP negative  -Spot diuresis as needed  -O2 requirements improving  - 6 MWT to assess for home O2        UGIB (upper gastrointestinal bleed)  Pt presenting to [facility] for acute on chronic liver failure. Has a history of alcohol use disorder. Physical exam remarkable for right CVA tenderness, jaundice, sclera icterus.  Reports having melena on her stool. Hgb 6.9 and plts 42.     CTA negative for any intraabdominal bleeding.  Cirrhotic configuration liver with stigmata of portal venous hypertension and moderate volume abdominopelvic ascites. Relatively diffuse colonic wall thickening with submucosal edema could relate to portal colopathy. Unable to perform EGD due to respiratory status.     Plan  - GI consulted, appreciate recs  - Transfuse for Hgb <7, unless otherwise indicated  - Maintain IV access with 2 large bore Ivs  - Intravascular resuscitation/support with IVFs   - Hold all NSAIDs and anticoagulants, unless contraindicated  - Bolus IV pantoprazole 80mg followed by 40mg BID  - Octreotide and CTX now discontinued  - Please correct any coagulopathy with platelets and FFP for goal of platelets >50K and INR <2.0  - EGD cancelled due to respiratory status  - No further reports of bleeding  RESOLVED    Acute cystitis  Pt presenting from OSH for concerns of liver failure. Pt reports dysuria and urinary frequency. UA at OSH remarkable for nitrite positive. Given IV CTX.    Plan  - UA and urine culture  - continue rocephin   RESOLVED      Acute blood loss anemia  Patient's anemia is currently uncontrolled. Has not received any PRBCs to date. Etiology likely d/t chronic disease due to Chronic liver disease  Current CBC reviewed-   Lab Results   Component Value Date    HGB 7.5 (L) 04/27/2024    HCT 22.2 (L) 04/27/2024     - s/p 2 PRBC  - Type and screen   - Transfusing one unit of blood  - Monitor serial CBC and transfuse if patient becomes hemodynamically unstable, symptomatic or H/H drops below 7/21.  - No further reports of bleeding, EGD discontinued  RESOLVED      Alcoholic cirrhosis  Patient with known Cirrhosis with Child's class C. Co-morbidities are present and inclusive of portal hypertension and anemia/pancytopenia.  MELD-Na score calculated; MELD 3.0: 28 at 4/27/2024  7:47 AM  MELD-Na: 25 at 4/27/2024  7:47 AM  Calculated  from:  Serum Creatinine: 0.8 mg/dL (Using min of 1 mg/dL) at 4/27/2024  7:47 AM  Serum Sodium: 136 mmol/L at 4/27/2024  7:47 AM  Total Bilirubin: 15.8 mg/dL at 4/27/2024  2:35 AM  Serum Albumin: 2.1 g/dL at 4/27/2024  2:35 AM  INR(ratio): 1.9 at 4/27/2024  2:35 AM  Age at listing (hypothetical): 38 years  Sex: Female at 4/27/2024  7:47 AM      Continue chronic meds. Etiology likely ETOH. Will avoid any hepatotoxic meds, and monitor CBC/CMP/INR for synthetic function.     For plan please see decompensated cirrhosis    Coagulopathy  Pt with hx of alcohol use disorder and recently diagnosed alcohol liver cirrhosis. Lab work remarkable for elevated INR and Plts 42, PT 25.8 INR 2.5 Fibrinogen 105.  - S/p 2 units prbc, 1 unit plts, and 1 unit cryo on admission    Plan  - Plts goal > 50K  - CBC q6 hrs  - Daily PT/INR    Hyponatremia  Patient has hyponatremia which is uncontrolled,We will aim to correct the sodium by 4-6mEq in 24 hours. We will monitor sodium Daily. The hyponatremia is due to Cirrhosis. We will obtain the following studies: Urine sodium, urine osmolality, serum osmolality. We will treat the hyponatremia with Fluid restriction of:  1.5 liter per day. The patient's sodium results have been reviewed and are listed below.  Recent Labs   Lab 04/27/24  0747        Improving with fluid restriction and low sodium diet.   Midodrine, octreotide, albumin infusion   Nephrology now signed off  Improved    Alcohol use disorder  Pt with hx of alcohol use disorder [place] [facility] [place] acute on chronic liver failure. Reports last drink was on 04/19/24. Also has a hx of alcohol withdrawal with no seizure activity.     Alcohol Withdrawal precautions:  - Vitals q4h while awake  - CIWA monitoring  - Lorazepam PRN if 2 criteria are met: Systolic BP>160, Diastolic BP >110 or Pulse >110  - Start Vitamin supplementation- Thiamine, Folic acid, Vit. B12, and Multivitamin qd  RESOLVED        VTE Risk Mitigation (From  admission, onward)           Ordered     IP VTE LOW RISK PATIENT  Once         04/20/24 1347     Place sequential compression device  Until discontinued         04/20/24 1347                    Discharge Planning   KARSON: 4/30/2024     Code Status: Full Code   Is the patient medically ready for discharge?:     Reason for patient still in hospital (select all that apply): Patient trending condition  Discharge Plan A: Home with family                  Sakina Delong DO  Department of Hospital Medicine   [facility] - Telemetry Stepdown

## 2024-04-30 NOTE — PLAN OF CARE
Problem: Adult Inpatient Plan of Care  Goal: Plan of Care Review  Outcome: Progressing  Goal: Patient-Specific Goal (Individualized)  Outcome: Progressing  Goal: Absence of Hospital-Acquired Illness or Injury  Outcome: Progressing  Goal: Optimal Comfort and Wellbeing  Outcome: Progressing  Goal: Readiness for Transition of Care  Outcome: Progressing     Problem: Fluid and Electrolyte Imbalance (Acute Kidney Injury/Impairment)  Goal: Fluid and Electrolyte Balance  Outcome: Progressing     Problem: Oral Intake Inadequate (Acute Kidney Injury/Impairment)  Goal: Optimal Nutrition Intake  Outcome: Progressing     Problem: Renal Function Impairment (Acute Kidney Injury/Impairment)  Goal: Effective Renal Function  Outcome: Progressing     Problem: Skin Injury Risk Increased  Goal: Skin Health and Integrity  Outcome: Progressing     Pt remained free of falls or injuries during. No signs of acute distress noted during shift.

## 2024-05-01 VITALS
BODY MASS INDEX: 25.39 KG/M2 | DIASTOLIC BLOOD PRESSURE: 58 MMHG | TEMPERATURE: 99 F | OXYGEN SATURATION: 97 % | WEIGHT: 158 LBS | HEART RATE: 98 BPM | SYSTOLIC BLOOD PRESSURE: 109 MMHG | HEIGHT: 66 IN | RESPIRATION RATE: 16 BRPM

## 2024-05-01 LAB
ALBUMIN SERPL BCP-MCNC: 2.2 G/DL (ref 3.5–5.2)
ALP SERPL-CCNC: 116 U/L (ref 55–135)
ALT SERPL W/O P-5'-P-CCNC: 26 U/L (ref 10–44)
ANION GAP SERPL CALC-SCNC: 10 MMOL/L (ref 8–16)
AST SERPL-CCNC: 86 U/L (ref 10–40)
BASOPHILS # BLD AUTO: 0.06 K/UL (ref 0–0.2)
BASOPHILS NFR BLD: 1 % (ref 0–1.9)
BILIRUB SERPL-MCNC: 14.7 MG/DL (ref 0.1–1)
BUN SERPL-MCNC: 5 MG/DL (ref 6–20)
CALCIUM SERPL-MCNC: 7.9 MG/DL (ref 8.7–10.5)
CHLORIDE SERPL-SCNC: 94 MMOL/L (ref 95–110)
CO2 SERPL-SCNC: 32 MMOL/L (ref 23–29)
CREAT SERPL-MCNC: 0.7 MG/DL (ref 0.5–1.4)
DIFFERENTIAL METHOD BLD: ABNORMAL
EOSINOPHIL # BLD AUTO: 0.1 K/UL (ref 0–0.5)
EOSINOPHIL NFR BLD: 2 % (ref 0–8)
ERYTHROCYTE [DISTWIDTH] IN BLOOD BY AUTOMATED COUNT: 23.9 % (ref 11.5–14.5)
EST. GFR  (NO RACE VARIABLE): >60 ML/MIN/1.73 M^2
GLUCOSE SERPL-MCNC: 80 MG/DL (ref 70–110)
HCT VFR BLD AUTO: 23.1 % (ref 37–48.5)
HGB BLD-MCNC: 7.7 G/DL (ref 12–16)
IMM GRANULOCYTES # BLD AUTO: 0.1 K/UL (ref 0–0.04)
IMM GRANULOCYTES NFR BLD AUTO: 1.7 % (ref 0–0.5)
LYMPHOCYTES # BLD AUTO: 1.2 K/UL (ref 1–4.8)
LYMPHOCYTES NFR BLD: 19.8 % (ref 18–48)
MAGNESIUM SERPL-MCNC: 1.1 MG/DL (ref 1.6–2.6)
MAGNESIUM SERPL-MCNC: 3.4 MG/DL (ref 1.6–2.6)
MCH RBC QN AUTO: 37.9 PG (ref 27–31)
MCHC RBC AUTO-ENTMCNC: 33.3 G/DL (ref 32–36)
MCV RBC AUTO: 114 FL (ref 82–98)
MONOCYTES # BLD AUTO: 1 K/UL (ref 0.3–1)
MONOCYTES NFR BLD: 17.3 % (ref 4–15)
NEUTROPHILS # BLD AUTO: 3.4 K/UL (ref 1.8–7.7)
NEUTROPHILS NFR BLD: 58.2 % (ref 38–73)
NRBC BLD-RTO: 0 /100 WBC
PLATELET # BLD AUTO: 43 K/UL (ref 150–450)
PMV BLD AUTO: 11.4 FL (ref 9.2–12.9)
POTASSIUM SERPL-SCNC: 3.5 MMOL/L (ref 3.5–5.1)
PROT SERPL-MCNC: 4.8 G/DL (ref 6–8.4)
RBC # BLD AUTO: 2.03 M/UL (ref 4–5.4)
SODIUM SERPL-SCNC: 136 MMOL/L (ref 136–145)
WBC # BLD AUTO: 5.9 K/UL (ref 3.9–12.7)

## 2024-05-01 PROCEDURE — 97116 GAIT TRAINING THERAPY: CPT | Mod: CQ

## 2024-05-01 PROCEDURE — 83735 ASSAY OF MAGNESIUM: CPT | Performed by: FAMILY MEDICINE

## 2024-05-01 PROCEDURE — 63600175 PHARM REV CODE 636 W HCPCS

## 2024-05-01 PROCEDURE — 36415 COLL VENOUS BLD VENIPUNCTURE: CPT | Performed by: FAMILY MEDICINE

## 2024-05-01 PROCEDURE — S4991 NICOTINE PATCH NONLEGEND: HCPCS

## 2024-05-01 PROCEDURE — 25000003 PHARM REV CODE 250

## 2024-05-01 PROCEDURE — 36415 COLL VENOUS BLD VENIPUNCTURE: CPT

## 2024-05-01 PROCEDURE — 80053 COMPREHEN METABOLIC PANEL: CPT | Performed by: FAMILY MEDICINE

## 2024-05-01 PROCEDURE — 25000003 PHARM REV CODE 250: Performed by: STUDENT IN AN ORGANIZED HEALTH CARE EDUCATION/TRAINING PROGRAM

## 2024-05-01 PROCEDURE — 25000003 PHARM REV CODE 250: Performed by: FAMILY MEDICINE

## 2024-05-01 PROCEDURE — 85025 COMPLETE CBC W/AUTO DIFF WBC: CPT

## 2024-05-01 PROCEDURE — 83735 ASSAY OF MAGNESIUM: CPT | Mod: 91

## 2024-05-01 RX ORDER — LANOLIN ALCOHOL/MO/W.PET/CERES
100 CREAM (GRAM) TOPICAL DAILY
Start: 2024-05-02

## 2024-05-01 RX ORDER — OXYCODONE AND ACETAMINOPHEN 5; 325 MG/1; MG/1
1 TABLET ORAL EVERY 4 HOURS PRN
Qty: 90 TABLET | Refills: 0 | Status: SHIPPED | OUTPATIENT
Start: 2024-05-01 | End: 2024-05-01

## 2024-05-01 RX ORDER — IBUPROFEN 800 MG/1
800 TABLET ORAL 3 TIMES DAILY PRN
Qty: 3 TABLET | Refills: 0 | Status: SHIPPED | OUTPATIENT
Start: 2024-05-01 | End: 2024-05-01 | Stop reason: HOSPADM

## 2024-05-01 RX ORDER — LIDOCAINE 50 MG/G
1 PATCH TOPICAL DAILY
Start: 2024-05-01

## 2024-05-01 RX ORDER — LIDOCAINE 50 MG/G
1 PATCH TOPICAL
Status: DISCONTINUED | OUTPATIENT
Start: 2024-05-01 | End: 2024-05-02 | Stop reason: HOSPADM

## 2024-05-01 RX ORDER — LIDOCAINE 50 MG/G
1 PATCH TOPICAL DAILY
Qty: 9 PATCH | Refills: 0 | Status: SHIPPED | OUTPATIENT
Start: 2024-05-01 | End: 2024-05-01 | Stop reason: HOSPADM

## 2024-05-01 RX ORDER — POTASSIUM CHLORIDE 20 MEQ/1
40 TABLET, EXTENDED RELEASE ORAL ONCE
Status: COMPLETED | OUTPATIENT
Start: 2024-05-01 | End: 2024-05-01

## 2024-05-01 RX ORDER — MAGNESIUM SULFATE HEPTAHYDRATE 40 MG/ML
4 INJECTION, SOLUTION INTRAVENOUS
Status: COMPLETED | OUTPATIENT
Start: 2024-05-01 | End: 2024-05-01

## 2024-05-01 RX ORDER — SPIRONOLACTONE 25 MG/1
25 TABLET ORAL DAILY
Qty: 30 TABLET | Refills: 11 | Status: SHIPPED | OUTPATIENT
Start: 2024-05-02 | End: 2024-05-01 | Stop reason: HOSPADM

## 2024-05-01 RX ORDER — LACTULOSE 10 G/15ML
15 SOLUTION ORAL; RECTAL 3 TIMES DAILY
Qty: 2070 ML | Refills: 2 | Status: SHIPPED | OUTPATIENT
Start: 2024-05-01 | End: 2024-05-31

## 2024-05-01 RX ORDER — HYDROXYZINE PAMOATE 25 MG/1
25 CAPSULE ORAL EVERY 8 HOURS PRN
Qty: 60 CAPSULE | Refills: 0 | Status: SHIPPED | OUTPATIENT
Start: 2024-05-01

## 2024-05-01 RX ORDER — TALC
6 POWDER (GRAM) TOPICAL NIGHTLY PRN
Qty: 90 TABLET | Refills: 0 | Status: SHIPPED | OUTPATIENT
Start: 2024-05-01 | End: 2024-05-01 | Stop reason: HOSPADM

## 2024-05-01 RX ORDER — OXYCODONE AND ACETAMINOPHEN 5; 325 MG/1; MG/1
1 TABLET ORAL EVERY 4 HOURS PRN
Qty: 42 TABLET | Refills: 0 | Status: SHIPPED | OUTPATIENT
Start: 2024-05-01 | End: 2024-05-08

## 2024-05-01 RX ORDER — TORSEMIDE 20 MG/1
20 TABLET ORAL DAILY
Qty: 30 TABLET | Refills: 11 | Status: SHIPPED | OUTPATIENT
Start: 2024-05-01 | End: 2025-05-01

## 2024-05-01 RX ORDER — PANTOPRAZOLE SODIUM 40 MG/1
40 TABLET, DELAYED RELEASE ORAL DAILY
Qty: 90 TABLET | Refills: 3 | Status: SHIPPED | OUTPATIENT
Start: 2024-05-02 | End: 2024-05-01 | Stop reason: HOSPADM

## 2024-05-01 RX ORDER — OMEPRAZOLE 20 MG/1
20 CAPSULE, DELAYED RELEASE ORAL DAILY
Start: 2024-05-01 | End: 2025-05-01

## 2024-05-01 RX ORDER — FOLIC ACID 0.8 MG
800 TABLET ORAL DAILY
Start: 2024-05-01 | End: 2025-05-01

## 2024-05-01 RX ADMIN — NICOTINE 1 PATCH: 14 PATCH, EXTENDED RELEASE TRANSDERMAL at 08:05

## 2024-05-01 RX ADMIN — LACTULOSE 15 G: 20 SOLUTION ORAL at 02:05

## 2024-05-01 RX ADMIN — FUROSEMIDE 80 MG: 10 INJECTION, SOLUTION INTRAVENOUS at 08:05

## 2024-05-01 RX ADMIN — HYDROMORPHONE HYDROCHLORIDE 2 MG: 2 TABLET ORAL at 06:05

## 2024-05-01 RX ADMIN — MIDODRINE HYDROCHLORIDE 10 MG: 5 TABLET ORAL at 08:05

## 2024-05-01 RX ADMIN — FOLIC ACID 1 MG: 1 TABLET ORAL at 08:05

## 2024-05-01 RX ADMIN — THIAMINE HCL TAB 100 MG 100 MG: 100 TAB at 08:05

## 2024-05-01 RX ADMIN — LIDOCAINE 1 PATCH: 50 PATCH CUTANEOUS at 02:05

## 2024-05-01 RX ADMIN — HYDROXYZINE PAMOATE 25 MG: 25 CAPSULE ORAL at 08:05

## 2024-05-01 RX ADMIN — THERA TABS 1 TABLET: TAB at 08:05

## 2024-05-01 RX ADMIN — MAGNESIUM SULFATE HEPTAHYDRATE 4 G: 40 INJECTION, SOLUTION INTRAVENOUS at 08:05

## 2024-05-01 RX ADMIN — POTASSIUM CHLORIDE 40 MEQ: 1500 TABLET, EXTENDED RELEASE ORAL at 08:05

## 2024-05-01 RX ADMIN — HYDROMORPHONE HYDROCHLORIDE 2 MG: 2 TABLET ORAL at 08:05

## 2024-05-01 RX ADMIN — MIDODRINE HYDROCHLORIDE 10 MG: 5 TABLET ORAL at 02:05

## 2024-05-01 RX ADMIN — MAGNESIUM SULFATE HEPTAHYDRATE 4 G: 40 INJECTION, SOLUTION INTRAVENOUS at 10:05

## 2024-05-01 RX ADMIN — LACTULOSE 15 G: 20 SOLUTION ORAL at 08:05

## 2024-05-01 RX ADMIN — SPIRONOLACTONE 25 MG: 25 TABLET ORAL at 08:05

## 2024-05-01 RX ADMIN — PANTOPRAZOLE SODIUM 40 MG: 40 TABLET, DELAYED RELEASE ORAL at 08:05

## 2024-05-01 NOTE — PT/OT/SLP PROGRESS
Occupational Therapy      Patient Name:  Carola Tovar   MRN:  [id number]    OT attempt x1 at 1201pm, pt doing a walk test with nurse  OT attempt x 2 at 1357. Patient not seen today secondary to pt reports she is going home and has 8.5/10 lower back pain. Nurse notified of back pain  . Will follow-up 5/2/2024.    5/1/2024

## 2024-05-01 NOTE — SUBJECTIVE & OBJECTIVE
Interval History:     Review of Systems  Objective:     Vital Signs (Most Recent):  Temp: 98.1 °F (36.7 °C) (05/01/24 0730)  Pulse: 77 (05/01/24 0730)  Resp: 17 (05/01/24 0833)  BP: (!) 94/50 (05/01/24 0730)  SpO2: (!) 92 % (05/01/24 1100) Vital Signs (24h Range):  Temp:  [98.1 °F (36.7 °C)-99.4 °F (37.4 °C)] 98.1 °F (36.7 °C)  Pulse:  [76-79] 77  Resp:  [17-19] 17  SpO2:  [92 %-98 %] 92 %  BP: ()/(43-66) 94/50     Weight: 71.7 kg (158 lb)  Body mass index is 25.5 kg/m².    Intake/Output Summary (Last 24 hours) at 5/1/2024 1150  Last data filed at 4/30/2024 2319  Gross per 24 hour   Intake --   Output 900 ml   Net -900 ml         Physical Exam        Significant Labs: All pertinent labs within the past 24 hours have been reviewed.    Significant Imaging: I have reviewed all pertinent imaging results/findings within the past 24 hours.

## 2024-05-01 NOTE — NURSING
Pt had episode of vaginal bleeding despite stating she just coming of her period a day ago. This was reported bye previous nurse but MD had not made it to bedside. Med 5 on-call provider notified and instructed to monitor pt and report any new occurrences. No new orders noted at this time. No acute distress noted at this time.

## 2024-05-01 NOTE — NURSING
Carola Tovar is a 38 y.o.   female patient, who presents for a 6 minute walk test ordered by  DO Melody.  The diagnosis is dyspnea .  The patient's BMI is 25.5 kg/m2.  Predicted distance (lower limit of normal) is 485.68 meters.      Test Results:    The test was  .  The patient stopped   times for a total of 0 seconds.  The total time walked was 360 seconds.  During walking, the patient reported:   . The patient used    during testing.     05/01/2024---------Distance:   (152ft )     O2 Sat % Supplemental Oxygen Heart Rate Blood Pressure Jordana Scale   Pre-exercise  (Resting) 94        mmHg     During Exercise 89-91        mmHg     Post-exercise  (Recovery)  93        mmHg       Recovery Time:  2 minutes     Performing nurse/tech:  primary nurse      PREVIOUS STUDY:   The patient has not had a previous study.      CLINICAL INTERPRETATION:  Six minute walk distance is   (152ft ) with no dyspnea.  During exercise, there was no significant desaturation while breathing room air.

## 2024-05-01 NOTE — DISCHARGE SUMMARY
Timmy Leon - Telemetry [facility] Medicine  Discharge Summary      Patient Name: Carola Tovar  MRN: [id number]  ETHEL: [id number]  Patient Class: IP- Inpatient  Admission Date: 4/20/2024  Hospital Length of Stay: 11 days  Discharge Date and Time:  05/01/2024 3:45 PM  Attending Physician: Lincoln Reyes MD   Discharging Provider: Sakina Delong DO  Primary Care Provider: Roro Primary Doctor  Hospital Medicine Team: [facility] HOSP MED 5 Sakina Delong DO  Primary Care Team: [facility] MED 5    HPI:   Ms. Tovar is a 39 yo F with alcohol abuse disorder who presented to OSH ED with jaundice. Pt is originally from [place], but have spent the last 1.5 months in [place] visiting a friend. For the past month she has started noticing yellow discoloration in her eyes and skin. Pt reports that she used to drink 48 oz of vodka daily, but for the past week she has taper down to only 16 oz per day. Last drink was yesterday 04/19. She also reports multiple episodes of withdrawal in the past, but no seizure episodes. She also report dysuria and increased urinary frequency. Denies headaches, visual changes, SOB, cough, chest pain, palpitation, nausea, vomiting, abdominal pain, diarrhea, constipation.     At the OSH ED the patient was afebrile tachycardic to 120 with /42. Lab work remarkable for Hb/Hct 7.4/21.0, INR 2.57, Na 120, K 2.5, Mg 0.7. Tbili of 24. U/A nitrite positive. Given IV CTX, Mg and K replacements. Hep studies negative. CT A/P shows cirrhosis and portal HTN, gastric diffuse bowel wall thickening and jose-pancreatic fat stranding. Pt was transferred to [facility] for higher level of care. Pt admitted to hospital medicine for further management of acute on chronic liver failure and hepatology was consulted.     Procedure(s) (LRB):  EGD (ESOPHAGOGASTRODUODENOSCOPY) (N/A)      Hospital Course:   Pt admitted to hospital medicine for decompensated hepatic cirrhosis 2/2 to alcohol use complicated by HE and melena. MELD  was 31 during admission. GI and Hepatology consulted. Hgb 6.9 pt received 2 units of PRBC. Plts low and elevated INR and PT, cryo, plts and vit k given. Low calcium, calcium gluconate given. US liver showing ascites, Reversal of flow of the main portal, right portal, and left portal veins, which can be seen with liver disease. Hepatomegaly with heterogeneous echotexture. CTA negative for any intraabdominal bleeding.  Cirrhotic configuration liver with stigmata of portal venous hypertension and moderate volume abdominopelvic ascites. Relatively diffuse colonic wall thickening with submucosal edema could relate to portal colopathy. Sodium trending down to 118, pt AOX4. Critical care consulted, pt stable to continue under hospital medicine services. Initiated on octreotide, midodrine, and albumin. Hepatology consulted for transplant evaluation, deemed not a candidate. Treated with prn ativan for alcohol withdrawal. Developed worsening PB concerning for HRS, nephrology consulted. Patient did not tolerate albumin challenge which made HRS less likely. Patient increased oxygen requirements night to 30L HFNC from 2L NC. MICU recommended continued diuresis. Oxygen requirements improved with spot diuresis Lasix 40 x2, however she continued to have 1-2L O2 requirement. Hepatology did not think she was a candidate for liver transplant due to her active alcohol use despite knowing of her liver disease. Her EGD was delayed on 4/23 due to worsening oxygen requirements overnight. She has not had further bleeding episodes, thus EGD then deferred. Azithromycin added for atypical PNA 4/25 due to cough that started before her admission. RIP was negative. Unable to find a pocket for paracentesis. She completed SBP ppx. Patient was agreeable to follow with palliative in [place]. Her support system includes her boyfriend and 4 children. She will likely need hospice in the future. Evaluated by PT/OT that recommended a shower chair on  discharge. Her respiratory status was a barrier to discharge as she remained on 1-2 L NC. Although echo on admission was normal, cirrhotic cardiomyopathy suspected as she improved with diuresis. She passed a six minute walk test off oxygen before discharge. She was given a short course of Percocet for back pain, PO Torsemide 20 daily for diuresis, and hydroxyzine for anxiety.     Goals of Care Treatment Preferences:  Code Status: Full Code    Physical Exam  Constitutional:       General: She is not in acute distress.     Appearance: She is ill-appearing.   HENT:      Head: Normocephalic and atraumatic.   Eyes:      General: Scleral icterus present.      Extraocular Movements: Extraocular movements intact.   Cardiovascular:      Rate and Rhythm: Normal rate and regular rhythm.   Pulmonary:      Effort: No respiratory distress.      Breath sounds: No wheezing.      Comments: Crackles on LLL , on 2 L NC  Abdominal:      General: There is distension.      Palpations: Abdomen is soft.      Tenderness: There is no abdominal tenderness. There is no guarding or rebound.      Comments: No asterixis   Skin:     Coloration: Skin is jaundiced.   Neurological:      Mental Status: She is alert and oriented to person, place, and time.     Consults:   Consults (From admission, onward)          Status Ordering Provider     Inpatient consult to Critical Care Medicine  Once        Provider:  (Not yet assigned)    Completed NAOMY DOS SANTOS     Inpatient consult to Nephrology  Once        Provider:  (Not yet assigned)    Completed ANITA MARTINEZ     Inpatient consult to Psychiatry  Once        Provider:  (Not yet assigned)    Completed HOLLY PERRY     Inpatient consult to Critical Care Medicine  Once        Provider:  (Not yet assigned)    Completed HOLLY PERRY     Inpatient consult to Gastroenterology  Once        Provider:  (Not yet assigned)    Completed ANGELA RINCON     Inpatient consult to  Hepatology  Once        Provider:  (Not yet assigned)    Urszula RINCON [place]            No new Assessment & Plan notes have been filed under this hospital service since the last note was generated.  Service: Hospital Medicine    Final Active Diagnoses:    Diagnosis Date Noted POA    PRINCIPAL PROBLEM:  Decompensated hepatic cirrhosis [K72.90, K74.60] 04/20/2024 Yes    Encounter for pre-transplant evaluation for liver transplant [Z01.818] 04/22/2024 Not Applicable    PB (acute kidney injury) [N17.9] 04/22/2024 No    Acute hypoxemic respiratory failure [J96.01] 04/22/2024 Yes    Alcohol use disorder [F10.90] 04/20/2024 Yes    Hyponatremia [E87.1] 04/20/2024 Yes    Coagulopathy [D68.9] 04/20/2024 Yes    Alcoholic cirrhosis [K70.30] 04/20/2024 Yes    Acute blood loss anemia [D62] 04/20/2024 Yes    Acute cystitis [N30.00] 04/20/2024 Yes    UGIB (upper gastrointestinal bleed) [K92.2] 04/20/2024 Yes      Problems Resolved During this Admission:    Diagnosis Date Noted Date Resolved POA    Alcohol withdrawal, uncomplicated [F10.930] 04/22/2024 04/27/2024 Yes    Acute liver failure without hepatic coma [K72.00] 04/20/2024 04/20/2024 Yes    Hypomagnesemia [E83.42] 04/20/2024 04/22/2024 Yes       Discharged Condition: fair    Disposition: Home or Self Care    Follow Up:   Follow-up Information       [facility] Follow up.    Why: Pt is allowed to go in as a walk in pt to his hospital f/u visit.  Contact information:  [address] [place] [address]  [place] [address] [phone]                         Patient Instructions:   No discharge procedures on file.    Significant Diagnostic Studies: N/A    Pending Diagnostic Studies:       Procedure Component Value Units Date/Time    IR US Abdomen Limited [[id number]] Resulted: 04/25/24 1524    Order Status: Sent Lab Status: In process Updated: 04/25/24 1526           Medications:  Reconciled Home Medications:      Medication List        START taking these medications       hydrOXYzine pamoate 25 MG Cap  Commonly known as: VISTARIL  Take 1 capsule (25 mg total) by mouth every 8 (eight) hours as needed (sleep).     midodrine 10 MG tablet  Commonly known as: PROAMATINE  Take 1 tablet (10 mg total) by mouth 3 (three) times daily.     oxyCODONE-acetaminophen 5-325 mg per tablet  Commonly known as: PERCOCET  Take 1 tablet by mouth every 4 (four) hours as needed for Pain.     torsemide 20 MG Tab  Commonly known as: DEMADEX  Take 1 tablet (20 mg total) by mouth once daily.              Indwelling Lines/Drains at time of discharge:   Lines/Drains/Airways       None                   Time spent on the discharge of patient: 60 minutes         Sakina Delong DO  Department of Hospital Medicine  [facility] - Telemetry Stepdown

## 2024-05-01 NOTE — PLAN OF CARE
Timmy Loen - Telemetry Stepdown  Discharge Final Note    Primary Care Provider: No, Primary Doctor    Expected Discharge Date: 5/1/2024    Final Discharge Note (most recent)       Final Note - 05/01/24 1614          Final Note    Assessment Type Final Discharge Note     Anticipated Discharge Disposition Home or Self Care     What phone number can be called within the next 1-3 days to see how you are doing after discharge? [phone]        Post-Acute Status    Post-Acute Authorization Other     Other Status No Post-Acute Service Needs                     Important Message from Medicare             Contact Info       [facility]    [address] [place] [address]  [place] [address] [phone]       Next Steps: Follow up    Instructions: Pt is allowed to go in as a walk in pt to his hospital f/u visit.            CM spoke with patient and stressed the importance of following up with [facility]. She expressed understanding and stated that she has been there in the past. She informed this CM that her transportation is on the way and she will stop at the [facility] downstairs and  her ordered medications.    Ania White RN  Ext [phone]

## 2024-05-01 NOTE — PT/OT/SLP PROGRESS
Physical Therapy Treatment    Patient Name:  Carola Tovar   MRN:  [id number]    Recommendations:     Discharge Recommendations: Low Intensity Therapy  Discharge Equipment Recommendations: none  Barriers to discharge: None    Assessment:     Carola Tovar is a 38 y.o. female admitted with a medical diagnosis of Decompensated hepatic cirrhosis.  She presents with the following impairments/functional limitations: weakness, impaired endurance, gait instability . Patient showed improved walking range and no loss of balance. Patient ambulates with decreased but consistent salome.    Rehab Prognosis: Good; patient would benefit from acute skilled PT services to address these deficits and reach maximum level of function.    Recent Surgery: Procedure(s) (LRB):  EGD (ESOPHAGOGASTRODUODENOSCOPY) (N/A) 8 Days Post-Op    Plan:     During this hospitalization, patient to be seen 3 x/week to address the identified rehab impairments via gait training, therapeutic activities, therapeutic exercises, neuromuscular re-education and progress toward the following goals:    Plan of Care Expires:  05/27/24    Subjective     Chief Complaint: only slept 2 hours last night.  Patient/Family Comments/goals: to go home later.  Pain/Comfort:  Pain Rating 1: 0/10  Pain Rating Post-Intervention 1: 0/10      Objective:     Communicated with NSG prior to session.  Patient found HOB elevated with  (No active lines) upon PT entry to room.     General Precautions: Standard, fall  Orthopedic Precautions: N/A  Braces: N/A  Respiratory Status: Room air     Functional Mobility:  Bed Mobility:     Rolling Left:  modified independence  Scooting: modified independence  Supine to Sit: modified independence  Sit to Supine: independence  Transfers:     Sit to Stand:  independence with no AD  Bed to Chair: independence with  no AD  using  Stand Pivot  Gait: ~600 ft with no AD with supervision only.      AM-PAC 6 CLICK MOBILITY  Turning over in bed (including adjusting  bedclothes, sheets and blankets)?: 4  Sitting down on and standing up from a chair with arms (e.g., wheelchair, bedside commode, etc.): 4  Moving from lying on back to sitting on the side of the bed?: 4  Moving to and from a bed to a chair (including a wheelchair)?: 4  Need to walk in hospital room?: 4  Climbing 3-5 steps with a railing?: 3  Basic Mobility Total Score: 23       Treatment & Education:  Donned a second gown. Educated patient to stay out of bed when discharged home.    Patient left HOB elevated with call button in reach..    GOALS:   Multidisciplinary Problems       Physical Therapy Goals          Problem: Physical Therapy    Goal Priority Disciplines Outcome Goal Variances Interventions   Physical Therapy Goal     PT, PT/OT Progressing     Description: Goals to be met by: 24     Patient will increase functional independence with mobility by performin. Supine to sit with Avery  2. Sit to stand transfer with Avery  3. Bed to chair transfer with Avery using No Assistive Device  4. Gait  x 150 feet with Supervision using No Assistive Device.   5. Ascend/descend 3 stair with no Handrails Contact Guard Assistance using No Assistive Device.                          Time Tracking:     PT Received On: 24  PT Start Time: 1414     PT Stop Time: 1423  PT Total Time (min): 9 min     Billable Minutes: Gait Training 9    Treatment Type: Treatment  PT/PTA: PTA     Number of PTA visits since last PT visit: 2024

## 2024-05-01 NOTE — NURSING
Pt B/P 88/54 MD on-call Notified and instructed this nurse to take a manual B/P which resulted in 102/52. Results reported to MD. No new orders noted at this time. No acute distress noted at this time.

## 2024-05-02 NOTE — NURSING
Patient remains free from falls and injuries through out shift. Patient telemetry removed. IV removed with catheter tip in place. Prescriptions will be picked up from pharmacy when ride arrives with  form of payment. Patient VSS. Discharge instructions, medication list reviewed, and patient verbalizes understanding. Patient waiting from transportation home to arrive.

## 2024-10-02 ENCOUNTER — HOSPITAL ENCOUNTER (OUTPATIENT)
Dept: HOSPITAL 47 - RADMAMWWP | Age: 39
Discharge: HOME | End: 2024-10-02
Attending: FAMILY MEDICINE
Payer: COMMERCIAL

## 2024-10-02 PROCEDURE — 77063 BREAST TOMOSYNTHESIS BI: CPT

## 2024-10-02 PROCEDURE — 77067 SCR MAMMO BI INCL CAD: CPT

## 2024-10-03 NOTE — MM
Reason for Exam: Screening  (asymptomatic). 

Last mammogram was performed 1 year(s) and 5 month(s) ago. 





Patient History: 

Menarche at age 10. First Full-Term Pregnancy at age 23. Premenopausal. Hormonal Contraceptives,

from age 27 until age 33.

Last menstrual period: 09/07/2024





Risk Values: 

Radha 5 year model risk: 0.7%.

NCI Lifetime model risk: 11.0%.





Prior Study Comparison: 

7/26/2021 Bilateral Screening Mammogram, [facility]. 5/18/2023 Bilateral MG 3D screening mammo w/cad, [facility]. 





Tissue Density: 

The breasts are extremely dense, which lowers the sensitivity of mammography.





Findings: 

Analyzed By CAD. 

Right breast: There is no suspicious group of microcalcifications or new suspicious mass.



Left breast: There is no suspicious group of microcalcifications or new suspicious mass. 





Overall Assessment: Negative, BI-RAD 1





Management: 

Screening Mammogram of both breasts in 1 year.

[facility] will attempt to contact patient to return for supplemental views and

ultrasound if indicated.



Patient should continue monthly self-breast exams.  A clinical breast exam by your physician is

recommended on an annual basis.

This exam should not preclude additional follow-up of suspicious palpable abnormalities.



Note on Radha scores and lifetime risk:

1. A Radha score greater than 3% is considered moderate risk. If this is the case, consider

specialist referral to assess eligibility for a risk reducing agent.

2. If overall lifetime risk for the development of breast cancer is 20% or higher, the patient may

qualify for future screening with alternating mammogram and breast MRI.



[facility], Workstation: [id number], 10/3/2024 1:57 PM.



Electronically signed and approved by: Emeka Grant DO